# Patient Record
Sex: MALE | Race: WHITE
[De-identification: names, ages, dates, MRNs, and addresses within clinical notes are randomized per-mention and may not be internally consistent; named-entity substitution may affect disease eponyms.]

---

## 2017-01-13 ENCOUNTER — HOSPITAL ENCOUNTER (OUTPATIENT)
Dept: HOSPITAL 80 - FED | Age: 77
Setting detail: OBSERVATION
LOS: 1 days | Discharge: HOME | End: 2017-01-14
Attending: INTERNAL MEDICINE | Admitting: INTERNAL MEDICINE
Payer: COMMERCIAL

## 2017-01-13 DIAGNOSIS — I13.0: ICD-10-CM

## 2017-01-13 DIAGNOSIS — M10.9: ICD-10-CM

## 2017-01-13 DIAGNOSIS — N18.9: ICD-10-CM

## 2017-01-13 DIAGNOSIS — Z79.01: ICD-10-CM

## 2017-01-13 DIAGNOSIS — Z87.442: ICD-10-CM

## 2017-01-13 DIAGNOSIS — Z96.651: ICD-10-CM

## 2017-01-13 DIAGNOSIS — Z91.19: ICD-10-CM

## 2017-01-13 DIAGNOSIS — I50.23: Primary | ICD-10-CM

## 2017-01-13 DIAGNOSIS — I42.9: ICD-10-CM

## 2017-01-13 DIAGNOSIS — Z95.5: ICD-10-CM

## 2017-01-13 DIAGNOSIS — I48.2: ICD-10-CM

## 2017-01-13 DIAGNOSIS — I25.10: ICD-10-CM

## 2017-01-13 DIAGNOSIS — G31.84: ICD-10-CM

## 2017-01-13 DIAGNOSIS — R06.02: ICD-10-CM

## 2017-01-13 DIAGNOSIS — I50.22: ICD-10-CM

## 2017-01-13 DIAGNOSIS — R07.9: ICD-10-CM

## 2017-01-13 LAB
% IMMATURE GRANULYOCYTES: 0.2 % (ref 0–1.1)
ABSOLUTE IMMATURE GRANULOCYTES: 0.02 10^3/UL (ref 0–0.1)
ABSOLUTE NRBC COUNT: 0 10^3/UL (ref 0–0.01)
ADD DIFF?: NO
ADD MORPH?: NO
ADD SCAN?: NO
ANION GAP SERPL CALC-SCNC: 17 MEQ/L (ref 8–16)
APTT BLD: 35.1 SEC (ref 23–38)
ATYPICAL LYMPHOCYTE FLAG: 0 (ref 0–99)
CALCIUM SERPL-MCNC: 9.3 MG/DL (ref 8.5–10.4)
CHLORIDE SERPL-SCNC: 109 MEQ/L (ref 97–110)
CO2 SERPL-SCNC: 16 MEQ/L (ref 22–31)
CREAT SERPL-MCNC: 1.2 MG/DL (ref 0.7–1.3)
DIGOXIN SERPL-MCNC: < 0.4 NG/ML (ref 0.8–2)
ERYTHROCYTE [DISTWIDTH] IN BLOOD BY AUTOMATED COUNT: 13.9 % (ref 11.5–15.2)
FRAGMENT RBC FLAG: 0 (ref 0–99)
GFR SERPL CREATININE-BSD FRML MDRD: 59 ML/MIN/{1.73_M2}
GLUCOSE SERPL-MCNC: 170 MG/DL (ref 70–100)
HCT VFR BLD CALC: 46.8 % (ref 40–51)
HGB BLD-MCNC: 16.5 G/DL (ref 13.7–17.5)
INR PPP: 1.3 (ref 0.83–1.16)
LEFT SHIFT FLG: 0 (ref 0–99)
LIPEMIA HEMOLYSIS FLAG: 90 (ref 0–99)
MCH RBC BLDCO QN: 32.1 PG (ref 27.9–34.1)
MCHC RBC AUTO-ENTMCNC: 35.3 G/DL (ref 32.4–36.7)
MCV RBC AUTO: 91.1 FL (ref 81.5–99.8)
NRBC-AUTO%: 0 % (ref 0–0.2)
PLATELET # BLD: 207 10^3/UL (ref 150–400)
PLATELET CLUMPS FLAG: 10 (ref 0–99)
PMV BLD AUTO: 9.2 FL (ref 8.7–11.7)
POTASSIUM SERPL-SCNC: 4.2 MEQ/L (ref 3.5–5.2)
PROTHROMBIN TIME: 16.2 SEC (ref 12–15)
RBC # BLD AUTO: 5.14 10^6/UL (ref 4.4–6.38)
SODIUM SERPL-SCNC: 142 MEQ/L (ref 134–144)
TROPONIN I SERPL-MCNC: 0.08 NG/ML (ref 0–0.03)

## 2017-01-13 PROCEDURE — 93005 ELECTROCARDIOGRAM TRACING: CPT

## 2017-01-13 PROCEDURE — 99285 EMERGENCY DEPT VISIT HI MDM: CPT

## 2017-01-13 PROCEDURE — 93306 TTE W/DOPPLER COMPLETE: CPT

## 2017-01-13 PROCEDURE — 96374 THER/PROPH/DIAG INJ IV PUSH: CPT

## 2017-01-13 PROCEDURE — G0378 HOSPITAL OBSERVATION PER HR: HCPCS

## 2017-01-13 PROCEDURE — 71020: CPT

## 2017-01-13 RX ADMIN — ENOXAPARIN SODIUM SCH MG: 100 INJECTION SUBCUTANEOUS at 10:08

## 2017-01-13 RX ADMIN — SPIRONOLACTONE SCH MG: 25 TABLET, FILM COATED ORAL at 14:35

## 2017-01-13 RX ADMIN — CLOPIDOGREL BISULFATE SCH MG: 75 TABLET, FILM COATED ORAL at 14:34

## 2017-01-13 RX ADMIN — ISOSORBIDE MONONITRATE SCH MG: 30 TABLET, EXTENDED RELEASE ORAL at 14:34

## 2017-01-13 NOTE — EDPHY
H & P


Stated Complaint: cp ongoing x 4 hrs pta


Time Seen by Provider: 01/13/17 04:02


HPI/ROS: 


HPI


The patient presents with chest pain which began at 2:00 a.m. which awoke him 

from sleep tonight.  He is brought in by ambulance after receiving an aspirin 

in the field.  He describes it as a squeezing pain located in his epigastric 

region.  It lasted for about 2 hours until he arrived in the emergency 

department.  The pain was associated with a feeling of clamminess.  He has 

had several episodes of similar pain.  He has not had any associated shortness 

of breath, nausea, vomiting, diaphoresis.





He is followed by Dr. Massey and he says his last appointment was 1 week ago.  

He has been compliant with all of his medications and is keeping a journal.  He 

was admitted to the hospital in November for chest pain. He had a normal 

evaluation, please see Dr. gu takes discharge summary for further information.





REVIEW OF SYSTEMS


Constitutional:  No fever, no chills.


Eyes:  No discharge.


ENT:  No sore throat.


Cardiovascular:  +chest pain, no palpitations.


Respiratory:  No cough, no shortness of breath.


Gastrointestinal:  No abdominal pain, no vomiting.


Genitourinary:  No hematuria.


Musculoskeletal:  No back pain.


Skin:  No rashes.


Neurological:  No headache.





PMHx:  CAD with stent in place in the LAD, CHF





Soc Hx:  Lives alone











PHYSICAL


General Appearance: Alert, no distress


Eyes: Pupils equal and round no pallor or injection


ENT, Mouth: Mucous membranes moist


Respiratory: There are no retractions, lungs are clear to auscultation


Cardiovascular:  Tachycardic and irregular


Gastrointestinal:  Abdomen is soft and non-tender, no masses, bowel sounds 

normal 


Neurological:  A&O, moves all extremities


Skin:  Warm and dry, no rashes


Musculoskeletal: Neck is supple non tender 


Extremities:  symmetrical, full range of motion 


Psychiatric:  Patient is oriented X 3, there is no agitation 





Source: Patient, EMS


Exam Limitations: No limitations





- Personal History


Tetanus Vaccine Date: 3/2010





- Medical/Surgical History


Hx Asthma: No


Hx Chronic Respiratory Disease: No


Hx Diabetes: No


Hx Cardiac Disease: Yes


Hx Renal Disease: Yes


Hx Cirrhosis: No


Hx Alcoholism: No


Hx HIV/AIDS: No


Hx Splenectomy or Spleen Trauma: No


Other PMH: 1. Chronic systolic heart failure the chest Verito fraction of 25%.  

2. Coronary artery disease status post stenting on 11/16/2016.  3. Chronic 

atrial fibrillation.  4. Hypertension.  5. Chronic renal insufficiency.  6. 

Nephrolithiasis with ureteral stent placement November of 2016.  7. Gout.  8. 

Right total knee arthroplasty.  9. Appendectomy





- Social History


Smoking Status: Never smoked


Constitutional: 


 Initial Vital Signs











Temperature (C)  36.7 C   01/13/17 04:07


 


Heart Rate  117 H  01/13/17 04:07


 


Respiratory Rate  26 H  01/13/17 04:07


 


Blood Pressure  144/90 H  01/13/17 04:07


 


O2 Sat (%)  94   01/13/17 04:07








 











O2 Delivery Mode               Nasal Cannula


 


O2 (L/minute)                  2














Allergies/Adverse Reactions: 


 





atorvastatin calcium [From Lipitor] Allergy (Severe, Verified 01/13/17 04:13)


 Other-Enter Comments








Home Medications: 














 Medication  Instructions  Recorded


 


Allopurinol [Allopurinol 300  mg PO DAILY 11/23/16





(RX)]  


 


Aspirin EC [Aspirin EC 81 mg (*)] 81 mg PO DAILY 11/23/16


 


Clopidogrel Bisulfate [Plavix (*)] 75 mg PO DAILY 11/23/16


 


Digoxin [Lanoxin 125 mcg (RX)] 125 mcg PO HS 11/23/16


 


Furosemide [Lasix 40 MG (*)] 40 mg PO DAILY PRN 11/23/16


 


Metoprolol Succinate Xr [Toprol Xl 50 mg PO DAILY 11/23/16





50 mg (*)]  


 


Spironolactone [Aldactone 25 MG 12.5 mg PO DAILY 11/23/16





(*)]  


 


Acetaminophen [Tylenol 325mg (*)] 650 mg PO Q4HRS PRN #0 tab 11/25/16


 


Isosorbide Mononitrate [Imdur 30 30 mg PO DAILY #30 tab.sr 11/25/16





mg (*)]  


 


Warfarin Sodium [Coumadin 5MG (*)] 5 mg PO MOWE@16 #0 tab 11/25/16


 


Warfarin Sodium [Coumadin 7.5MG 7.5 mg PO SUTUTHFRSA@16 #0 tab 11/25/16





(*)]  














Medical Decision Making





- Diagnostics


EKG Interpretation: 


EKG EKG:  Complete interpretation has been separately recorded in the 

[employer] archive.  Summary impression: 


1. Demonstrates atrial fibrillation with RVR, 





EKG 2. Shows atrial fibrillation with rate in the 100s, no ST segment elevation


Imaging: 


Chest x-ray two views shows cardiomegaly, no infiltrate, interpreted by me, 

radiology interpretation is pending.


ED Course/Re-evaluation: 


5:00 a.m.-


The patient has had no recurrence of his chest pain.  Labs have been checked 

and do demonstrate elevated troponin, similar to priors.  His BNP is also 

elevated, higher than his usual.  On reassessment he says he feels well.  Plan 

to repeat troponin and will discuss the case with Cardiology.  The patient has 

had frequent admissions for chest pain.





7:00 a.m.-


The patient continues to be chest pain-free repeat troponin is slightly more 

elevated than the 1st.  He was given a dose of diltiazem with improvement in 

his heart rate from the 160s to the low 100s.


I discussed the case with the cardiologist Dr. Burr who recommends 

admission and is restarting the patient on his medications that he does not 

appear to be taking at home.  I have discussed the case with the hospitalist 

Dr. Cooley and we will admit the patient to the PCU.


Differential Diagnosis: 


This is a 76-year-old male with past medical history of CAD, ischemic 

cardiomyopathy, chronic kidney disease, possible cognitive impairment who 

presents brought in by ambulance for chest pain which began at 2:00 a.m. this 

morning while at rest and is now resolved.





On exam, he is in atrial fibrillation with RVR.  The remainder of his exam is 

unremarkable.





Differential diagnosis includes ACS, decompensated CHF, GERD.





- Data Points


Laboratory Results: 


 Laboratory Results





 01/13/17 04:03 





 01/13/17 04:03 





 











  01/13/17 01/13/17





  06:01 04:03


 


WBC    10.70 H 10^3/uL





    (3.80-9.50) 


 


RBC    5.14 10^6/uL





    (4.40-6.38) 


 


Hgb    16.5 g/dL





    (13.7-17.5) 


 


Hct    46.8 %





    (40.0-51.0) 


 


MCV    91.1 fL





    (81.5-99.8) 


 


MCH    32.1 pg





    (27.9-34.1) 


 


MCHC    35.3 g/dL





    (32.4-36.7) 


 


RDW    13.9 %





    (11.5-15.2) 


 


Plt Count    207 10^3/uL





    (150-400) 


 


MPV    9.2 fL





    (8.7-11.7) 


 


Neut % (Auto)    78.0 H %





    (39.3-74.2) 


 


Lymph % (Auto)    15.3 %





    (15.0-45.0) 


 


Mono % (Auto)    5.3 %





    (4.5-13.0) 


 


Eos % (Auto)    0.5 L %





    (0.6-7.6) 


 


Baso % (Auto)    0.7 %





    (0.3-1.7) 


 


Nucleat RBC Rel Count    0.0 %





    (0.0-0.2) 


 


Absolute Neuts (auto)    8.35 H 10^3/uL





    (1.70-6.50) 


 


Absolute Lymphs (auto)    1.64 10^3/uL





    (1.00-3.00) 


 


Absolute Monos (auto)    0.57 10^3/uL





    (0.30-0.80) 


 


Absolute Eos (auto)    0.05 10^3/uL





    (0.03-0.40) 


 


Absolute Basos (auto)    0.07 10^3/uL





    (0.02-0.10) 


 


Absolute Nucleated RBC    0.00 10^3/uL





    (0-0.01) 


 


Immature Gran %    0.2 %





    (0.0-1.1) 


 


Immature Gran #    0.02 10^3/uL





    (0.00-0.10) 


 


PT    16.2 H SEC





    (12.0-15.0) 


 


INR    1.30 H 





    (0.83-1.16) 


 


APTT    35.1 SEC





    (23.0-38.0) 


 


Sodium    142 mEq/L





    (134-144) 


 


Potassium    4.2 mEq/L





    (3.5-5.2) 


 


Chloride    109 mEq/L





    () 


 


Carbon Dioxide    16 L mEq/l





    (22-31) 


 


Anion Gap    17 mEq/L





    (8-16) 


 


BUN    18 mg/dL





    (7-23) 


 


Creatinine    1.2 mg/dL





    (0.7-1.3) 


 


Estimated GFR    59 





   


 


Glucose    170 H mg/dL





    () 


 


Calcium    9.3 mg/dL





    (8.5-10.4) 


 


Troponin I  0.079 H ng/mL  0.076 H ng/mL





   (0-0.034)   (0-0.034) 


 


NT-Pro-B Natriuret Pep    94016 H pg/mL





    (0-450) 


 


Digoxin    < 0.4 L ng/mL





    (0.8-2.0) 











Medications Given: 


 








Discontinued Medications





Diltiazem HCl (Cardizem 25 Mg/5 Ml Vial)  20 mg IVP EDNOW ONE


   Stop: 01/13/17 04:09


   Last Admin: 01/13/17 04:24 Dose:  20 mg








Departure





- Departure


Disposition: [facility] Inpatient Acute


Clinical Impression: 


 Acute exacerbation of CHF (congestive heart failure), Chest pain, Atrial 

fibrillation with rapid ventricular response


Condition: Fair

## 2017-01-13 NOTE — ECHO
[id number]

[id number]



+---------------------------------------------------+      [address]

:                                                   :       [place] [address]

:                                                   :           [phone]

+---------------------------------------------------+       Fax [phone]



                       Adult Echocardiographic Report



+----------------------------------------------------------------------------

-----+

:Name: HAL GARCIA CStudy Date: 2017 08:09 AM                       

     :

:MRN: [id number]       Hospital Admission Number: [id number] Locatio

n: ER:

:: 1940       Gender: Male                           Height: 852 in 

     :

:Age: 76 yrs           Race: WH,UN,U                          Weight: 189 lb 

     :

:Reason For Study: Eval LV Fx                                                

     :

:                                                             BSA: 12.5 meter

s2   :

:History: Known History of CHF, New onset A-Fib, SOB                         

     :

+----------------------------------------------------------------------------

-----+

MMode/2D Measurements & Calculations

IVSd: 1.4 cm       LVIDd: 8.5 cm    FS: 10.6 %           MV Diam: 4.7 cm

LVPWd: 1.3 cm      LVIDs: 7.6 cm    EDV(Teich): 391.2 ml

                                    ESV(Teich): 304.5 ml

                                    EF(Teich): 22.2 %

        _____________________________________________________________



Ao root diam:      LVOT diam: 2.1 cmLVLd ap4: 8.4 cm     SV(MOD-sp4):

3.9 cm             LVOT area:       EDV(MOD-sp4):        69.0 ml

ACS: 2.1 cm        3.5 cm2          206.0 ml

LA dimension:                       LVLs ap4: 8.3 cm

5.2 cm                              ESV(MOD-sp4):

                                    137.0 ml

                                    EF(MOD-sp4): 33.5 %



Normal Measurement Values:

+----------------------------------------------------------------------------

----------------------------------+

:LVIDd (3.5-5.7cm)    IVSd (0.6-1.1cm)     LVPWd (0.6-1.1cm)     Aortic Root 

(2.0-3.7cm)Left Atrium (1.5-4.0cm):

:LV Vol(d) (76-115ml) LV Vol(s) (29-48ml)  Ejec Fraction (50-65%)PV Vinny (0.6-

1.2m/s)    TV Vinny (0.4-1.0m/s)    :

:MV E Vinny (0.8-1.0m/s)MV A Vinny (0.3-1.0m/s)LVOT Vinny (0.7-1.2m/s) Asc Ao Vinny (

0.9-1.8m/s)                       :

+----------------------------------------------------------------------------

----------------------------------+

Doppler Measurements & Calculations

MV V2 max:          Ao mean PG:        AI max vinny:         LV V1 mean P.2 cm/sec        7.8 mmHg           439.4 cm/sec        2.0 mmHg

MV max P.3 mmHg Ao V2 mean:        AI max PG:          LV V1 mean:

MV V2 mean:         130.4 cm/sec       77.2 mmHg           62.6 cm/sec

71.5 cm/sec         Ao V2 VTI: 30.8 cm AI dec slope:       LV V1 VTI:

MV mean P.3 mmHgAVA(I,D): 2.0 cm2  208.6 cm/sec2       17.4 cm

MV V2 VTI: 20.1 cm                     AI P1/2t:

MV area (1 diam):                      616.9 msec

17.4 cm2



MVA(VTI): 3.0 cm2

MV Flow area(1diam):

17.4 cm2

        _____________________________________________________________



MR max vinny:         MR(RF 1 diam):     SV(MV 1 diam):      PA V2 max:

482.8 cm/sec        10.2 %             349.7 ml            100.4 cm/sec

MR max P.5 mmHg                   SI(MV 1 diam):      PA max P.0 ml/m2          4.0 mmHg

                                       SV(LVOT): 60.3 ml



        _____________________________________________________________

TR max vinny:         RF(MV,Ao)(1 diam):

308.1 cm/sec        -0.06

TR max P.0 mmHgRF(MV,LVOT)

RAP systole:        (1diam): 0.83

5.0 mmHg

RVSP(TR): 43.0 mmHg



Left Ventricle

The left ventricle is severely dilated. There is mild to moderate concentric

left ventricular hypertrophy. Ejection Fraction = 15-20%. The rhythm is

atrial fibrillation. There is severe global hypokinesis of the left

ventricle.







Right Ventricle

The right ventricle is normal size. The right ventricular systolic function

is mildly reduced.



Atria

The left atrium is moderate to severely dilated. The Left Atrial Volume is

48 ml/m2. The right atrium is mildly dilated.



Mitral Valve

There is mild to moderate mitral annular calcification. There is no evidence

of mitral valve prolapse. There is no mitral valve stenosis. There is

moderate to severe mitral regurgitation.



Tricuspid Valve

There is mild tricuspid regurgitation.



Aortic Valve

The aortic valve is trileaflet. The aortic valve opens well. There is no

aortic stenosis. Moderate aortic regurgitation.



Pulmonic Valve

The pulmonic valve is normal in structure and function. There is no pulmonic

valvular regurgitation.





Great Vessels

The aortic root is normal size.



Pericardium/Pleural

There is no pericardial effusion.



Conclusion

A complete two-dimensional transthoracic echocardiogram was performed (2D,

M-mode, Doppler and color flow Doppler).

Compared to previous the rhythm is atrial fibrillation and the EF appears to

have decreased to 20% range from 30%.

The left ventricle is severely dilated.

There is mild to moderate concentric left ventricular hypertrophy.

Ejection Fraction = 15-20%.

The rhythm is atrial fibrillation.

The right ventricular systolic function is mildly reduced.

The left atrium is moderate to severely dilated.

There is mild to moderate mitral annular calcification.

There is moderate to severe mitral regurgitation.

There is mild tricuspid regurgitation.

The aortic valve is trileaflet.

The aortic valve opens well.

Moderate aortic regurgitation.

The aortic root is normal size.

Compared to previous the rhythm is atrial fibrillation and the EF appears to

have decreased to 20% range from 30%

There is severe global hypokinesis of the left ventricle.





_____________________________________________________________________________





Final Reading Physician:

                        Rehana Lorenzo signed on 2017 10:13 AM

Ordering Physician: Shiloh Cooley

Performed By: Chase Castro, CS

## 2017-01-13 NOTE — PDCARPN
Cardiology Progress Note


Assessment/Plan: 


76-year-old male well-known to our practice. Has a long-standing history of 

systolic CHF related to cardiomyopathy with severely reduced left ventricular 

systolic function out of proportion to his coronary artery disease. Has had 

multiple catheterizations over the years demonstrating mild to moderate 

irregularities. Ultimately, underwent PCI of the mid-LAD 2 months ago. LVEF is 

chronically in the range of 20%. He also has chronic atrial fibrillation. Has a 

history of issues with medical noncompliance in part due to cognitive issues. 

This has improved recently with a systematic plan including a medication 

journal. Presented to the emergency room early this morning after he woke at 

approximately 2 AM with significant shortness of breath and chest discomfort. 

Two troponin levels have been minimally elevated with a "flat curve" consistent 

with myocardial oxygen supply/demand mismatch. Received intravenous Lasix in 

the emergency room. He is not experiencing chest discomfort at the time of my 

evaluation. Does not demonstrate evidence of significant volume overload. His 

chest x-ray is clear. Perhaps trace rales at the lung bases. No peripheral 

edema. Currently borderline with respect to rate control of his atrial 

fibrillation. Interestingly, has not been on a standing dose of a loop diuretic 

for the past few weeks since he had not manifested significant volume 

retention. He will be going to PCU. Would consider one or 2 additional doses of 

intravenous Lasix since his BNP is significantly higher than usual.





Will be seen by Dr. Massey over the weekend.





Agree with additional metoprolol via IV ass needed to help with rate control.





Continue the other components of his CHF med regimen.





He has declined ICD implantation.





Has been declined by [facility] for destination LVAD.














01/13/17 15:42





Subjective: 


Shortness of breath and chest discomfort.


Objective: 





 Vital Signs (8 Hrs)











  Pulse Resp BP Pulse Ox


 


 01/13/17 10:00  110 H  21 H  103/71  94











Result Diagrams: 


 01/13/17 04:03





 01/13/17 04:03


Cardiac Labs: 





 Cardiac Lab Results (72 Hrs)











  01/13/17





  12:10


 


Troponin I  0.094 H














- Physical Exam


Constitutional: WDWN, no apparent distress


Eyes: anicteric sclera


Ears, Nose, Mouth, Throat: moist mucous membranes


Cardiovascular: irregularly irregular, No jugular vein distention


Respiratory: other (trace rales at bases)


Gastrointestinal: normoactive bowel sounds, no tenderness, no masses


Skin: no rashes, no edema


Neurologic: AAOx3


Psychiatric: interactive, not anxious





ICD10 Worksheet


Patient Problems: 


 Problems











Problem Status Diagnosed


 


Acute exacerbation of CHF (congestive heart failure) Acute 


 


Atrial fibrillation Acute 


 


Atrial fibrillation with rapid ventricular response Acute 


 


Cardiomyopathy Acute 


 


Chest pain Acute 


 


Chest pain at rest Acute 


 


Congestive heart failure Acute 


 


Constipation Acute 


 


Nephrolithiasis Acute 


 


Ureterolithiasis Acute 


 


Chest pain in adult Acute 


 


Confusion with nonfocal neurological examination Acute 


 


Near syncope Acute

## 2017-01-13 NOTE — CPEKG
Heart Rate: 107

RR Interval: 561

QRSD Interval: 162

QT Interval: 400

QTC Interval: 534

QRS Axis: 2

T Wave Axis: 89

EKG Severity - ABNORMAL ECG -

EKG Impression: ATRIAL FIBRILLATION, V-RATE 

EKG Impression: PAIRED VENTRICULAR PREMATURE COMPLEXES

EKG Impression: RIGHT BUNDLE BRANCH BLOCK

EKG Impression: ST DEPRESSION, CONSIDER ISCHEMIA, ANT-LAT LDS

Electronically Signed By: Jimbo White 17-Jan-2017 13:43:14

## 2017-01-13 NOTE — DX
PA and Lateral Chest   January 13, 2017

 

Clinical Indications:  Chest pain.

 

Comparison:  November 23, 2016.

 

Findings:  The lungs are clear, and no masses are found.  The heart is enlarged and pulmonary vessels
 are normal.  There are no pleural effusions and no pneumothorax.  The bones are unremarkable for thi
s age.

 

Impression:  Stable cardiomegaly without failure.

## 2017-01-13 NOTE — GHP
[f rep st]



                                                            HISTORY AND PHYSICAL





DATE OF ADMISSION:  01/13/2017



CHIEF COMPLAINT:  Shortness of breath.



HISTORY:  Gucci Elliott is a 76-year-old male, with systolic congestive heart failure, ejection fract
ion 25%, who has recurrent admissions for CHF exacerbation.  Despite weekly visits with Dr. Massey we 
have been unable to keep him out of the hospital for any sustained period of time.  There has been co
ncern about his cognition and inability to take his medications properly.  He now re-presents to the 
hospital with acute onset of shortness of breath which woke him from sleep at 2 o'clock in the mornin
g.  He did have some associated chest tightness.  He adamantly denies any medication noncompliance; h
miguel aever, on presentation his digoxin level is undetectable, and his INR is only 1.3, despite him repor
tedly on Coumadin.  He does admit that sometimes he falls asleep in the evening and misses his evenin
g medication, but otherwise has a journal and pill boxes and tries to be very consistent with his med
ications.  He also felt confused and foggy when he woke up in the middle of the night.  He now feels 
back to baseline and he is hoping to go home.  He does complain that his daily fatigue it is increasi
ng and he gets exhausted just going to the grocery store for 30 minutes.  He denies any lower extremi
ty edema and his weight has been very stable.  He weighs himself twice a day.  He also recently has h
ad viral upper respiratory tract infections and is concerned he may have developed the flu.



PAST MEDICAL HISTORY:  Systolic congestive heart failure.  Ejection fraction 25%.  Coronary artery anny rider, status post stent to the LAD, November 2016. Chronic kidney disease.  Atrial fibrillation.  Amari schafer stone status post stent.  Dementia.



PAST SURGERIES:  Appendectomy.



MEDICATIONS:  Please see computer record for full detailed list.



ALLERGIES:  Atorvastatin.



SOCIAL HISTORY:  No smoking.  No alcohol.  He lives alone.



REVIEW OF SYSTEMS:  Complete review of systems obtained.  Review of systems negative for constitution
al, HEENT, GI, pulmonary, cardiovascular, , hematology, skin, musculoskeletal,  endocrine, psych, e
xcept for positives and negatives as in HPI.



FAMILY HISTORY:  Reviewed, noncontributory to presenting complaint.



PHYSICAL EXAMINATION:  GENERAL:  Well-developed, well-nourished male, in no acute distress.  VITAL SI
GNS: Temp 36.7, pulse 117, blood pressure 123/86, saturating 94% on room air.  HEENT: Eye examination
 normal conjunctiva.  Pupils equal and react to light.  ENT, normal ears and nose.  Hearing intact.  
Normal teeth.  Oropharynx moist.  NECK:  Trachea midline.  No thyromegaly.  CHEST: Normal.  LUNGS:  C
lear to auscultation bilaterally.  CARDIOVASCULAR: Irregularly irregular.  No murmur.  No lower extre
mity edema.  ABDOMEN:  Soft, nontender.  No hepatosplenomegaly.  SKIN:  Warm, dry, intact.  No rash. 
 MUSCULOSKELETAL:  No cyanosis or clubbing.  Strength 5/5 upper and lower extremities.  NEURO:  Crani
al nerves intact.  Normal sensation to light touch.  PSYCHOSOCIAL: Alert and oriented x3.  Normal moo
d and affect.  Normal judgment and insight.  Normal memory 

On gross testing.



LABS:  White count 10.7, hematocrit 46.8, platelets 207.  Sodium 142, potassium 4.2, chloride 109, bi
carb 16, BUN 18, creatinine 1.2.  Glucose 170.  Troponin initially 0.076.  BNP is 16,200.  INR is 1.3
.  Digoxin level is undetectable. 



EKG reviewed by me.  My personal interpretation is atrial fibrillation with right bundle branch block
.  



Chest x-ray shows possible mild pulmonary edema.



ASSESSMENT/PLAN:  

1.  Shortness of breath.  Acute onset at 2 a.m.  Differential diagnosis is congestive heart failure e
xacerbation versus infection versus ischemia versus pulmonary embolus versus obstructive sleep apnea.
  We will give a 1 time dose of IV Lasix.  Will check flu swab.  Will follow serial troponins.  We wi
ll check a D-dimer as he is on warfarin, but likely noncompliant given his INR on presentation of 1.3
.  These recurrent episodes also seem to happen in the middle of the night.  Perhaps he is having nig
httime hypoxemia.  Would consider outpatient nocturnal oximetry.

2.  Acute on chronic systolic congestive heart failure.  Ejection fraction 25%.  Medication complianc
e has been the main issue in the past.  Patient seems motivated and denies noncompliance although con
cern for dementia and cognitive issues have been raised in the past.  Will order a cognitive evaluati
on and speech therapy. 

3.  Coronary artery disease, status post recent stent on November 16.  Lack of Plavix compliance woul
d be very concerning in this situation.  Will follow troponins. 

4.  Chronic kidney disease, is currently at his baseline.

5.  Atrial fibrillation, rapid ventricular response.  Suspect this is also due to poor medication com
pliance.  We will resume metoprolol. 

6.  Suspected dementia.  Cognitive evaluation with speech therapy as discussed above.



CODE STATUS:  Full.



ADMISSION STATUS:  Will admit to observation as hopefully can discharge after 1 midnight.



DVT PROPHYLAXIS:  He is high risk due to his Coumadin noncompliance.  Will give him subcu Lovenox.





Job #:  [id number]

## 2017-01-14 VITALS — TEMPERATURE: 97.6 F | OXYGEN SATURATION: 95 %

## 2017-01-14 VITALS — SYSTOLIC BLOOD PRESSURE: 110 MMHG | DIASTOLIC BLOOD PRESSURE: 79 MMHG

## 2017-01-14 VITALS — RESPIRATION RATE: 20 BRPM | HEART RATE: 105 BPM

## 2017-01-14 RX ADMIN — ENOXAPARIN SODIUM SCH: 100 INJECTION SUBCUTANEOUS at 09:16

## 2017-01-14 RX ADMIN — CLOPIDOGREL BISULFATE SCH MG: 75 TABLET, FILM COATED ORAL at 09:13

## 2017-01-14 RX ADMIN — ISOSORBIDE MONONITRATE SCH MG: 30 TABLET, EXTENDED RELEASE ORAL at 09:13

## 2017-01-14 RX ADMIN — SPIRONOLACTONE SCH MG: 25 TABLET, FILM COATED ORAL at 09:13

## 2017-01-14 NOTE — SOAPPROG
SOAP Progress Note


Assessment/Plan: 


Assessment:





77 y/o man with CAD s/p several PCI, permanent afib, and chronic ischemic 

systolic CHF LVEF 27% having refused AICDs in past with brief decompensation of 

his CHF with pulmomary edema doing better now after IV lasix. Appears 

compensated and wants to go home. I do not think he is having unstable angina. 





REC:


1)start Lasix 40mg PO qam daily,,,,, not prn


2)restart Losartan 25mg PO qHS.


3)increase Toprol XL to 75mg PO qam


4)rest of meds without changes.


5)okay to discharge home from CHF standpoint today. Was scheduled to see me in 

[facility] in ten days but will move his appt up to this Monday afternoon Jan 16th to make sure doing stable to better.


6)Thanks.














01/14/17 09:28





Subjective: 


overall feels better and back to his baseline. Denies CP, PND, near syncope, 

palpitations or cough. Wants to go home.


Objective: 





 Vital Signs











Temp Pulse Resp BP Pulse Ox


 


 36.4 C   105 H  20   110/79   95 


 


 01/14/17 07:48  01/14/17 08:18  01/14/17 08:18  01/14/17 07:48  01/14/17 07:48








 











 01/13/17 01/14/17 01/15/17





 05:59 05:59 05:59


 


Intake Total  970 240


 


Output Total  175 


 


Balance  795 240








 











PT  16.2 SEC (12.0-15.0)  H  01/13/17  04:03    


 


INR  1.30  (0.83-1.16)  H  01/13/17  04:03    














Physical Exam





- Physical Exam


General Appearance: alert


EENT: PERRL/EOMI


Neck: non-tender


Respiratory: lungs clear


Cardiac/Chest: gallop, JVD (jvp to 7-8cm), systolic murmur, irregularly 

irregular


Peripheral Pulses: 2+: carotid (R), carotid (L), femoral (R), femoral (L), 

dorsalis-pedis (R), dorsalis-pedis (L)


Abdomen: normal bowel sounds, non-tender, soft, No ascites


Skin: warm/dry


Extremities: non-tender, No pedal edema


Neuro/Psych: alert





ICD10 Worksheet


Patient Problems: 


 Problems











Problem Status Diagnosed


 


Acute exacerbation of CHF (congestive heart failure) Acute 


 


Atrial fibrillation Acute 


 


Atrial fibrillation with rapid ventricular response Acute 


 


Cardiomyopathy Acute 


 


Chest pain Acute 


 


Chest pain at rest Acute 


 


Congestive heart failure Acute 


 


Constipation Acute 


 


Nephrolithiasis Acute 


 


Ureterolithiasis Acute 


 


Chest pain in adult Acute 


 


Confusion with nonfocal neurological examination Acute 


 


Near syncope Acute

## 2017-01-14 NOTE — GDS
[f rep st]



                                                             DISCHARGE SUMMARY





DISCHARGE DIAGNOSES:  

1.  Acute on chronic systolic congestive heart failure.

2.  Chronic ischemic systolic heart failure with left ventricular ejection fraction of 27%, refusing 
AICD.

3.  Coronary artery disease.

4.  Chronic kidney disease.

5.  Atrial fibrillation.



CONSULTANTS:  Dr. Tip Massey, [facility].



HOSPITAL COURSE AND STAY BY PROBLEM:  Acute on chronic systolic heart failure:  The patient was place
d on observation, where he received 2 doses of IV Lasix.  With diuresis, the patient's symptoms seeme
d to be better.  He was seen by Cardiology, who did not think his symptoms were consistent with acute
 coronary syndrome. 



On the day of discharge, the patient states he feels well and would like to go home.  Once again, he 
has been seen by Cardiology, who thought discharge was appropriate.



PHYSICAL EXAM:  VITAL SIGNS:  On the day of discharge, blood pressure 110/79, pulse 105, respiratory 
rate 20, O2 sat 95% on 2 L, temperature afebrile.  GENERAL:  No acute distress.  HEART:  S1, S2.  Tac
hycardic.  LUNGS:  Clear.  No wheezes or rales.  ABDOMEN:  Soft.  EXTREMITIES:  No edema.



PROCEDURES DONE THIS HOSPITAL STAY:  Echocardiogram done 01/13/2017.  Refer to report.



DISCHARGE MEDICATIONS:  Please refer to discharge medication reconciliation in [facility] for full deta
ils.  Below is a preliminary list. 



Home medications that have been changed:  Lasix was changed to 40 mg daily instead of p.r.n.  Metopro
lol was increased to 75 mg daily.  Losartan was ordered at 25 mg daily. 



All other home medications were continued at the same dosages.



DISCHARGE INSTRUCTIONS:  The patient will be discharged from the hospital, where he is to follow up a
t the [facility] next week as scheduled.





Job #:  [id number]

## 2017-01-19 ENCOUNTER — HOSPITAL ENCOUNTER (INPATIENT)
Dept: HOSPITAL 80 - FED | Age: 77
LOS: 3 days | Discharge: HOME | DRG: 292 | End: 2017-01-22
Attending: INTERNAL MEDICINE | Admitting: INTERNAL MEDICINE
Payer: COMMERCIAL

## 2017-01-19 DIAGNOSIS — I50.21: Primary | ICD-10-CM

## 2017-01-19 DIAGNOSIS — I25.10: ICD-10-CM

## 2017-01-19 DIAGNOSIS — Z53.09: ICD-10-CM

## 2017-01-19 DIAGNOSIS — I11.0: ICD-10-CM

## 2017-01-19 DIAGNOSIS — I51.3: ICD-10-CM

## 2017-01-19 DIAGNOSIS — T45.516A: ICD-10-CM

## 2017-01-19 DIAGNOSIS — I95.9: ICD-10-CM

## 2017-01-19 DIAGNOSIS — N17.9: ICD-10-CM

## 2017-01-19 DIAGNOSIS — Z96.651: ICD-10-CM

## 2017-01-19 DIAGNOSIS — I48.2: ICD-10-CM

## 2017-01-19 LAB
% IMMATURE GRANULYOCYTES: 0.3 % (ref 0–1.1)
% IMMATURE GRANULYOCYTES: 0.3 % (ref 0–1.1)
ABSOLUTE IMMATURE GRANULOCYTES: 0.02 10^3/UL (ref 0–0.1)
ABSOLUTE IMMATURE GRANULOCYTES: 0.03 10^3/UL (ref 0–0.1)
ABSOLUTE NRBC COUNT: 0 10^3/UL (ref 0–0.01)
ABSOLUTE NRBC COUNT: 0 10^3/UL (ref 0–0.01)
ADD DIFF?: NO
ADD DIFF?: NO
ADD MORPH?: NO
ADD MORPH?: NO
ADD SCAN?: NO
ADD SCAN?: NO
ALBUMIN SERPL-MCNC: 4.3 G/DL (ref 3.5–5)
ALP SERPL-CCNC: 114 IU/L (ref 38–126)
ALT SERPL-CCNC: 27 IU/L (ref 21–72)
ANION GAP SERPL CALC-SCNC: 10 MEQ/L (ref 8–16)
ANION GAP SERPL CALC-SCNC: 15 MEQ/L (ref 8–16)
APTT BLD: 36.5 SEC (ref 23–38)
APTT BLD: 46.2 SEC (ref 23–38)
AST SERPL-CCNC: 26 IU/L (ref 17–59)
ATYPICAL LYMPHOCYTE FLAG: 0 (ref 0–99)
ATYPICAL LYMPHOCYTE FLAG: 0 (ref 0–99)
BILIRUB SERPL-MCNC: 1.4 MG/DL (ref 0.1–1.4)
BILIRUBIN-CONJUGATED: 0.3 MG/DL (ref 0–0.5)
BILIRUBIN-UNCONJUGATED: 1.1 MG/DL (ref 0–1.1)
CALCIUM SERPL-MCNC: 9 MG/DL (ref 8.5–10.4)
CALCIUM SERPL-MCNC: 9.6 MG/DL (ref 8.5–10.4)
CHLORIDE SERPL-SCNC: 110 MEQ/L (ref 97–110)
CHLORIDE SERPL-SCNC: 112 MEQ/L (ref 97–110)
CO2 SERPL-SCNC: 19 MEQ/L (ref 22–31)
CO2 SERPL-SCNC: 21 MEQ/L (ref 22–31)
CREAT SERPL-MCNC: 1.2 MG/DL (ref 0.7–1.3)
CREAT SERPL-MCNC: 1.2 MG/DL (ref 0.7–1.3)
CREATINE KINASE-MB FRACTION: 1.36 NG/ML (ref 0–3.19)
ERYTHROCYTE [DISTWIDTH] IN BLOOD BY AUTOMATED COUNT: 14.3 % (ref 11.5–15.2)
ERYTHROCYTE [DISTWIDTH] IN BLOOD BY AUTOMATED COUNT: 14.4 % (ref 11.5–15.2)
FRAGMENT RBC FLAG: 0 (ref 0–99)
FRAGMENT RBC FLAG: 0 (ref 0–99)
GFR SERPL CREATININE-BSD FRML MDRD: 59 ML/MIN/{1.73_M2}
GFR SERPL CREATININE-BSD FRML MDRD: 59 ML/MIN/{1.73_M2}
GLUCOSE SERPL-MCNC: 103 MG/DL (ref 70–100)
GLUCOSE SERPL-MCNC: 119 MG/DL (ref 70–100)
HCT VFR BLD CALC: 43.4 % (ref 40–51)
HCT VFR BLD CALC: 47.2 % (ref 40–51)
HGB BLD-MCNC: 14.9 G/DL (ref 13.7–17.5)
HGB BLD-MCNC: 15.9 G/DL (ref 13.7–17.5)
INR PPP: 1.37 (ref 0.83–1.16)
INR PPP: 1.41 (ref 0.83–1.16)
LEFT SHIFT FLG: 0 (ref 0–99)
LEFT SHIFT FLG: 0 (ref 0–99)
LIPEMIA HEMOLYSIS FLAG: 80 (ref 0–99)
LIPEMIA HEMOLYSIS FLAG: 90 (ref 0–99)
MAGNESIUM SERPL-MCNC: 1.9 MG/DL (ref 1.6–2.3)
MAGNESIUM SERPL-MCNC: 2 MG/DL (ref 1.6–2.3)
MCH RBC BLDCO QN: 32.1 PG (ref 27.9–34.1)
MCH RBC BLDCO QN: 32.2 PG (ref 27.9–34.1)
MCHC RBC AUTO-ENTMCNC: 33.7 G/DL (ref 32.4–36.7)
MCHC RBC AUTO-ENTMCNC: 34.3 G/DL (ref 32.4–36.7)
MCV RBC AUTO: 93.7 FL (ref 81.5–99.8)
MCV RBC AUTO: 95.4 FL (ref 81.5–99.8)
NRBC-AUTO%: 0 % (ref 0–0.2)
NRBC-AUTO%: 0 % (ref 0–0.2)
PLATELET # BLD: 183 10^3/UL (ref 150–400)
PLATELET # BLD: 218 10^3/UL (ref 150–400)
PLATELET CLUMPS FLAG: 0 (ref 0–99)
PLATELET CLUMPS FLAG: 0 (ref 0–99)
PMV BLD AUTO: 10 FL (ref 8.7–11.7)
PMV BLD AUTO: 9.5 FL (ref 8.7–11.7)
POTASSIUM SERPL-SCNC: 4.5 MEQ/L (ref 3.5–5.2)
POTASSIUM SERPL-SCNC: 5.1 MEQ/L (ref 3.5–5.2)
PROT SERPL-MCNC: 7.7 G/DL (ref 6.3–8.2)
PROTHROMBIN TIME: 16.9 SEC (ref 12–15)
PROTHROMBIN TIME: 17.2 SEC (ref 12–15)
RBC # BLD AUTO: 4.63 10^6/UL (ref 4.4–6.38)
RBC # BLD AUTO: 4.95 10^6/UL (ref 4.4–6.38)
SODIUM SERPL-SCNC: 143 MEQ/L (ref 134–144)
SODIUM SERPL-SCNC: 144 MEQ/L (ref 134–144)
SPECIMEN HEMOLYSIS: 127
TROPONIN I SERPL-MCNC: 0.1 NG/ML (ref 0–0.03)

## 2017-01-19 RX ADMIN — DIGOXIN SCH MCG: 125 TABLET ORAL at 20:09

## 2017-01-19 RX ADMIN — RIVAROXABAN SCH MG: 10 TABLET, FILM COATED ORAL at 18:18

## 2017-01-19 NOTE — EDPHY
[id number] male he has significant past medical history for congestive 

heart failure EF of 27%, coronary artery disease, chronic kidney disease, AFib, 

presents emergency room by EMS for shortness of breath and pressure type pain 

in his chest.  He states he woke up around 2:00 a.m. feeling short of breath 

having discomfort pressure like in his chest.  He called 911 and EMS brought 

him to the emergency room they gave  him full-dose aspirin and his chest pain 

resolved.  He tells me that he is due to see Dr. White today for his AFib and 

pacemaker.   





Past Medical History: Coronary artery disease with stents, CHF, ischemic 

cardiomyopathy, last known EF 27%, AFib





Past Surgical History:  Coronary artery disease with stents





Social History:  Denies use of drugs alcohol tobacco products





Family History:  Noncontributory








ROS   


REVIEW OF SYSTEMS:


A comprehensive 10 point review of systems is otherwise negative aside from 

elements mentioned in the history of present illness.








Exam   


Constitutional   triage nursing summary reviewed, vital signs reviewed, awake/

alert. 


Eyes   normal conjunctivae and sclera, EOMI, PERRLA. 


HENT   normal inspection, atraumatic, moist mucus membranes, no epistaxis, neck 

supple/ no meningismus, no raccoon eyes. 


Respiratory   clear to auscultation bilaterally, normal breath sounds, no 

respiratory distress, no wheezing. 


Cardiovascular   rate normal, regular rhythm, no murmur, no edema, distal 

pulses normal. 


Gastrointestinal   soft, non-tender, no rebound, no guarding, normal bowel 

sounds, no distension, no pulsatile mass. 


Genitourinary   no CVA tenderness. 


Musculoskeletal  no midline vertebral tenderness, full range of motion, no calf 

swelling, no tenderness of extremities, no meningismus, good pulses, 

neurovascularly intact.


Skin   pink, warm, & dry, no rash, skin atraumatic. 


Neurologic   awake, alert and oriented x 3, AAOx3, moves all 4 extremities 

equally, motor intact, sensory intact, CN II-XII intact, normal cerebellar, 

normal vision, normal speech. 


Psychiatric   normal mood/affect. 


Heme/Lymph/Immune   no lymphadenopathy.





Differential diagnosis includes but is not limited to:  ACS, atypical chest pain

, pneumothorax, pneumonia, pulmonary embolism, aortic dissection, congestive 

heart failure, tumor, musculoskeletal pain, esophageal pain, GERD, peptic ulcer 

disease, pancreatitis





Medical Decision Making: this patient had an IV established will obtain blood 

work patient will have an EKG, chest x-ray troponin he is chest pain-free at 

this time.  Receive full-dose aspirin prior to arrival.  Most likely patient 

will need to be admitted given he was here with recent stent placement now has 

chest discomfort.





Re-evaluation:








EKG interpretation by me on record in [employer] system.  Impression time of 

EKG 5:21 a.m., this is AFib rate of 114, PVC present right bundle-branch block 

present.  This is similar in appearance to his previous EKG dated 1/13/2017 I 

do not appreciate any acute focal ischemic changes specifically ST elevation.





0628:  Re-evaluation at this time I have ordered this patient IV Lasix 40 mg 

patient is requiring 3 L nasal cannula.  I have ordered this patient's stat 

echocardiogram.  


0628:   I spoke with her Dr. Wray, cardiologist on call who will be glad to 

see this patient and evaluate him inpatient.


  Is noted I did review this patient's recent hospitalization for decompensated 

heart failure.





ED x-ray chest one view:  Shows significant cardiomegaly increased in size from 

his previous x-ray 5 days ago he has pulmonary edema present there is no 

pleural effusions.  Image interpreted by me.  Echo is pending.





0629:  at this time this patient is hemodynamically stable no acute distress he 

is requiring 3 L nasal cannula supplemental oxygen he is not requiring BiPAP he 

is not overtly hypertensive he is resting comfortably and is safe for admission 

to the PCU.  It is also noted he has a positive troponin most likely due to 

decompensated heart failure.


Source: Patient, EMS





- Personal History


Tetanus Vaccine Date: 3/2010





- Medical/Surgical History


Hx Asthma: No


Hx Chronic Respiratory Disease: No


Hx Diabetes: No


Hx Cardiac Disease: Yes


Hx Renal Disease: Yes


Hx Cirrhosis: No


Hx Alcoholism: No


Hx HIV/AIDS: No


Hx Splenectomy or Spleen Trauma: No


Other PMH: 1. Chronic systolic heart failure the chest Verito fraction of 25%.  

2. Coronary artery disease status post stenting on 11/16/2016.  3. Chronic 

atrial fibrillation.  4. Hypertension.  5. Chronic renal insufficiency.  6. 

Nephrolithiasis with ureteral stent placement November of 2016.  7. Gout.  8. 

Right total knee arthroplasty.  9. Appendectomy





- Social History


Smoking Status: Never smoked


Constitutional: 


 Initial Vital Signs











O2 Sat (%)  96   01/19/17 05:15








 











O2 Delivery Mode               Nasal Cannula


 


O2 (L/minute)                  2














Allergies/Adverse Reactions: 


 





atorvastatin calcium [From Lipitor] Allergy (Severe, Verified 01/13/17 04:13)


 Other-Enter Comments








Home Medications: 














 Medication  Instructions  Recorded


 


Allopurinol [Allopurinol 300  mg PO DAILY 11/23/16





(RX)]  


 


Aspirin EC [Aspirin EC 81 mg (*)] 81 mg PO DAILY 11/23/16


 


Clopidogrel Bisulfate [Plavix (*)] 75 mg PO DAILY 11/23/16


 


Digoxin [Lanoxin 125 mcg (RX)] 125 mcg PO HS 11/23/16


 


Furosemide [Lasix 40 MG (*)] 60 mg PO DAILY 11/23/16


 


Spironolactone [Aldactone 25 MG 12.5 mg PO DAILY 11/23/16





(*)]  


 


Acetaminophen [Tylenol 325mg (*)] 325 mg PO Q4HRS PRN 01/19/17


 


Losartan Potassium [Cozaar 25 mg 12.5 mg PO DAILY 01/19/17





(*)]  


 


Metoprolol Succinate Xr [Toprol Xl 100 mg PO DAILY 01/19/17





100 mg (*)]  


 


Warfarin Sodium [Coumadin 5MG (*)] 5 mg PO DAILY 01/19/17














Medical Decision Making





- Data Points


Laboratory Results: 


 Laboratory Results





 01/19/17 05:18 





 01/19/17 05:18 








Medications Given: 


 








Discontinued Medications





Bacitracin (Bacitracin 1000 Ml Irrigation)  50,000 units IRR ONCALL ONE


   Stop: 01/19/17 09:38


   Last Admin: 01/19/17 10:11 Dose:  Not Given


Diazepam (Valium)  5 mg PO ONCALL ONE


   Stop: 01/19/17 09:38


   Last Admin: 01/19/17 10:11 Dose:  Not Given


Diphenhydramine HCl (Benadryl)  25 mg PO ONCALL ONE


   Stop: 01/19/17 09:38


   Last Admin: 01/19/17 10:11 Dose:  Not Given


Furosemide (Lasix Injection)  40 mg IVP EDNOW ONE


   Stop: 01/19/17 06:19


   Last Admin: 01/19/17 06:38 Dose:  40 mg


Sodium Chloride (Ns)  500 mls @ 0 mls/hr IV ONCE ONE


   PRN Reason: As Directed


   Stop: 01/19/17 05:23


   Last Admin: 01/19/17 06:43 Dose:  Not Given


Sodium Chloride (Ns)  1,000 mls @ 0 mls/hr IV ONCALL ONE


   PRN Reason: TKO


   Stop: 01/19/17 09:38


   Last Admin: 01/19/17 10:11 Dose:  Not Given


Sodium Chloride (Ns)  1,000 mls @ 0 mls/hr IV ONCALL ONE


   PRN Reason: TKO


   Stop: 01/19/17 09:41


   Last Admin: 01/19/17 10:11 Dose:  Not Given


Cefazolin Sodium/Dextrose (Ancef 2 Gm (Premix))  100 mls @ 200 mls/hr IV ONCALL 

ONE


   Stop: 01/19/17 10:29


   Last Admin: 01/19/17 10:11 Dose:  Not Given


Metoprolol Succinate (Toprol Xl)  100 mg PO DAILY SALONI


   Stop: 07/19/17 08:59


   Last Admin: 01/20/17 08:40 Dose:  100 mg


Metoprolol Succinate (Toprol Xl)  50 mg PO ONCE ONE


   Stop: 01/20/17 12:31


   Last Admin: 01/20/17 12:27 Dose:  50 mg








Departure





- Departure


Disposition: [facility]


Clinical Impression: 


 Acute decompensated heart failure


Condition: Fair

## 2017-01-19 NOTE — PDCARPN
Cardiology Progress Note


Chief Complaint: 


sCHF


Assessment/Plan: 


Assessment:


75 y/o M with ischemic CM with most recent measured EF 17%, CAD s/p PCI LAD in 

11/16, permanent AF, frequent CHF hospitalizations, here for CHF exacerbation. 

He was admitted this AM for worsening shortness of breath at rest. 





#. sCHF: NYHA FC III-IV symptoms


   he is mildly volume overloaded based on CXR, elevated BNP, and crackles on 

lung auscultation


   was to have bi-V ICD implantation today with AVN ablation but found to have 

LA thrombus on TTE.[facility]





#. AF: permanent


   Dr. Massey feels RVR is contributing to worsening CHF


   his Metoprolol dose was just increased at his OV yesterday


   will keep on tele and evaluate





#. LA thrombus


   he has been on coumadin but there is some concern about medical noncompliance


   will change to Xarelto 20 mg daily/ R/B/A reviewed





#. CKD: at b/l currently





#. CAD: no cp currently


   continue Plavix and med mgmt for CAD














01/19/17 15:53





Subjective: 


Feels improved. No cp currently.


Reviewed/Discussed With: hospitalist


Objective: 





 Vital Signs (8 Hrs)











  Temp Pulse Resp BP Pulse Ox


 


 01/19/17 14:47   107 H  25 H  103/73  93


 


 01/19/17 08:48  97.3 F  103 H  18  98/72 L  97








 Intake/Output (24 Hrs)











 01/18/17 01/19/17 01/20/17





 05:59 05:59 05:59


 


Other:   


 


  Weight   83.461 kg











Result Diagrams: 


 01/19/17 10:05





 01/19/17 10:05





- Physical Exam


Constitutional: no apparent distress, No general pain


Eyes: PERRL, anicteric sclera


Ears, Nose, Mouth, Throat: moist mucous membranes


Cardiovascular: systolic murmur, irregularly irregular


Respiratory: reduced air movement, inspiratory crackles


Gastrointestinal: normoactive bowel sounds, no tenderness


Genitourinary: No justin in urethra


Skin: no ulcers, warm


Psychiatric: following commands, No anxious





ICD10 Worksheet


Patient Problems: 


 Problems











Problem Status Diagnosed


 


Acute decompensated heart failure Acute 


 


Atrial fibrillation Acute 


 


Cardiomyopathy Acute 


 


Chest pain at rest Acute 


 


Congestive heart failure Acute 


 


Constipation Acute 


 


Nephrolithiasis Acute 


 


Ureterolithiasis Acute 


 


Acute exacerbation of CHF (congestive heart failure) Acute 


 


Atrial fibrillation with rapid ventricular response Acute 


 


Chest pain Acute 


 


Chest pain in adult Acute 


 


Confusion with nonfocal neurological examination Acute 


 


Near syncope Acute

## 2017-01-19 NOTE — CPEKG
Heart Rate: 88

RR Interval: 682

QRSD Interval: 166

QT Interval: 404

QTC Interval: 489

QRS Axis: 82

T Wave Axis: 56

EKG Severity - ABNORMAL ECG -

EKG Impression: ATRIAL FIBRILLATION, V-RATE 

EKG Impression: MULTIFORM VENTRICULAR PREMATURE COMPLEXES

EKG Impression: RIGHT BUNDLE BRANCH BLOCK

EKG Impression: BORDERLINE ST DEPRESSION, LATERAL LEADS

Electronically Signed By: Simon Burr 19-Jan-2017 15:04:09

## 2017-01-19 NOTE — CPEKG
Heart Rate: 114

RR Interval: 526

QRSD Interval: 170

QT Interval: 392

QTC Interval: 540

QRS Axis: 78

T Wave Axis: 15

EKG Severity - ABNORMAL ECG -

EKG Impression: ATRIAL FIBRILLATION, V-RATE 

EKG Impression: PAIRED VENTRICULAR PREMATURE COMPLEXES

EKG Impression: RBBB AND LPFB

Electronically Signed By: Steven Herbert 19-Jan-2017 06:53:10

## 2017-01-19 NOTE — GHP
[f rep st]



                                                            HISTORY AND PHYSICAL





DATE OF ADMISSION:  01/19/2017



CHIEF COMPLAINT:  Chest pain and shortness of breath.



HISTORY:  The patient is a 76-year-old male with multiple recurrent admissions for acute CHF exacerba
tion and chest pain.  He has a known ejection fraction of only 25%.  He follows closely with Dr. Logan saab.  There has been concern about his cognitive ability, as he is clearly noncompliant with medication
s although he adamantly denies this.  He always presents with an INR near normal despite being on Cou
madin, and the digoxin level also often undetectable.  He usually presents after acute onset of short
ness of breath in the middle of the night.  This episode is the exact same as last time.  He was awok
en at 2 a.m. with acute onset of shortness of breath and chest pain.  He states it persisted for 2 ho
urs until 4 a.m. when he finally called 911.  He describes an anterior chest tightness.  He is curren
tly chest pain free.  He has not had any increased lower extremity edema.



PAST MEDICAL HISTORY:  

1.  Systolic congestive heart failure, ejection fraction 25%.

2.  Coronary disease, status post stent to the LAD, November 2016.

3.  Chronic kidney disease.

4.  Atrial fibrillation.

5.  Kidney stones, status post stent.

6.  Dementia.



PAST SURGICAL HISTORY:  Appendectomy.



MEDICATIONS:  Please see computer record for full detailed list.



ALLERGIES:  Atorvastatin.



SOCIAL HISTORY:  No smoking.  No alcohol.  He lives alone.



REVIEW OF SYSTEMS:  Complete review of systems obtained.  Review of systems is negative regarding con
stitutional, HEENT, GI, pulmonary, vascular, , hematology, skin, musculoskeletal, endocrine, psych,
 except for positive and pertinent negatives as in HPI.



FAMILY HISTORY:  Reviewed and noncontributory to presenting complaint.



PHYSICAL EXAMINATION:  GENERAL:  Well-developed, well-nourished male in no acute distress.  VITAL SIG
NS:  Blood pressure 154/88, pulse 113, respiration rate 16, saturating 94% on room air, temperature i
s 36.9.  HEENT:  Normal conjunctivae.  Pupils equal, round, reactive to light.  ENT:  Normal ears and
 nose.  Hearing intact.  Normal lips.  Normal teeth.  Oropharynx moist.  NECK:  Trachea midline.  No 
thyromegaly.  CHEST:  Normal respiratory effort.  LUNGS:  Clear to auscultation bilaterally.  CARDIOV
ASCULAR:  Regular rate and rhythm.  No murmur.  No lower extremity edema.  ABDOMEN:  Soft, nontender.
  No hepatosplenomegaly.  SKIN:  Warm, dry, intact without rash.  MUSCULOSKELETAL:  No cyanosis or cl
ubbing.  Strength 5/5 bilateral upper and lower extremities.  NEURO:  Cranial nerves intact.  Normal 
sensation to light touch.  PSYCH:  Alert and oriented x3.  Normal mood and affect.  Normal judgment a
nd insight.  Normal memory.



LABS:  White count 9.0, hematocrit 47, platelets 218.  Sodium 144, potassium 4.5, chloride 110, bicar
b 19, BUN 20, creatinine 1.2, glucose 119.  LFTs are normal.  Troponin 0.105.  BNP is 16,000.  Lipase
 is 202.  INR is 1.37.  Digoxin level 0.6. 



EKG interpreted by me.  My personal interpretation is atrial fibrillation with a heart rate of 114.  
Suspect ST changes relatively stable. 



Chest x-ray shows mild pulmonary edema.  Echocardiogram shows severely dilated left atrium with a pos
sible mobile left atrial mass.



ASSESSMENT/PLAN:  

1.  Recurrent congestive heart failure exacerbation, systolic ejection fraction 25%.  Suspect this is
 due to medication noncompliance.  He has received a dose of IV Lasix in the emergency room and now a
ppears to be approaching baseline.  We will resume his usual medications and monitor.

2.  Left atrial thrombus.  This was found on initial echo and Cardiology has since brought him to [facility]
 and confirmed.  This is likely due to his warfarin noncompliance.  I have spoken with Joan Manzanares,
 who intends to switch him to Xarelto.  Hopefully, this will improve his compliance. 

3.  Coronary artery disease, status post recent stent in November with triple therapy with aspirin, P
lavix and warfarin.  His greater than 30 days out from his stent, so aspirin can be discontinued and 
he is to continue on Plavix and anticoagulation only.  We will continue to follow troponins. 

4.  Chronic kidney disease.  Currently at baseline creatinine of 1.2. 

5.  Atrial fibrillation.  We will continue metoprolol. 

6.  Cognitive issues.  Neurology has seen him in consultation in the past.  They did feel he had a mi
ld dementia.  It is unclear whether this is truly significant, however, because at times he has a thi
y good memory for recent details.  There has been some question of malingering.  Unclear what his mot
ivations are.  His story is inconsistent regarding his home compliance and what we are seeing when he
 presents.  We will have speech therapy see him for a repeat of formal cognitive evaluation as it has
 not been done in some time.  I have also asked Tiny Stockton of Psychiatry to see him to see whether th
ere may be some underlying psych contribution to his poor compliance.  We did do a tox screen in the 
past that was unremarkable.



CODE STATUS:  Full.



ADMISSION STATUS:  

1.  We will admit to inpatient as with this left atrial thrombus we will definitely need further obse
rvation to ensure stability and compliance is obtained at the time of hospital discharge.

2.  DVT prophylaxis.  He is high risk.  Cardiology likely to put him on Xarelto soon.





Job #:  [id number]

## 2017-01-19 NOTE — ECHO
[id number]

[id number]



+---------------------------------------------------+      [address]

:                                                   :       [place] [address]

:                                                   :           [phone]

+---------------------------------------------------+       Fax [phone]



                       Adult Echocardiographic Report



+----------------------------------------------------------------------------

------+

:Name: HAL GARCIA CStudy Date: 2017 07:43 AM                       

      :

:MRN: [id number]       Hospital Admission Number: [id number]

n: ER5:

:: 1940       Gender: Male                           Height: 71 in  

      :

:Age: 76 yrs           Race: WH,UN,U                          Weight: 184 lb 

      :

:Reason For Study: Eval LV Fx                                                

      :

:                                                             BSA: 2.0 meters

2     :

:History: Acute SOB, A-Fib                                                   

      :

+----------------------------------------------------------------------------

------+

MMode/2D Measurements & Calculations

IVSd: 1.4 cm        LVIDd: 9.2 cm FS: 9.6 %           LVLd ap4: 8.9 cm

LVPWd: 1.3 cm       LVIDs: 8.3 cm EDV(Teich): 466.5 mlEDV(MOD-sp4): 304.0 ml

                                  ESV(Teich): 373.1 mlLVLs ap4: 9.1 cm

                                  EF(Teich): 20.0 %   ESV(MOD-sp4): 243.0 ml

                                                      EF(MOD-sp4): 20.1 %

        _____________________________________________________________



SV(MOD-sp4): 61.0 ml



Normal Measurement Values:

+----------------------------------------------------------------------------

----------------------------------+

:LVIDd (3.5-5.7cm)    IVSd (0.6-1.1cm)     LVPWd (0.6-1.1cm)     Aortic Root 

(2.0-3.7cm)Left Atrium (1.5-4.0cm):

:LV Vol(d) (76-115ml) LV Vol(s) (29-48ml)  Ejec Fraction (50-65%)PV Vinny (0.6-

1.2m/s)    TV Vinny (0.4-1.0m/s)    :

:MV E Vinny (0.8-1.0m/s)MV A Vinny (0.3-1.0m/s)LVOT Vinny (0.7-1.2m/s) Asc Ao Vinny (

0.9-1.8m/s)                       :

+----------------------------------------------------------------------------

----------------------------------+

Doppler Measurements & Calculations

TR max vinny: 303.0 cm/sec

TR max P.7 mmHg

RAP systole: 10.0 mmHg

RVSP(TR): 46.7 mmHg



Left Ventricle

The left ventricle is severely dilated. There is mild to moderate concentric

left ventricular hypertrophy. The rhythm is atrial fibrillation. Ejection

Fraction = 20%%. There is severe global hypokinesis of the left ventricle.

There is severe apical wall akinesis.







Atria

Known Severly dilated LA...on some views a possible mobile LA mass

noted..d/w dr Perry who will perform DINORAH to further evaluate.



Mitral Valve

There is moderate to severe mitral regurgitation.





Tricuspid Valve

There is mild tricuspid regurgitation. Right ventricular systolic pressure

is 47mmHg. There is Doppler evidence for moderate pulmonary hypertension.



Aortic Valve

Moderate aortic regurgitation.



Pericardium/Pleural

There is no pericardial effusion.



Conclusion

This is a limited echo to evaluate for LV function.

The left ventricle is severely dilated.

There is mild to moderate concentric left ventricular hypertrophy.

There is severe global hypokinesis of the left ventricle.

The rhythm is atrial fibrillation.

There is moderate to severe mitral regurgitation.

There is mild tricuspid regurgitation.

Right ventricular systolic pressure is 47mmHg.

There is Doppler evidence for moderate pulmonary hypertension.

Moderate aortic regurgitation.

There is no pericardial effusion.



Ejection Fraction = 20%%.

There is severe apical wall akinesis.

Known Severly dilated LA...on some views a possible mobile LA mass

noted..d/w dr Perry who will perform DINORAH to further evaluate





_____________________________________________________________________________





Final Reading Physician:

                        Rehana Kramer signed on 2017 11:57 AM

Ordering Physician: Steven Herbert

Performed By: Chase Castro, RDCS

## 2017-01-19 NOTE — DX
Portable AP Upright Chest, at 5:36 a.m.



Clinical History: 76-year-old male with chest pain.



COMPARISON STUDY: Chest, dated January 13, 2017, at 3:50 a.m.



FINDINGS: Oxygen tubing and telemetry monitoring lead lines are present. The cardiac silhouette remai
ns enlarged. There is mild peribronchial thickening, and the pulmonary vasculature is equalized, alth
ough there is no peripheral interstitial edema, focal infiltrate, pleural effusion, or pneumothorax. 
The osseous structures are age-appropriate, and unchanged.



IMPRESSION: Cardiac silhouette enlargement with mild pulmonary venous hypertension, but no evidence o
f interstitial edema or focal infiltrate.

## 2017-01-20 LAB
% IMMATURE GRANULYOCYTES: 0.1 % (ref 0–1.1)
ABSOLUTE IMMATURE GRANULOCYTES: 0.01 10^3/UL (ref 0–0.1)
ABSOLUTE NRBC COUNT: 0 10^3/UL (ref 0–0.01)
ADD DIFF?: NO
ADD MORPH?: NO
ADD SCAN?: NO
ANION GAP SERPL CALC-SCNC: 8 MEQ/L (ref 8–16)
ATYPICAL LYMPHOCYTE FLAG: 0 (ref 0–99)
CALCIUM SERPL-MCNC: 8.7 MG/DL (ref 8.5–10.4)
CHLORIDE SERPL-SCNC: 110 MEQ/L (ref 97–110)
CHOLEST SERPL-MCNC: 138 MG/DL (ref 140–220)
CHOLEST/HDLC SERPL: 5.31 RATIO (ref 1–4.97)
CO2 SERPL-SCNC: 22 MEQ/L (ref 22–31)
CREAT SERPL-MCNC: 1.3 MG/DL (ref 0.7–1.3)
ERYTHROCYTE [DISTWIDTH] IN BLOOD BY AUTOMATED COUNT: 14.3 % (ref 11.5–15.2)
FRAGMENT RBC FLAG: 0 (ref 0–99)
GFR SERPL CREATININE-BSD FRML MDRD: 54 ML/MIN/{1.73_M2}
GLUCOSE SERPL-MCNC: 91 MG/DL (ref 70–100)
HCT VFR BLD CALC: 42.8 % (ref 40–51)
HGB BLD-MCNC: 14.3 G/DL (ref 13.7–17.5)
HIGH DENSITY LIPOPROTEIN: 26 MG/DL (ref 40–65)
LDLC/HDLC SERPL: 3.19 RATIO (ref 1–3.64)
LEFT SHIFT FLG: 0 (ref 0–99)
LIPEMIA HEMOLYSIS FLAG: 80 (ref 0–99)
LOW DENSITY LIPOPROTEIN: 83 MG/DL (ref 80–100)
MCH RBC BLDCO QN: 31.2 PG (ref 27.9–34.1)
MCHC RBC AUTO-ENTMCNC: 33.4 G/DL (ref 32.4–36.7)
MCV RBC AUTO: 93.4 FL (ref 81.5–99.8)
NON-HIGH DENSITY LIPOPROTEIN: 112 MG/DL (ref 90–129)
NRBC-AUTO%: 0 % (ref 0–0.2)
PLATELET # BLD: 164 10^3/UL (ref 150–400)
PLATELET CLUMPS FLAG: 0 (ref 0–99)
PMV BLD AUTO: 9.6 FL (ref 8.7–11.7)
POTASSIUM SERPL-SCNC: 4.2 MEQ/L (ref 3.5–5.2)
RBC # BLD AUTO: 4.58 10^6/UL (ref 4.4–6.38)
SODIUM SERPL-SCNC: 140 MEQ/L (ref 134–144)
TRIGL SERPL-MCNC: 148 MG/DL (ref 40–150)
TROPONIN I SERPL-MCNC: 0.11 NG/ML (ref 0–0.03)
VERY LOW DENSITY LIPOPROTEINS: 29 MG/DL (ref 8–25)

## 2017-01-20 RX ADMIN — FUROSEMIDE SCH MG: 40 TABLET ORAL at 08:41

## 2017-01-20 RX ADMIN — SPIRONOLACTONE SCH MG: 25 TABLET, FILM COATED ORAL at 08:40

## 2017-01-20 RX ADMIN — DIGOXIN SCH MCG: 125 TABLET ORAL at 20:57

## 2017-01-20 RX ADMIN — RIVAROXABAN SCH MG: 10 TABLET, FILM COATED ORAL at 18:46

## 2017-01-20 RX ADMIN — CLOPIDOGREL BISULFATE SCH MG: 75 TABLET, FILM COATED ORAL at 08:39

## 2017-01-20 RX ADMIN — LOSARTAN POTASSIUM SCH MG: 25 TABLET, FILM COATED ORAL at 08:41

## 2017-01-20 RX ADMIN — ALLOPURINOL SCH MG: 300 TABLET ORAL at 08:41

## 2017-01-20 NOTE — SOAPPROG
SOAP Progress Note


Assessment/Plan: 


Assessment:








77 y/o man with CAD s/p previous stents most recently LAD PCI in 11/16 with 

chronic ischemic systolic CHF with LVEF 17%, moderate AI and severe MR, 

permanent afib with RVR. He is doing better but not ready for discharge yet. We 

have corrected all aspects but his fast afib and his valvular issues. He is not 

a VAD, OHT or multivalvuar replacement candidate. REC:





1)increase Toprol XL to 150mg PO qday (give extra 50mg PO now).


2)rest of meds without changes.


3)repeat labs in AM (1/21): CBC, BMP and BNP level


4)if afib rate < 90bpm most of time and feels well, can discharge home Saturday.


5)I have scheduled f/u [facility] Monday 1/23 with me.


6)planned AV maria c ablation and BIVAICD in four weeks with Dr. Perry.





Thanks.














01/20/17 12:14





Subjective: 


feels better. Denies CP, PND or orthopnea. Walked hallway with PT with mild 

PADILLA. No dizziness.


Objective: 





 Vital Signs











Temp Pulse Resp BP Pulse Ox


 


 37.2 C   82   14   109/62   92 


 


 01/20/17 10:55  01/20/17 10:55  01/20/17 10:55  01/20/17 10:55  01/20/17 10:55








 Laboratory Results





 01/20/17 04:20 





 01/20/17 04:20 





 











 01/19/17 01/20/17 01/21/17





 05:59 05:59 05:59


 


Intake Total  2290 


 


Output Total  1800 


 


Balance  490 








 











PT  16.9 SEC (12.0-15.0)  H  01/19/17  10:05    


 


INR  1.37  (0.83-1.16)  H  01/19/17  10:05    














Physical Exam





- Physical Exam


EENT: PERRL/EOMI


Neck: non-tender


Respiratory: lungs clear


Cardiac/Chest: gallop, JVD (JVP to 8cm.), systolic murmur, irregularly irregular


Peripheral Pulses: 2+: carotid (R), carotid (L), femoral (R), femoral (L), 

dorsalis-pedis (R), dorsalis-pedis (L)


Abdomen: normal bowel sounds, non-tender, soft, No organomegaly, No ascites


Skin: warm/dry


Extremities: No pedal edema


Neuro/Psych: alert





ICD10 Worksheet


Patient Problems: 


 Problems











Problem Status Diagnosed


 


Acute decompensated heart failure Acute 


 


Atrial fibrillation Acute 


 


Cardiomyopathy Acute 


 


Chest pain at rest Acute 


 


Congestive heart failure Acute 


 


Constipation Acute 


 


Nephrolithiasis Acute 


 


Ureterolithiasis Acute 


 


Acute exacerbation of CHF (congestive heart failure) Acute 


 


Atrial fibrillation with rapid ventricular response Acute 


 


Chest pain Acute 


 


Chest pain in adult Acute 


 


Confusion with nonfocal neurological examination Acute 


 


Near syncope Acute

## 2017-01-20 NOTE — CPEKG
Heart Rate: 94

RR Interval: 638

QRSD Interval: 166

QT Interval: 412

QTC Interval: 516

QRS Axis: 70

T Wave Axis: 108

EKG Severity - ABNORMAL ECG -

EKG Impression: ATRIAL FIBRILLATION, V-RATE 

EKG Impression: MULTIFORM VENTRICULAR PREMATURE COMPLEXES

EKG Impression: RBBB AND LPFB

EKG Impression: BORDERLINE ST DEPRESSION, LATERAL LEADS

Electronically Signed By: Joe Maria 21-Jan-2017 13:10:59

## 2017-01-20 NOTE — HOSPPROG
Hospitalist Progress Note


Assessment/Plan: 


*  CHF - EF 17% - acute on chronic systolic


   -recurrent admissions for medication non-compliance


   -cognitive testing by ST completely normal


   -patient now admits to being "creative" with his meds


   -counselled at length regarding life threatening nature of his non-compliance


   -s/p IV Lasix


   -continue Cozaar, PO Lasix, Aldactone


   -previously decline AICD


      -now agreeable but has new LA clot - needs 1 month anticoag before 

procedure


      -bi-V ICD in 4 weeks with Dr. Perry planned


      -consider life vest


*  Rapid afib


   -increasing metoprolol per Dr. Massey


   -eventual AVN ablation at time of defibrillator placement


*  LA thrombus


   -on chronic warfarin but INR < 1.5 with each presentation


   -change to Xarelto for improved compliance


*  CAD/stent - November 2016


   -continue ASA/Plavix


   -chronic low level troponin elevation


*  Hyperlipidemia - atorvastatin allergy


*  CKD - baseline creatinine 1.2














Subjective: Feels good


Objective: 


 Vital Signs











Temp Pulse Resp BP Pulse Ox


 


 37.2 C   94   14   109/62   92 


 


 01/20/17 10:55  01/20/17 12:27  01/20/17 10:55  01/20/17 12:27  01/20/17 10:55








 Laboratory Results





 01/20/17 04:20 





 01/20/17 04:20 





 











 01/19/17 01/20/17 01/21/17





 05:59 05:59 05:59


 


Intake Total  2290 


 


Output Total  1800 475


 


Balance  490 -475








 











PT  16.9 SEC (12.0-15.0)  H  01/19/17  10:05    


 


INR  1.37  (0.83-1.16)  H  01/19/17  10:05    








d/w Joan CUNHA regarding strategies for improved compliance


EKG viewed, my personal interpretation is:  afib, rate 94








- Physical Exam


Constitutional: no apparent distress, appears nourished, not in pain


Cardiovascular: regular rate and rhythym, no murmur, rub, or gallop


Respiratory: no respiratory distress, no rales or rhonchi, clear to auscultation


Gastrointestinal: normoactive bowel sounds, soft, non-tender abdomen, no 

palpable masses


Skin: no rashes or abrasions, no fluctuance, no induration


Neurologic: AAOx3, sensation intact bilaterally


Psychiatric: interacting appropriately, not anxious, not encephalopathic, 

thought process linear





ICD10 Worksheet


Patient Problems: 


 Problems











Problem Status Diagnosed


 


Acute decompensated heart failure Acute 


 


Atrial fibrillation Acute 


 


Cardiomyopathy Acute 


 


Chest pain at rest Acute 


 


Congestive heart failure Acute 


 


Constipation Acute 


 


Nephrolithiasis Acute 


 


Ureterolithiasis Acute 


 


Acute exacerbation of CHF (congestive heart failure) Acute 


 


Atrial fibrillation with rapid ventricular response Acute 


 


Chest pain Acute 


 


Chest pain in adult Acute 


 


Confusion with nonfocal neurological examination Acute 


 


Near syncope Acute

## 2017-01-21 LAB
ANION GAP SERPL CALC-SCNC: 10 MEQ/L (ref 8–16)
CALCIUM SERPL-MCNC: 9.1 MG/DL (ref 8.5–10.4)
CHLORIDE SERPL-SCNC: 106 MEQ/L (ref 97–110)
CO2 SERPL-SCNC: 24 MEQ/L (ref 22–31)
CREAT SERPL-MCNC: 1.4 MG/DL (ref 0.7–1.3)
ERYTHROCYTE [DISTWIDTH] IN BLOOD BY AUTOMATED COUNT: 14 % (ref 11.5–15.2)
GFR SERPL CREATININE-BSD FRML MDRD: 49 ML/MIN/{1.73_M2}
GLUCOSE SERPL-MCNC: 93 MG/DL (ref 70–100)
HCT VFR BLD CALC: 42.6 % (ref 40–51)
HGB BLD-MCNC: 14.4 G/DL (ref 13.7–17.5)
LIPEMIA HEMOLYSIS FLAG: 90 (ref 0–99)
MCH RBC BLDCO QN: 32.2 PG (ref 27.9–34.1)
MCHC RBC AUTO-ENTMCNC: 33.8 G/DL (ref 32.4–36.7)
MCV RBC AUTO: 95.3 FL (ref 81.5–99.8)
PLATELET # BLD: 168 10^3/UL (ref 150–400)
PLATELET CLUMPS FLAG: 0 (ref 0–99)
POTASSIUM SERPL-SCNC: 4.4 MEQ/L (ref 3.5–5.2)
RBC # BLD AUTO: 4.47 10^6/UL (ref 4.4–6.38)
SODIUM SERPL-SCNC: 140 MEQ/L (ref 134–144)

## 2017-01-21 RX ADMIN — ONDANSETRON PRN MG: 2 SOLUTION INTRAMUSCULAR; INTRAVENOUS at 21:32

## 2017-01-21 RX ADMIN — METOPROLOL SUCCINATE SCH MG: 50 TABLET, EXTENDED RELEASE ORAL at 08:33

## 2017-01-21 RX ADMIN — RIVAROXABAN SCH MG: 10 TABLET, FILM COATED ORAL at 19:01

## 2017-01-21 RX ADMIN — SPIRONOLACTONE SCH MG: 25 TABLET, FILM COATED ORAL at 08:32

## 2017-01-21 RX ADMIN — ALLOPURINOL SCH MG: 300 TABLET ORAL at 08:32

## 2017-01-21 RX ADMIN — DIGOXIN SCH MCG: 125 TABLET ORAL at 21:41

## 2017-01-21 RX ADMIN — LOSARTAN POTASSIUM SCH MG: 25 TABLET, FILM COATED ORAL at 08:33

## 2017-01-21 RX ADMIN — CLOPIDOGREL BISULFATE SCH MG: 75 TABLET, FILM COATED ORAL at 08:32

## 2017-01-21 RX ADMIN — FUROSEMIDE SCH MG: 40 TABLET ORAL at 08:34

## 2017-01-21 RX ADMIN — ONDANSETRON PRN MG: 2 SOLUTION INTRAMUSCULAR; INTRAVENOUS at 08:28

## 2017-01-21 NOTE — PDCARPN
Cardiology Progress Note


Chief Complaint: 


Continuation of shortness of breath and fatigue


Assessment/Plan: 


Assessment:


76-year-old male with history of CAD status post PCI of LAD/2016, permanent 

atrial fibrillation ischemic cardiomyopathy, and history medication 

noncompliance.  Admitted hospital worsening shortness of rest, admitted for CHF 

exacerbation.  Noted to have elevated troponin 16,100 on admission.  Limited 

Echocardiogram (1/19/2017) showed severely dilated LV mild-to-moderate 

concentric LVH severe global hypokinesis, EF decreased to 20%, moderate to 

severe MR, mild TR, moderate AI,  RVSP 47 mm Hg.  Was plan to undergo Bi V AICD 

implantation on 01/19/2017, DINORAH noted left atrial appendage thrombus.  Patient 

noted to have long history of subtherapeutic INRs, recently had been switched 

to Xarelto warfarin for hopefully better medication compliance.  AV node 

ablation/Bi V AICD procedure delayed for a minimum of 4 weeks.  IV diuresed and 

switch back to home dose of Lasix.  Increased Toprol XL to 150 mg q.day 

yesterday for better rate control AFib.  Patient reporting overnight no chest 

pain pressure or pressure.  Troponins have been flat since hospital admission.  

Denies of any palpitations or lightheadedness.  Does report mild shortness of 

breath.  BNP improved with medication compliance, [place] [id number].  

Continues cardiac monitor showing AFib, occasional premature ventricular 

contraction, no significant pauses noted.





Plan:


1.  Systolic heart failure, NYHA class III-IV:  Improvement in symptoms with 

better rate control with Toprol.  No change in current dosage.  Continue on 

Cozaar, Aldactone, and Lasix dosage.  Plan on Bi V AICD implantation in the 

next 4 weeks.





2.  Permanent AF:  Better rate control on higher dose of Toprol and digoxin, no 

change at this time. Noted LA thrombus on DINORAH, history of noncompliance he on 

warfarin, switched over to Xarelto.  AV maria c ablation to be done at the same 

time Bi V AICD implanted





3.  CAD:  Patient denies any chest pressure, history of previous stent to the 

LAD 11/2016.  The troponin since hospital admission, probably due worsening 

systolic CHF.  In greater than 30 days since PCI, ASA discontinued due to being 

on Xarelto, continue on clopidogrel.  Mildly flat elevated troponin to 

hospitalization.  Repeat troponin level today to make sure downward decline.  

Reviewing our office notes, patient had been on simvastatin in the past, for 

our outpatient notes patient had self discontinued.  When asked, patient is 

uncertain why he stopped it.  He has history of allergy to atorvastatin, will 

revisit with her starting statin therapy as an outpatient.





4. Renal insufficiency: mildly elevated creatinine today of 1.4, probable due 

to diuresis, no changes at this time, but will monitor as an outpatient.





5. LA thrombus:  Patient with history of noncompliance he on warfarin therapy, 

recently switched to Xarelto for hopes of better compliance.  Re-evaluate in 4 

weeks.





6. Risk of sudden cardiac death:  With patient's history of CAD, dilated 

cardiomyopathy with EF of less than 35%, does place him at high risk sudden 

cardiac death.  AICD implantation held due to recent LA thrombus.  Patient does 

meet criteria life vest until AICD implantation can be done.  Long discussion 

with patient today with recommendation of having life vest as outpatient until 

AICD implantation can be done.  He verbalizes understanding risks and benefits, 

does not want to have life vest fitted at this time, if he changes his mind, he 

will notify our office.





Pending on troponin level, patient could potentially be discharged today.  He 

has follow-up office visit set with Dr. Diane on Monday. 











01/21/17 10:33








Subjective: 


Patient reports improvement in his dyspnea on exertion, but still experiences 

shortness of breath after walking 20-30 feet.  Denies of any chest pain or 

pressure.  Denies of any palpitations.


Reviewed/Discussed With: hospitalist (Dr Guillen), other (Dr Campa)


Objective: 





 Vital Signs (8 Hrs)











  Temp Pulse Resp BP Pulse Ox


 


 01/21/17 08:33   80   115/62 


 


 01/21/17 07:56  36.8 C  80  16  115/62  96


 


 01/21/17 04:00  36.8 C  55 L  20  107/70  94








 Intake/Output (24 Hrs)











 01/20/17 01/21/17 01/22/17





 05:59 05:59 05:59


 


Intake Total 2290 700 


 


Output Total 1800 1425 100


 


Balance 490 -725 -100


 


Intake:   


 


  Oral (ml) 1540 700 


 


  IV Infused (ml) 750  


 


    Ns 1,000 ml @ [facility]   





    ONCALL ONE Rx#:[id number]   


 


Output:   


 


  Urine (ml) 1800 1425 100


 


    Urinal 1300 1425 100


 


    Catheter 500  


 


Other:   


 


  Weight 83.461 kg 83.2 kg 


 


  Output Comment   


 


    Urinal  It was hard to tell if it was brown or dark red. 


 


    Catheter catheter placed in cath lab for procedure and removed prior to 

xfer to unit  


 


  Number of Voids   


 


    Urinal 3 1 











Result Diagrams: 


 01/21/17 03:47





 01/21/17 03:47


Cardiac Labs: 





 Cardiac Lab Results (72 Hrs)











  01/20/17 01/19/17





  04:20 18:05


 


Troponin I  0.110 H  0.082 H














- Physical Exam


Constitutional: WDWN


Ears, Nose, Mouth, Throat: moist mucous membranes


Cardiovascular: no murmurs, systolic murmur (2 to 3/6 left sternal border), 

irregularly irregular (AFib with normal rate.), jugular vein distention (5-6 cm 

above sternal at 45 degree angle), pulses symmetric bilat, No carotid bruit


Peripheral Pulses: 1+: dorsalis-pedis (R), dorsalis-pedis (L), 2+: carotid (R), 

carotid (L)


Respiratory: other (Lungs diminished in bases bilateral, no rhonchi rales or 

wheezes noted.)


Gastrointestinal: normoactive bowel sounds


Skin: warm, no edema


Neurologic: AAOx3


Psychiatric: cooperative, anxious





ICD10 Worksheet


Patient Problems: 


 Problems











Problem Status Diagnosed


 


Acute decompensated heart failure Acute 


 


Atrial fibrillation Acute 


 


Cardiomyopathy Acute 


 


Chest pain at rest Acute 


 


Congestive heart failure Acute 


 


Constipation Acute 


 


Nephrolithiasis Acute 


 


Ureterolithiasis Acute 


 


Acute exacerbation of CHF (congestive heart failure) Acute 


 


Atrial fibrillation with rapid ventricular response Acute 


 


Chest pain Acute 


 


Chest pain in adult Acute 


 


Confusion with nonfocal neurological examination Acute 


 


Near syncope Acute

## 2017-01-21 NOTE — HOSPPROG
Hospitalist Progress Note


Assessment/Plan: 


#Acutely decompensated systolic HF (EF 17%)


-due to med noncompliance


-agreeable to AICD now, but has to be anticoagulated 4 weeks with new LV 

thrombus before procedure


-cont ARB, lasix, digoxin, BB


-at high risk for sudden cardiac death. Cards had lengthy conversation with him 

and declines vest at this time





#LV thrombus


-Xarelto





#Permanent a fib


-BB, digoxin


-Xarelto





#CAD


-ASA, BB, Plavix. Not currently on statin bc patient stopped it. Will readdress 

outpatient





#DYLAN: due to diuresis. Cont current lasix dose





#Diet: cardiac





#DVT ppx: on Xarelto





Disp: plan for DC in morning











Time spent on visit: 60 min in which >50% spent counseling on medication 

compliance, treatment plan


Subjective: CP and SOB this morning


Objective: 


 Vital Signs











Temp Pulse Resp BP Pulse Ox


 


 36.8 C   80   16   115/62   96 


 


 01/21/17 07:56  01/21/17 08:33  01/21/17 07:56  01/21/17 08:33  01/21/17 07:56








 Laboratory Results





 01/21/17 03:47 





 01/21/17 03:47 





 











 01/20/17 01/21/17 01/22/17





 05:59 05:59 05:59


 


Intake Total 2290 700 


 


Output Total 1800 1425 100


 


Balance 490 -725 -100








 











PT  16.9 SEC (12.0-15.0)  H  01/19/17  10:05    


 


INR  1.37  (0.83-1.16)  H  01/19/17  10:05    














- Physical Exam


Constitutional: no apparent distress


Eyes: PERRL


Ears, Nose, Mouth, Throat: moist mucous membranes


Cardiovascular: regular rate and rhythym, edema (trace LE edema)


Respiratory: no respiratory distress, no rales or rhonchi


Gastrointestinal: normoactive bowel sounds, soft, non-tender abdomen


Genitourinary: no bladder fullness


Skin: warm


Musculoskeletal: full muscle strength


Neurologic: AAOx3


Psychiatric: anxious





ICD10 Worksheet


Patient Problems: 


 Problems











Problem Status Diagnosed


 


Acute decompensated heart failure Acute 


 


Atrial fibrillation Acute 


 


Cardiomyopathy Acute 


 


Chest pain at rest Acute 


 


Congestive heart failure Acute 


 


Constipation Acute 


 


Nephrolithiasis Acute 


 


Ureterolithiasis Acute 


 


Acute exacerbation of CHF (congestive heart failure) Acute 


 


Atrial fibrillation with rapid ventricular response Acute 


 


Chest pain Acute 


 


Chest pain in adult Acute 


 


Confusion with nonfocal neurological examination Acute 


 


Near syncope Acute

## 2017-01-22 VITALS — OXYGEN SATURATION: 90 %

## 2017-01-22 VITALS
DIASTOLIC BLOOD PRESSURE: 69 MMHG | RESPIRATION RATE: 16 BRPM | TEMPERATURE: 98.6 F | HEART RATE: 70 BPM | SYSTOLIC BLOOD PRESSURE: 107 MMHG

## 2017-01-22 LAB
ANION GAP SERPL CALC-SCNC: 10 MEQ/L (ref 8–16)
CALCIUM SERPL-MCNC: 9 MG/DL (ref 8.5–10.4)
CHLORIDE SERPL-SCNC: 107 MEQ/L (ref 97–110)
CO2 SERPL-SCNC: 22 MEQ/L (ref 22–31)
CREAT SERPL-MCNC: 1.5 MG/DL (ref 0.7–1.3)
GFR SERPL CREATININE-BSD FRML MDRD: 45 ML/MIN/{1.73_M2}
GLUCOSE SERPL-MCNC: 93 MG/DL (ref 70–100)
MAGNESIUM SERPL-MCNC: 1.9 MG/DL (ref 1.6–2.3)
POTASSIUM SERPL-SCNC: 4.3 MEQ/L (ref 3.5–5.2)
SODIUM SERPL-SCNC: 139 MEQ/L (ref 134–144)

## 2017-01-22 RX ADMIN — SPIRONOLACTONE SCH MG: 25 TABLET, FILM COATED ORAL at 09:18

## 2017-01-22 RX ADMIN — METOPROLOL SUCCINATE SCH MG: 50 TABLET, EXTENDED RELEASE ORAL at 09:15

## 2017-01-22 RX ADMIN — CLOPIDOGREL BISULFATE SCH MG: 75 TABLET, FILM COATED ORAL at 09:09

## 2017-01-22 RX ADMIN — LOSARTAN POTASSIUM SCH MG: 25 TABLET, FILM COATED ORAL at 09:17

## 2017-01-22 RX ADMIN — FUROSEMIDE SCH MG: 40 TABLET ORAL at 09:18

## 2017-01-22 RX ADMIN — ALLOPURINOL SCH MG: 300 TABLET ORAL at 09:09

## 2017-01-22 NOTE — GDS
[f 
rep st]



                                                             DISCHARGE SUMMARY





DISCHARGE DIAGNOSES:  

1.  Acutely decompensated systolic heart failure EF 17% 

2.  Left atrial thrombus.

3.  Permanent atrial fibrillation.  

4.  Coronary artery disease.

5.  Acute kidney injury.

6.  Hypotension.



CONSULTATIONS:  Cardiology.



PROCEDURES:  Echocardiogram 01/19/2017: Limited echo.  The LV is severely 
dilated.  EF is 20%.  There is severe apical wall akinesis and then an LA mass 
was noted.



HPI:  Patient is a 76-year-old male with known heart failure and CAD with 
multiple admissions recently for CHF exacerbation and chest pain.  He had a 
known ejection fraction of 25%.  He follows with Dr. Massey.  There has been 
some concern about his cognitive ability as he is clearly noncompliant with his 
medications, although he denies this.  He presented day of admission with acute 
onset shortness of breath in the middle of the night.  Also with chest pain 
that lasted approximately 2 hours and then he finally called 9-1-1.  The pain 
was tight in nature.  He did not endorse PND, pillow orthopnea, or lower 
extremity edema.



HOSPITAL COURSE BY PROBLEM:  

1.  Acutely decompensated systolic heart failure, NYH class 3-4:  Patient was 
evaluated by Cardiology. TTE showed decreased EF to 20% with moderate to severe 
MR,TR, moderate AI.  Was to undergo BiV AICD implantation, but, DINORAH showed LA 
thrombus.  IV diuresis while here and Toprol-XL was increased to 150 mg daily.  
Had a mild bump in his creatinine day of discharge, so rec take only 40 mg of 
Lasix tomorrow and then follow up with Dr. Massey.

2.  Permanent atrial fibrillation: increased Toprol to 150 mg and digoxin. 
Changed to Xarelto since warfarin noncompliant. Plan for  AV maria c ablation to 
be done at the same time as Bi V AICD implantation.

3.  CAD:  no CP.  LAD stent November 2016.  Cont Xarelto, BB.  He stopped his 
statin. Reconsider as outpatient.

4.  DYLAN:  Creatinine bumped to 1.4-1.5.  He was taking 60 of Lasix.  Rec only 
take 40 tomorrow and then follow up with Dr. Massey with a repeat BMP.

5.  LA thrombus:  noncompliant with warfarin.  Switched to Xarelto x 4 weeks.

6.  Risks of sudden cardiac death:  Given complicated cardiac history of CAD, 
dilated cardiomyopathy, and reduced EF,  this does place him at a high risk of 
cardiac death.  Cardiology had several extensive conversations regarding a 
LifeVest until AICD could be implanted, but he declined.

7.  Mild hypotension: had a systolic of 88 this afternoon with some dizziness.  
The symptoms resolved and most recent blood pressure was 107.  His Toprol was 
just uptitrated to 150 yesterday.  I would  recommend repeat blood pressure in 
clinic tomorrow and determine if a dose reduction is needed.  

8.  Health care maintenance:  Query if patient has the cognitive ability to 
manage all these medications.  I offered a home health nurse to help assist.  
He states that he has 2 nurses who live by him who assist with those 
medications.  However, the concern is that he is noncompliant given he has had 
undetectable digoxin and low INRs when states was compliant with warfarin and 
digoxin.  Would readdress this as an outpatient.



DISPOSITION:  Patient is stable for discharge,



MEDICATIONS:  New medications: 

1.  Toprol- mg daily.

2.  Xarelto 20 mg daily.  

3.  Recommend patient take only 40 of Lasix tomorrow until he gets a repeat BMP 
with Dr. Massey.



FOLLOWUP:  With Dr. Msasey on Monday, January 23rd. 



Time spent on discharge 60 minutes, of which greater than 50% was spent on 
coordinating, explaining medications to the patient.





Job #:  [id number]

MTDD

## 2017-01-28 ENCOUNTER — HOSPITAL ENCOUNTER (OUTPATIENT)
Dept: HOSPITAL 80 - FED | Age: 77
Setting detail: OBSERVATION
LOS: 1 days | Discharge: HOME | End: 2017-01-29
Attending: INTERNAL MEDICINE | Admitting: INTERNAL MEDICINE
Payer: COMMERCIAL

## 2017-01-28 DIAGNOSIS — Z96.651: ICD-10-CM

## 2017-01-28 DIAGNOSIS — Z95.5: ICD-10-CM

## 2017-01-28 DIAGNOSIS — N39.0: ICD-10-CM

## 2017-01-28 DIAGNOSIS — Z96.0: ICD-10-CM

## 2017-01-28 DIAGNOSIS — I13.0: ICD-10-CM

## 2017-01-28 DIAGNOSIS — M10.9: ICD-10-CM

## 2017-01-28 DIAGNOSIS — K80.20: ICD-10-CM

## 2017-01-28 DIAGNOSIS — Z87.440: ICD-10-CM

## 2017-01-28 DIAGNOSIS — R31.9: Primary | ICD-10-CM

## 2017-01-28 DIAGNOSIS — I51.3: ICD-10-CM

## 2017-01-28 DIAGNOSIS — I50.22: ICD-10-CM

## 2017-01-28 DIAGNOSIS — I11.0: ICD-10-CM

## 2017-01-28 DIAGNOSIS — N20.0: ICD-10-CM

## 2017-01-28 DIAGNOSIS — I48.2: ICD-10-CM

## 2017-01-28 DIAGNOSIS — I25.10: ICD-10-CM

## 2017-01-28 DIAGNOSIS — B95.61: ICD-10-CM

## 2017-01-28 DIAGNOSIS — F03.90: ICD-10-CM

## 2017-01-28 DIAGNOSIS — N18.9: ICD-10-CM

## 2017-01-28 DIAGNOSIS — Z79.01: ICD-10-CM

## 2017-01-28 LAB
% IMMATURE GRANULYOCYTES: 0.1 % (ref 0–1.1)
ABSOLUTE IMMATURE GRANULOCYTES: 0.01 10^3/UL (ref 0–0.1)
ABSOLUTE NRBC COUNT: 0 10^3/UL (ref 0–0.01)
ADD DIFF?: NO
ADD MORPH?: NO
ADD SCAN?: NO
ANION GAP SERPL CALC-SCNC: 12 MEQ/L (ref 8–16)
APTT BLD: 44.8 SEC (ref 23–38)
ATYPICAL LYMPHOCYTE FLAG: 20 (ref 0–99)
BACTERIA #/AREA URNS HPF: (no result) /HPF
CALCIUM SERPL-MCNC: 9 MG/DL (ref 8.5–10.4)
CHLORIDE SERPL-SCNC: 106 MEQ/L (ref 97–110)
CO2 SERPL-SCNC: 22 MEQ/L (ref 22–31)
COLOR UR: (no result)
CREAT SERPL-MCNC: 1.3 MG/DL (ref 0.7–1.3)
ERYTHROCYTE [DISTWIDTH] IN BLOOD BY AUTOMATED COUNT: 13.9 % (ref 11.5–15.2)
FRAGMENT RBC FLAG: 0 (ref 0–99)
GFR SERPL CREATININE-BSD FRML MDRD: 54 ML/MIN/{1.73_M2}
GLUCOSE SERPL-MCNC: 111 MG/DL (ref 70–100)
HCT VFR BLD CALC: 45.2 % (ref 40–51)
HGB BLD-MCNC: 15.5 G/DL (ref 13.7–17.5)
INR PPP: 1.78 (ref 0.83–1.16)
LEFT SHIFT FLG: 0 (ref 0–99)
LIPEMIA HEMOLYSIS FLAG: 90 (ref 0–99)
MCH RBC BLDCO QN: 31.6 PG (ref 27.9–34.1)
MCHC RBC AUTO-ENTMCNC: 34.3 G/DL (ref 32.4–36.7)
MCV RBC AUTO: 92.2 FL (ref 81.5–99.8)
NITRITE UR QL STRIP: POSITIVE
NRBC-AUTO%: 0 % (ref 0–0.2)
PH UR STRIP: 6 [PH] (ref 5–7.5)
PLATELET # BLD: 205 10^3/UL (ref 150–400)
PLATELET CLUMPS FLAG: 10 (ref 0–99)
PMV BLD AUTO: 10 FL (ref 8.7–11.7)
POTASSIUM SERPL-SCNC: 3.9 MEQ/L (ref 3.5–5.2)
PROTHROMBIN TIME: 20.8 SEC (ref 12–15)
RBC # BLD AUTO: 4.9 10^6/UL (ref 4.4–6.38)
RBC #/AREA URNS HPF: (no result) /HPF (ref 0–3)
SODIUM SERPL-SCNC: 140 MEQ/L (ref 134–144)
SP GR UR STRIP: 1.01 (ref 1–1.03)
WBC #/AREA URNS HPF: (no result) /HPF (ref 0–3)

## 2017-01-28 PROCEDURE — G0378 HOSPITAL OBSERVATION PER HR: HCPCS

## 2017-01-28 PROCEDURE — 93005 ELECTROCARDIOGRAM TRACING: CPT

## 2017-01-28 PROCEDURE — 97161 PT EVAL LOW COMPLEX 20 MIN: CPT

## 2017-01-28 PROCEDURE — 76770 US EXAM ABDO BACK WALL COMP: CPT

## 2017-01-28 NOTE — US
Ultrasound Abdomen Retroperitoneal



Comparison: Abdomen x-ray from November 2016.



History: Left flank pain. Hematuria.



Findings: Left kidney measures 13.8 cm in length and the right kidney measures 14 cm in length. No si
gnificant hydronephrosis is seen in either kidney. Focal area of echogenicity and shadowing is seen i
n the superior pole of the left kidney and in the midpole right kidney indicating nephrolithiasis. Th
e one in the midpole right kidney measures 16 mm. A cyst is seen in the inferior pole the of right ki
dney, measuring 3.3 cm. Images of the bladder demonstrate significant debris in the bladder. The left
 ureteral stent is seen in the bladder. Bilateral ureteral jets are visualized. There is mild cortica
l thinning in both kidneys. Small gallstones are incidentally seen in the gallbladder without evidenc
e for gallbladder wall thickening or pericholecystic fluid.



Impression:

1. Bilateral nonobstructive nephrolithiasis. No significant hydronephrosis.

2. Ureteral stent is seen in the left bladder. Both ureteral jets are visualized. Debris in the bladd
er.

3. Incidental cholelithiasis.



Results called to Dr. Rubén Grimm.

## 2017-01-28 NOTE — GHP
[f rep st]



                                                            HISTORY AND PHYSICAL





DATE OF ADMISSION:  2017



HISTORY:  The patient is a pleasant 76-year-old gentleman with history of atrial fibrillation, recent
ly discovered left atrial clot, hematuria with nephrolithiasis and indwelling stent who presents with
 hematuria.  He was recently discharged and started on Xarelto for treatment of this left atrial clot
, given his previous history of apparent noncompliance with Coumadin.  The patient notes that he has 
had just started on it and developed some hematuria.  It got progressively redder and redder.  He has
 not had chest pain or shortness of breath.  He has not had fever or chills.  He has not had urgency,
 frequency, dysuria.  He does take a diuretic at baseline.  He denies an inability to empty his bladd
er. 



He has chronic pyuria and previous urinalyses have shown 50 to 182 white cells, and 3+ leukocyte dmitri
rase. 



Notably previous admissions mention some cognitive dysfunction.  When I speak with him in the emergen
cy department, he is relaying a story that sounds like summarization of his medical events over the p
revious number of years including a discussion with Dr. Lainez about needing operations of left kidney
 and stent placement by Dr. Gunter (I actually took care of him during this admission in ). 



In the emergency department, he was diagnosed with a urinary tract infection and given ceftriaxone.  
He had ultrasound of the abdomen and bladder showing a left ureteral stent in place with ureteral jet
s visualized bilaterally, there is debris in the bladder. 



He denies recent PND, orthopnea or lower extremity edema.



REVIEW OF SYSTEMS:  Complete 10-point review of systems conducted and negative except as noted in the
 HPI.



PAST MEDICAL HISTORY:  

1.  Atrial fibrillation, permanent.

2.  Systolic congestive heart failure with an EF of 25%.  

3.  Coronary artery disease, status post stent to the LAD in 2016.

4.  Chronic kidney disease.

5.  Likely dementia.

6.  Nephrolithiasis, with indwelling left ureteral stent.



ALLERGIES:  Atorvastatin.



MEDICATIONS:  Spironolactone, rivaroxaban, metoprolol succinate, losartan, furosemide, digoxin, clopi
dogrel, aspirin, allopurinol, acetaminophen.



SOCIAL HISTORY:  Denies significant tobacco or alcohol.  He lives alone.  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
ical  business.



FAMILY HISTORY:  Parents .



PHYSICAL EXAM:  VITAL SIGNS:  Presenting temp 36.5, blood pressure 119/85, pulse 81, breathing 20 adam
es a minute, 96% on room air.  GENERAL:  No acute distress.  HEENT:  Sclerae anicteric.  Oropharynx c
lear.  Mucous membranes are moist.  NECK:  Supple.  No lymphadenopathy or JVD.  LUNGS:  Clear to ausc
ultation bilaterally.  HEART:  S1 and S2.  ABDOMEN:  Soft, nontender, nondistended.  There is no supr
apubic tenderness.  There is no bladder fullness.  EXTREMITIES:  Lower without edema.  Calves nontend
er.  SKIN:  Without rash.  NEUROLOGIC:  Grossly nonfocal.  Patient has an element of apparent confabu
lation in his speech.



LABS:  White count 7.6, baseline hematocrit 45 and platelets of 205,000.  INR is 1.78.  Sodium 140, p
otassium 3.9, chloride 106, bicarb 22, BUN 26, creatinine 1.3, glucose 111. 



Urinalysis shows  red cells, 50 to 182 white cells, 3+ leukocyte esterase, trace bacteria, 2+ p
rotein.  Recent dig level on his last admission was 0.6.  He is influenza negative. 



Also recently EKG interpreted by me, shows atrial fibrillation at 86, frequent PACs, right bundle bra
nch block pattern.  This is unchanged from prior.  



Abdomen and pelvis ultrasound as reported in the HPI.  I have discussed the case Dr. Rubén Grimm.



ASSESSMENT/PLAN:  This is a 76-year-old gentleman presenting with hematuria and possible urinary trac
t infection.

1.  Clear urinary tract infection, his urinalyses are identical. There is no suprapubic tenderness.  
No fever.  No leukocytosis.  I think that this likely represents the sequela of an indwelling uretera
l stent.  He received ceftriaxone in the emergency department.  I will not continue his antibiotics. 
 Notably his last urinalysis in this setting grew out Staph epi, but multiple colony types.  Should t
he patient become febrile, etc., it would be reasonable to treat him with antibiotics.  For the time 
being we will hold them. 

2.  Hematuria.  This is likely secondary to systemic anticoagulation in the setting of indwelling lucy
dder catheter.  We will continue his rivaroxaban given his known left atrial appendage clot.  We will
 follow.  He has no signs of urinary obstruction for clot.  However, will be vigilant for that if he 
is unable to urinate.

3.  Dementia.  I believe the patient has some baseline dementia.  He is confabulatory in his speech r
elating stories of firemen trying to break into a neighbor's apartment, taking over his.  He related 
the same story to Dr. Grimm.  He does not have a depressed level of consciousness.  He has not disi
nhibited.  I have taken care of him on previous admissions, I suspect he has a level of dementia.

4.  Systolic heart failure.  Continue home medication regimen.  He is euvolemic at the moment.

5.  Disposition:  Observation status.  We will have OT see him given his level of cognitive dysfuncti
on.





Job #:  [id number]

## 2017-01-28 NOTE — CPEKG
Heart Rate: 86

RR Interval: 698

QRSD Interval: 126

QT Interval: 384

QTC Interval: 460

QRS Axis: -36

T Wave Axis: 159

EKG Severity - ABNORMAL ECG -

EKG Impression: ATRIAL FIBRILLATION, V-RATE 51-60

EKG Impression: MULTIFORM VENTRICULAR PREMATURE COMPLEXES

EKG Impression: NONSPECIFIC IVCD WITH LAD

EKG Impression: LVH WITH SECONDARY REPOLARIZATION ABNORMALITY

EKG Impression: Similar to previous

Electronically Signed By: Rubén Grimm 28-Jan-2017 15:55:16

## 2017-01-28 NOTE — EDPHY
H & P


Time Seen by Provider: 01/28/17 15:30


HPI/ROS: 


CHIEF COMPLAINT:  Hematuria





HISTORY OF PRESENT ILLNESS:  Patient is a 76-year-old male who comes to the 

emergency department complaining of hematuria.  He was supposed to go to [place] 

but when EMS arrived they noticed that he was in atrial fibrillation and 

decided to bring him here.  However it turns out that the atrial fibrillation 

is permanent and chronic and that he is currently on Plavix and Xarelto.  He 

denies chest pain or shortness of breath.  He denies having any dysuria or 

pelvic pain.  No fevers.  He did have a the left ureteral stent placed in 

November for kidney stones.  He follows with Dr. Lainez and Dr. Sophia Lafleru.

   He was admitted 2 weeks ago for heart failure and found to have a left 

atrial thrombus and a ejection fraction of 20%.  Because of this thrombus he 

was switched from Coumadin to Xarelto.  He had been noncompliant on the 

Coumadin.  He states that he called the nurse advice line to ask about the 

hematuria he had been experiencing for the last 2 days and they advised him to 

come into the [facility] to be seen.








REVIEW OF SYSTEMS:


Constitutional:  denies: chills, fever, recent illness, recent injury


EENTM: denies: blurred vision, double vision, nose congestion


Respiratory: denies: cough, shortness of breath


Cardiac: denies: chest pain, irregular heart rate, lightheadedness, palpitations


Gastrointestinal/Abdominal: denies: abdominal pain, diarrhea, nausea, vomiting, 

blood streaked stools


Genitourinary:  See HPI


Musculoskeletal: denies: joint pain, muscle pain


Skin: denies: lesions, rash, jaundice, bruising


Neurological: denies: headache, numbness, paresthesia, tingling, dizziness, 

weakness


Hematologic/Lymphatic: denies: blood clots, easy bleeding, easy bruising


Immunologic/allergic: denies: HIV/AIDS, transplant








EXAM:


GENERAL:  Well-appearing, well-nourished and in no acute distress.


HEAD:  Atraumatic, normocephalic.


EYES:  Pupils equal round and reactive to light, extraocular movements intact, 

sclera anicteric, conjunctiva are normal.


ENT:  TMs normal, nares patent, oropharynx clear without exudates.  Moist 

mucous membranes.


NECK:  Normal range of motion, supple without lymphadenopathy or JVD.


LUNGS:  Breath sounds clear to auscultation bilaterally and equal.  No wheezes 

rales or rhonchi.


HEART:  Regular rate and rhythm without murmurs, rubs or gallops.


ABDOMEN:  Soft, nontender, normoactive bowel sounds.  No guarding, no rebound.  

No masses appreciated.


BACK:  No CVA tenderness, no spinal tenderness, step-offs or deformities


EXTREMITIES:  Normal range of motion, no pitting or edema.  No clubbing or 

cyanosis.


NEUROLOGICAL:  Cranial nerves II through XII grossly intact.  Normal speech, 

normal gait.  5/5 strength, normal movement in all extremities, normal sensation


PSYCH:  Normal mood, normal affect.


SKIN:  Warm, dry, normal turgor, no visible rashes or lesions.








Source: Patient, EMS


Exam Limitations: No limitations





- Personal History


Tetanus Vaccine Date: 3/2010





- Medical/Surgical History


Hx Asthma: No


Hx Chronic Respiratory Disease: No


Hx Diabetes: No


Hx Cardiac Disease: Yes


Hx Renal Disease: Yes


Hx Cirrhosis: No


Hx Alcoholism: No


Hx HIV/AIDS: No


Hx Splenectomy or Spleen Trauma: No


Other PMH: 1. Chronic systolic heart failure the chest Verito fraction of 25%.  

2. Coronary artery disease status post stenting on 11/16/2016.  3. Chronic 

atrial fibrillation.  4. Hypertension.  5. Chronic renal insufficiency.  6. 

Nephrolithiasis with ureteral stent placement November of 2016.  7. Gout.  8. 

Right total knee arthroplasty.  9. Appendectomy





- Family History


Significant Family History: No pertinent family hx





- Social History


Smoking Status: Never smoked


Alcohol Use: Sober


Drug Use: None


Constitutional: 


 Initial Vital Signs











Temperature (C)  36.5 C   01/28/17 15:51


 


Heart Rate  81   01/28/17 15:51


 


Respiratory Rate  20   01/28/17 15:51


 


Blood Pressure  119/85 H  01/28/17 15:51


 


O2 Sat (%)  96   01/28/17 15:51








 











O2 Delivery Mode               Room Air














Allergies/Adverse Reactions: 


 





atorvastatin calcium [From Lipitor] Allergy (Severe, Verified 01/13/17 04:13)


 Other-Enter Comments








Home Medications: 














 Medication  Instructions  Recorded


 


Allopurinol [Allopurinol 300  mg PO DAILY 11/23/16





(RX)]  


 


Aspirin EC [Aspirin EC 81 mg (*)] 81 mg PO DAILY 11/23/16


 


Clopidogrel Bisulfate [Plavix (*)] 75 mg PO DAILY 11/23/16


 


Acetaminophen [Tylenol 325mg (*)] 325 mg PO Q4HRS PRN 01/19/17


 


Digoxin [Lanoxin 125 mcg (RX)] 125 mcg PO HS #30 tab 01/21/17


 


Furosemide [Lasix 40 MG (*)] 60 mg PO DAILY #45 tab 01/21/17


 


Spironolactone [Aldactone 25 MG 12.5 mg PO DAILY #30 tab 01/21/17





(*)]  


 


Losartan Potassium [Cozaar 25 mg 12.5 mg PO DAILY #30 tab 01/22/17





(*)]  


 


Metoprolol Succinate Xr [Toprol Xl 50 mg PO TIDMEAL 01/28/17





50 mg (*)]  


 


Rivaroxaban [Xarelto 15mg (*)] 15 mg PO DAILY@1800 01/28/17














Medical Decision Making





- Diagnostics


EKG Interpretation: 


An EKG obtained and was read and documented in trace view.  Please see trace 

view for full reading and report.  Atrial fibrillation, no acute ischemic 

changes, similar to previous 


ED Course/Re-evaluation: 


The patient is somewhat confused about his history.  Initially he told me that 

the stent was placed this week and that he spoke with Dr. Lainez about his 

hematuria.  On my 2nd interview the patient  Denies ever saying this.





5:45 p.m. I discussed the case with Dr. Sophia Lafleur.  He recommends 

hydration of the patient is safely as possible considering his CHF and minimal 

activity to try and help the bleeding to stop.  He was asking about the options 

for stopping Xarelto.  I will also discussed the case with Cardiology.





6:00 p.m. I discussed the case with Dr. Wray to agrees with the plan and 

definitely does not want to stop his Xarelto.  He will follow up with the 

patient early next week.  He will also follow up with Urology.





6:30 p.m. the patient has nitrates and white cells in his urine as well.  I 

will treat him for urinary tract infection.  This is concerning in the setting 

of a stent in his ureter.  On re-evaluation he does complain of mild left flank 

pain.  He is slightly tachycardic but this is in the setting of atrial 

fibrillation.  His blood pressures remained stable. I will admit him to the  

hospital and start him on IV antibiotics.  I spoke with Dr. Mo Blanco who 

agrees and have paged Dr. Lafleur again.





6:40 p.m. I discussed the case with Dr. Lafleur agrees with admission and will 

consult if needed.


Differential Diagnosis: 


Partial list of the Differential diagnosis considered include but were not 

limited to;  hematuria, anticoagulation, infection and although unlikely based 

on the history and physical exam, I also considered aneurysm, fistula, stone.  

I discussed these differential diagnoses and the plan with the patient as well 

as the usual and expected course.  The patient understands that the diagnosis 

is provisional and that in medicine we are not always correct and that further 

workup is often warranted.  Usual and customary warnings were given.  All of 

the patient's questions were answered.  The patient was instructed to return to 

the emergency department should the symptoms at all worsen or return, otherwise 

to followup with the physician as we discussed.





- Data Points


Laboratory Results: 


 Laboratory Results





 01/28/17 15:42 





 01/28/17 15:42 





 











  01/28/17 01/28/17





  17:58 15:42


 


WBC    7.58 10^3/uL





    (3.80-9.50) 


 


RBC    4.90 10^6/uL





    (4.40-6.38) 


 


Hgb    15.5 g/dL





    (13.7-17.5) 


 


Hct    45.2 %





    (40.0-51.0) 


 


MCV    92.2 fL





    (81.5-99.8) 


 


MCH    31.6 pg





    (27.9-34.1) 


 


MCHC    34.3 g/dL





    (32.4-36.7) 


 


RDW    13.9 %





    (11.5-15.2) 


 


Plt Count    205 10^3/uL





    (150-400) 


 


MPV    10.0 fL





    (8.7-11.7) 


 


Neut % (Auto)    66.2 %





    (39.3-74.2) 


 


Lymph % (Auto)    24.8 %





    (15.0-45.0) 


 


Mono % (Auto)    6.5 %





    (4.5-13.0) 


 


Eos % (Auto)    1.6 %





    (0.6-7.6) 


 


Baso % (Auto)    0.8 %





    (0.3-1.7) 


 


Nucleat RBC Rel Count    0.0 %





    (0.0-0.2) 


 


Absolute Neuts (auto)    5.02 10^3/uL





    (1.70-6.50) 


 


Absolute Lymphs (auto)    1.88 10^3/uL





    (1.00-3.00) 


 


Absolute Monos (auto)    0.49 10^3/uL





    (0.30-0.80) 


 


Absolute Eos (auto)    0.12 10^3/uL





    (0.03-0.40) 


 


Absolute Basos (auto)    0.06 10^3/uL





    (0.02-0.10) 


 


Absolute Nucleated RBC    0.00 10^3/uL





    (0-0.01) 


 


Immature Gran %    0.1 %





    (0.0-1.1) 


 


Immature Gran #    0.01 10^3/uL





    (0.00-0.10) 


 


PT    20.8 H SEC





    (12.0-15.0) 


 


INR    1.78 H 





    (0.83-1.16) 


 


APTT    44.8 H SEC





    (23.0-38.0) 


 


Sodium    140 mEq/L





    (134-144) 


 


Potassium    3.9 mEq/L





    (3.5-5.2) 


 


Chloride    106 mEq/L





    () 


 


Carbon Dioxide    22 mEq/l





    (22-31) 


 


Anion Gap    12 mEq/L





    (8-16) 


 


BUN    26 H mg/dL





    (7-23) 


 


Creatinine    1.3 mg/dL





    (0.7-1.3) 


 


Estimated GFR    54 





   


 


Glucose    111 H mg/dL





    () 


 


Calcium    9.0 mg/dL





    (8.5-10.4) 


 


Urine Color  RED   





   


 


Urine Appearance  MODERATELY TURBID   





   


 


Urine pH  6.0   





   (5.0-7.5)  


 


Ur Specific Gravity  1.011   





   (1.002-1.030)  


 


Urine Protein  2+ H   





   (NEGATIVE)  


 


Urine Ketones  NEGATIVE   





   (NEGATIVE)  


 


Urine Blood  3+ H   





   (NEGATIVE)  


 


Urine Nitrate  POSITIVE H   





   (NEGATIVE)  


 


Urine Bilirubin  NEGATIVE   





   (NEGATIVE)  


 


Urine Urobilinogen  NEGATIVE EU  





   (0.2-1.0)  


 


Ur Leukocyte Esterase  3+ H   





   (NEGATIVE)  


 


Urine RBC   H /hpf  





   (0-3)  


 


Urine WBC   H /hpf  





   (0-3)  


 


Ur Epithelial Cells  NONE SEEN /lpf  





   (NONE-1+)  


 


Urine Bacteria  TRACE H /hpf  





   (NONE SEEN)  


 


Ur Culture Indicated?  INDICATED H   





   (NI)  


 


Urine Glucose  NEGATIVE   





   (NEGATIVE)  


 


Digoxin    0.8 ng/mL





    (0.8-2.0) 











Medications Given: 


 








Discontinued Medications





Cephalexin HCl (Keflex)  500 mg PO EDNOW ONE


   PRN Reason: Protocol


   Stop: 01/28/17 18:16


   Last Admin: 01/28/17 20:01 Dose:  Not Given


Sodium Chloride (Ns)  1,000 mls @ 0 mls/hr IV ONCE ONE


   PRN Reason: Wide Open


   Stop: 01/28/17 15:40


   Last Admin: 01/28/17 15:56 Dose:  1,000 mls


Ceftriaxone Sodium/Dextrose (Rocephin 1 Gm (Premix))  50 mls @ 100 mls/hr IV 

EDNOW ONE


   PRN Reason: Protocol


   Stop: 01/28/17 18:55


   Last Admin: 01/28/17 18:41 Dose:  50 mls








Departure





- Departure


Disposition: Home, Routine, Self-Care


Clinical Impression: 


 Hematuria


Condition: Fair

## 2017-01-29 VITALS — RESPIRATION RATE: 16 BRPM | OXYGEN SATURATION: 96 % | TEMPERATURE: 98 F

## 2017-01-29 VITALS — SYSTOLIC BLOOD PRESSURE: 115 MMHG | HEART RATE: 74 BPM | DIASTOLIC BLOOD PRESSURE: 79 MMHG

## 2017-01-29 LAB
% IMMATURE GRANULYOCYTES: 0.3 % (ref 0–1.1)
ABSOLUTE IMMATURE GRANULOCYTES: 0.02 10^3/UL (ref 0–0.1)
ABSOLUTE NRBC COUNT: 0 10^3/UL (ref 0–0.01)
ADD DIFF?: NO
ADD MORPH?: NO
ADD SCAN?: NO
ANION GAP SERPL CALC-SCNC: 11 MEQ/L (ref 8–16)
ATYPICAL LYMPHOCYTE FLAG: 40 (ref 0–99)
CALCIUM SERPL-MCNC: 8.3 MG/DL (ref 8.5–10.4)
CHLORIDE SERPL-SCNC: 109 MEQ/L (ref 97–110)
CO2 SERPL-SCNC: 20 MEQ/L (ref 22–31)
CREAT SERPL-MCNC: 1.1 MG/DL (ref 0.7–1.3)
ERYTHROCYTE [DISTWIDTH] IN BLOOD BY AUTOMATED COUNT: 13.8 % (ref 11.5–15.2)
FRAGMENT RBC FLAG: 0 (ref 0–99)
GFR SERPL CREATININE-BSD FRML MDRD: > 60 ML/MIN/{1.73_M2}
GLUCOSE SERPL-MCNC: 104 MG/DL (ref 70–100)
HCT VFR BLD CALC: 41.2 % (ref 40–51)
HGB BLD-MCNC: 13.8 G/DL (ref 13.7–17.5)
INR PPP: 2.97 (ref 0.83–1.16)
LEFT SHIFT FLG: 10 (ref 0–99)
LIPEMIA HEMOLYSIS FLAG: 80 (ref 0–99)
MCH RBC BLDCO QN: 31.4 PG (ref 27.9–34.1)
MCHC RBC AUTO-ENTMCNC: 33.5 G/DL (ref 32.4–36.7)
MCV RBC AUTO: 93.8 FL (ref 81.5–99.8)
NRBC-AUTO%: 0 % (ref 0–0.2)
PLATELET # BLD: 168 10^3/UL (ref 150–400)
PLATELET CLUMPS FLAG: 10 (ref 0–99)
PMV BLD AUTO: 9.7 FL (ref 8.7–11.7)
POTASSIUM SERPL-SCNC: 3.6 MEQ/L (ref 3.5–5.2)
PROTHROMBIN TIME: 31.3 SEC (ref 12–15)
RBC # BLD AUTO: 4.39 10^6/UL (ref 4.4–6.38)
SODIUM SERPL-SCNC: 140 MEQ/L (ref 134–144)

## 2017-01-29 RX ADMIN — METOPROLOL SUCCINATE SCH MG: 50 TABLET, EXTENDED RELEASE ORAL at 12:45

## 2017-01-29 RX ADMIN — METOPROLOL SUCCINATE SCH MG: 50 TABLET, EXTENDED RELEASE ORAL at 08:43

## 2017-01-29 NOTE — PDDCSUM
Discharge Summary


Discharge Summary: 


Dates of service 1/28-1/29/17





Procedures/consultations: none





Hospital course by problem





# hematuria


# UTI


# left atrial clot


# CAD


# nephrolithiasis with indwelling stent


# persistent atrial fibrillation


# chronic systolic HF w/EF of 20%





Patient w/hx of a fib, left atrial clot, sysotlic HF on chronic AC presenting 

with hematuria and UTI





Hospital course: 


# hematuria: resolved, likely realted to ureteral stent. Will need to continue 

AC given comorbidities


# UTI: culture with >100k Staph aureus, s/s pending. Dc on keflex, will need to 

f/u s/s


# Left atrial clot/afib/CHF: continue current meds including AC





Dc home


f/u with urology and cardiology


>35 min spent in care of this patient more than half in coordination of care

## 2017-01-30 ENCOUNTER — HOSPITAL ENCOUNTER (EMERGENCY)
Dept: HOSPITAL 80 - FED | Age: 77
Discharge: HOME | End: 2017-01-30
Payer: COMMERCIAL

## 2017-01-30 VITALS
OXYGEN SATURATION: 96 % | RESPIRATION RATE: 18 BRPM | TEMPERATURE: 98.2 F | HEART RATE: 71 BPM | DIASTOLIC BLOOD PRESSURE: 72 MMHG | SYSTOLIC BLOOD PRESSURE: 108 MMHG

## 2017-01-30 DIAGNOSIS — I25.10: ICD-10-CM

## 2017-01-30 DIAGNOSIS — Z79.82: ICD-10-CM

## 2017-01-30 DIAGNOSIS — I12.9: ICD-10-CM

## 2017-01-30 DIAGNOSIS — R31.9: Primary | ICD-10-CM

## 2017-01-30 DIAGNOSIS — I48.91: ICD-10-CM

## 2017-01-30 DIAGNOSIS — I42.9: ICD-10-CM

## 2017-01-30 DIAGNOSIS — N18.9: ICD-10-CM

## 2017-01-30 NOTE — EDPHY
H & P


Time Seen by Provider: 01/30/17 09:12


HPI/ROS: 


CHIEF COMPLAINT:  Hematuria





HISTORY OF PRESENT ILLNESS:  The patient presents to the emergency department 

with complaints of hematuria.  The patient was discharged from the hospital 

yesterday after he was admitted for treatment of urinary tract infection and 

concurrent hematuria.  The patient is anticoagulated for history of chronic 

atrial fibrillation and cardiomyopathy with an ejection fraction of 20%.  The 

patient has a chronic left ureteral stent.  The patient was noted to have a 

Staph aureus UTI which was sensitive to Keflex during his last hospitalization.

  The patient reports that yesterday prior to discharge he did get catheterized 

so that he urine sample could be obtained. The patient reported his hematuria 

had resolved prior to being discharged yesterday but returned today.  The 

patient reports he is still urinating with normal frequency.  He denies fever 

or flank pain.





REVIEW OF SYSTEMS:


A comprehensive 10 point review of systems is otherwise negative aside from 

elements mentioned in the history of present illness.


Source: Patient, EMS


Exam Limitations: No limitations





- Personal History


Tetanus Vaccine Date: 3/2010





- Medical/Surgical History


Hx Asthma: No


Hx Chronic Respiratory Disease: No


Hx Diabetes: No


Hx Cardiac Disease: Yes


Hx Renal Disease: Yes


Hx Cirrhosis: No


Hx Alcoholism: No


Hx HIV/AIDS: No


Hx Splenectomy or Spleen Trauma: No


Other PMH: 1. Chronic systolic heart failure the chest Verito fraction of 25%.  

2. Coronary artery disease status post stenting on 11/16/2016.  3. Chronic 

atrial fibrillation.  4. Hypertension.  5. Chronic renal insufficiency.  6. 

Nephrolithiasis with ureteral stent placement November of 2016.  7. Gout.  8. 

Right total knee arthroplasty.  9. Appendectomy





- Social History


Smoking Status: Never smoked





- Physical Exam


Exam: 


General Appearance:  Alert, no distress


Eyes:  Pupils equal and round no pallor or injection


ENT, Mouth:  Mucous membranes moist


Respiratory:  There are no retractions, lungs are clear to auscultation


Cardiovascular:  Regular rate and rhythm


Gastrointestinal:  Abdomen is soft and nontender, no masses, bowel sounds normal


Genitourinary:  Blood noted at urethral meatus


Neurological:  5/5 strength all 4 extremities


Skin:  Warm and dry, no rashes


Musculoskeletal:  Neck is supple nontender


Extremities:  symmetrical, full range of motion








Constitutional: 


 Initial Vital Signs











Temperature (C)  36.7 C   01/30/17 09:16


 


Heart Rate  89   01/30/17 09:16


 


Respiratory Rate  20   01/30/17 09:16


 


Blood Pressure  135/75 H  01/30/17 09:16


 


O2 Sat (%)  94   01/30/17 09:16








 











O2 Delivery Mode               Room Air














Allergies/Adverse Reactions: 


 





atorvastatin calcium [From Lipitor] Allergy (Severe, Verified 01/30/17 09:16)


 Other-Enter Comments








Home Medications: 














 Medication  Instructions  Recorded


 


Allopurinol [Allopurinol 300  mg PO DAILY 11/23/16





(RX)]  


 


Aspirin EC [Aspirin EC 81 mg (*)] 81 mg PO DAILY 11/23/16


 


Clopidogrel Bisulfate [Plavix (*)] 75 mg PO DAILY 11/23/16


 


Acetaminophen [Tylenol 325mg (*)] 325 mg PO Q4HRS PRN 01/19/17


 


Digoxin [Lanoxin 125 mcg (RX)] 125 mcg PO HS #30 tab 01/21/17


 


Furosemide [Lasix 40 MG (*)] 60 mg PO DAILY #45 tab 01/21/17


 


Spironolactone [Aldactone 25 MG 12.5 mg PO DAILY #30 tab 01/21/17





(*)]  


 


Losartan Potassium [Cozaar 25 mg 12.5 mg PO DAILY #30 tab 01/22/17





(*)]  


 


Metoprolol Succinate Xr [Toprol Xl 50 mg PO TIDMEAL 01/28/17





50 mg (*)]  


 


Rivaroxaban [Xarelto 15mg (*)] 15 mg PO DAILY@1800 01/28/17


 


Acetaminophen [Tylenol 325mg (*)] 650 mg PO Q4HRS PRN #0 tab 01/29/17


 


Cephalexin [Keflex (*)] 500 mg PO BID #20 cap 01/29/17














Medical Decision Making


ED Course/Re-evaluation: 


The patient presents to the ED with ongoing hematuria in the setting of urinary 

tract infection and anticoagulant use.  It is fairly well documented the patient

's history of AFib and cardiomyopathy would warrant ongoing anticoagulation.  

The patient has no evidence of urinary retention.  His postvoid residual is 150 

mL.  The patient is currently being treated with Keflex for his UTI. 





I did speak with the patient's regular cardiologist Dr. Tip Massey who reports 

the patient can stop taking his Xarelto for the next several days.  He will see 

him in follow-up later this week.


Differential Diagnosis: 


Differential diagnosis considered includes urinary tract infection, urinary 

retention, excessive anticoagulation





Departure





- Departure


Disposition: Home, Routine, Self-Care


Clinical Impression: 


 Atrial fibrillation, Hematuria, Cardiomyopathy


Condition: Good


Instructions:  Hematuria (ED)


Additional Instructions: 


1.  Your cardiologist states that it is okay to stop Xarelto given your ongoing 

bloody urine.


2.  Please follow up with them as scheduled this week.


3.  Please work with your [place] primary care provider and schedule a follow-up 

with Urology.


Referrals: 


Tip Massey MD [Medical Doctor] - As per Instructions

## 2017-02-15 NOTE — ECHO
[id number]

[id number]



+---------------------------------------------------+      [address]

:                                                   :       [place] [address]

:                                                   :           [phone]

+---------------------------------------------------+       Fax [phone]

                  Transesophageal Echocardiographic Report

+---------------------------------------------------------------------------+

:Name: HAL GARCIA JULES       Study Date: 2017 12:43 PM               :

:MRN: [id number]              Hospital Admission Number: [id number]       :

:: 1940              Gender: Male                                  :

:Age: 76 yrs                  Race: WH,UN,U                                 :

:Reason For Study: Eval for clot                                            :

+---------------------------------------------------------------------------+





Atria

Echodensity on ridge entering LA appendage suggestive of thrombus.





Mitral Valve

The mitral valve is normal in structure and function.



Aortic Valve

The aortic valve is normal in structure and function.



Pericardium

There is no pericardial effusion.



Conclusion

A 2D transesophageal echocardiogram with color flow Doppler was performed.

Echodensity on ridge entering LA appendage suggestive of thrombus.

The aortic valve is normal in structure and function.





_____________________________________________________________________________





Final Reading Physician:

                        Rehana Cuellar signed on 02/15/2017 03:52 PM

Ordering Physician: Sherlyn Gilliland

Performed By: Vickey Perry MD

## 2017-04-19 ENCOUNTER — HOSPITAL ENCOUNTER (EMERGENCY)
Dept: HOSPITAL 80 - FED | Age: 77
Discharge: ANOTHER HOSPITAL | End: 2017-04-19
Payer: COMMERCIAL

## 2017-04-19 VITALS
RESPIRATION RATE: 16 BRPM | SYSTOLIC BLOOD PRESSURE: 111 MMHG | OXYGEN SATURATION: 94 % | DIASTOLIC BLOOD PRESSURE: 73 MMHG | HEART RATE: 61 BPM

## 2017-04-19 VITALS — TEMPERATURE: 97.9 F

## 2017-04-19 DIAGNOSIS — I10: ICD-10-CM

## 2017-04-19 DIAGNOSIS — Z79.01: ICD-10-CM

## 2017-04-19 DIAGNOSIS — I50.9: ICD-10-CM

## 2017-04-19 DIAGNOSIS — R07.9: Primary | ICD-10-CM

## 2017-04-19 DIAGNOSIS — Z95.5: ICD-10-CM

## 2017-04-19 DIAGNOSIS — R79.89: ICD-10-CM

## 2017-04-19 DIAGNOSIS — Z79.82: ICD-10-CM

## 2017-04-19 DIAGNOSIS — I25.10: ICD-10-CM

## 2017-04-19 LAB
% IMMATURE GRANULYOCYTES: 0.3 % (ref 0–1.1)
ABSOLUTE IMMATURE GRANULOCYTES: 0.02 10^3/UL (ref 0–0.1)
ABSOLUTE NRBC COUNT: 0 10^3/UL (ref 0–0.01)
ADD DIFF?: NO
ADD MORPH?: NO
ADD SCAN?: NO
ANION GAP SERPL CALC-SCNC: 11 MEQ/L (ref 8–16)
APTT BLD: 41.1 SEC (ref 23–38)
ATYPICAL LYMPHOCYTE FLAG: 10 (ref 0–99)
CALCIUM SERPL-MCNC: 9.1 MG/DL (ref 8.5–10.4)
CHLORIDE SERPL-SCNC: 105 MEQ/L (ref 97–110)
CO2 SERPL-SCNC: 25 MEQ/L (ref 22–31)
CREAT SERPL-MCNC: 1.4 MG/DL (ref 0.7–1.3)
ERYTHROCYTE [DISTWIDTH] IN BLOOD BY AUTOMATED COUNT: 14.6 % (ref 11.5–15.2)
FRAGMENT RBC FLAG: 0 (ref 0–99)
GFR SERPL CREATININE-BSD FRML MDRD: 49 ML/MIN/{1.73_M2}
GLUCOSE SERPL-MCNC: 117 MG/DL (ref 70–100)
HCT VFR BLD CALC: 48.2 % (ref 40–51)
HGB BLD-MCNC: 16.5 G/DL (ref 13.7–17.5)
INR PPP: 2.03 (ref 0.83–1.16)
LEFT SHIFT FLG: 0 (ref 0–99)
LIPEMIA HEMOLYSIS FLAG: 90 (ref 0–99)
MCH RBC BLDCO QN: 30.6 PG (ref 27.9–34.1)
MCHC RBC AUTO-ENTMCNC: 34.2 G/DL (ref 32.4–36.7)
MCV RBC AUTO: 89.3 FL (ref 81.5–99.8)
NRBC-AUTO%: 0 % (ref 0–0.2)
PLATELET # BLD: 208 10^3/UL (ref 150–400)
PLATELET CLUMPS FLAG: 0 (ref 0–99)
PMV BLD AUTO: 9.4 FL (ref 8.7–11.7)
POTASSIUM SERPL-SCNC: 3.5 MEQ/L (ref 3.5–5.2)
PROTHROMBIN TIME: 23.1 SEC (ref 12–15)
RBC # BLD AUTO: 5.4 10^6/UL (ref 4.4–6.38)
SODIUM SERPL-SCNC: 141 MEQ/L (ref 134–144)
TROPONIN I SERPL-MCNC: 0.06 NG/ML (ref 0–0.03)

## 2017-04-19 NOTE — EDPHY
HPI/HX/ROS/PE/MDM


Narrative: 





CHIEF COMPLAINT: Chest pain





HPI: The patient is a 76 year old male, brought in by EMS, who complains of 

chest pain that started 1.5 hours ago when he woke up.The patient is 

anticoagulated for history of chronic atrial fibrillation and cardiomyopathy 

with an ejection fraction of 20%. The patient had a ureteral stent placed Nov. 2016 and claims he had one placed last week as well because of nephrolithiasis. 

This morning her reports 8/10 chest pain when he woke up. He felt lightheaded 

and off balance. The patient has associated arm tingling and shortness of 

breath. He states it "feels like someone is sitting on my chest".  He received 

324mg Aspirin and 0.4mg Nitro during transport. His pain is now a 6/10.  Vitals 

in the field were BP of 100/74 and HR of 86. History is difficult to obtain 

from the patient. Apparently the patient was en route to [facility] by ambulance 

when he demanded to go to the nearest hospital, which is [facility].





REVIEW OF SYSTEMS:


Aside from elements discussed in the HPI, a comprehensive 10-point review of 

systems was reviewed and is negative.





PMH: 1. Chronic systolic heart failure. 2. CAD s/p stent placement 11/16/16. 3. 

Chronic atrial fibrillation. 4. Hypertension. 5. Chronic renal insufficiency. 6 

Nephrolithiasis with ureteral stent placed Nov. 2016. 7. Gout. 8. Right total 

knee arthroplasty. 9. Appendectomy.





SOCIAL HISTORY: Nonsmoker. No alcohol. Lives alone. Cuellar patient. 





PHYSICAL EXAM:


General: Patient is alert, in no acute distress.


ENT: Eyes are normal to inspection.  ENT inspection normal.


Neck:  Normal inspection.  Full range of motion.


Respiratory: No respiratory distress.  Breath sounds normal bilaterally.


Cardiovascular: Irregularly irregular.  Strong peripheral pulses.


Abdomen: The abdomen is nontender to palpation. There are no peritoneal signs. 

There are normal bowel sounds.


Back: Normal to inspection.  No tenderness to palpation.


Skin: Normal color.  No rash.  Warm and dry.


Extremities: Normal appearance.  Full range of motion.


Neuro:  Normal motor function.  Normal sensory function.


ED Course: 





The patient is a 76 year old male who complains of 8/10 chest pain. The patient 

is anticoagulated for history of chronic atrial fibrillation and cardiomyopathy 

with an ejection fraction of 20%. The patient had a ureteral stent placed in 

Nov. 2016 because of nephrolithiasis. I reviewed the patient's past medical 

records from last admission here 11/23/16 and last ED visit 1/30/17. The patient

's cardiologist is Tip Massey at [facility]. EMS was en route there, but 

patient complained his chest pain was getting worse so EMS diverted here. Plan 

for cardiac workup including chest x-ray, EKG, labs, and troponin. 





Chemistry is normal. INR is therapeutic. Patient has slightly elevated troponin 

of 0.057 this is normal for him. Creatinine is elevated at 1.4, this is normal 

for the patient. 





EKG was ordered and interpreted by myself.  Please see [employer] system for 

official reading. No change from prior. 





7:30 a.m.: I spoke to [facility]. They will have a doctor call back in regards to 

transferring the patient to [facility]. 





8:00 a.m.: I called [facility] again, they said the patient did not get a ureteral 

stent last week as patient claimed. They will call back with a decision about 

transfer. 





8:20 a.m.: I spoke to Dr. Adames who said the patient will be admitted to Dr. Richard.





An x-ray of chest was obtained. I viewed the images myself on the PACS system.  

See the full radiology report in the imaging section. 





MDM: 





This patient presents with chest pain in the setting of cardiac history and 

mildly elevated troponin.  His ECG does not show signs of acute ischemia.  The 

patient has been noted to be mildly demented in the past, and clearly has some 

trouble determining what events occured at which hospital.  The patient tells 

me he had several stents placed last week at [facility], but they nor us have 

record of this.  The patient is now demanding to be transferred to [facility], 

who has accepted him.  I think he is safe for transfer. 





- Data Points


Imaging Results: 


 Imaging Impressions





Chest X-Ray  04/19/17 06:57


Impression:


1. Stable mild cardiomegaly.


2. No active cardiopulmonary disease seen.


3. Smooth oval nodule right upper lobe and left base that may have developed. 


 


Findings discussed with Erik Hansen MD  at  8:54 hour, 4/19/2017.











Laboratory Results: 


 Laboratory Results





 04/19/17 06:45 





 04/19/17 06:45 





 











  04/19/17 04/19/17 04/19/17





  06:45 06:45 06:45


 


WBC      6.45 10^3/uL 10^3/uL





     (3.80-9.50) 


 


RBC      5.40 10^6/uL 10^6/uL





     (4.40-6.38) 


 


Hgb      16.5 g/dL g/dL





     (13.7-17.5) 


 


Hct      48.2 % %





     (40.0-51.0) 


 


MCV      89.3 fL fL





     (81.5-99.8) 


 


MCH      30.6 pg pg





     (27.9-34.1) 


 


MCHC      34.2 g/dL g/dL





     (32.4-36.7) 


 


RDW      14.6 % %





     (11.5-15.2) 


 


Plt Count      208 10^3/uL 10^3/uL





     (150-400) 


 


MPV      9.4 fL fL





     (8.7-11.7) 


 


Neut % (Auto)      57.8 % %





     (39.3-74.2) 


 


Lymph % (Auto)      33.8 % %





     (15.0-45.0) 


 


Mono % (Auto)      5.6 % %





     (4.5-13.0) 


 


Eos % (Auto)      1.6 % %





     (0.6-7.6) 


 


Baso % (Auto)      0.9 % %





     (0.3-1.7) 


 


Nucleat RBC Rel Count      0.0 % %





     (0.0-0.2) 


 


Absolute Neuts (auto)      3.73 10^3/uL 10^3/uL





     (1.70-6.50) 


 


Absolute Lymphs (auto)      2.18 10^3/uL 10^3/uL





     (1.00-3.00) 


 


Absolute Monos (auto)      0.36 10^3/uL 10^3/uL





     (0.30-0.80) 


 


Absolute Eos (auto)      0.10 10^3/uL 10^3/uL





     (0.03-0.40) 


 


Absolute Basos (auto)      0.06 10^3/uL 10^3/uL





     (0.02-0.10) 


 


Absolute Nucleated RBC      0.00 10^3/uL 10^3/uL





     (0-0.01) 


 


Immature Gran %      0.3 % %





     (0.0-1.1) 


 


Immature Gran #      0.02 10^3/uL 10^3/uL





     (0.00-0.10) 


 


PT    23.1 SEC H SEC  





    (12.0-15.0)  


 


INR    2.03  H   





    (0.83-1.16)  


 


APTT    41.1 SEC H SEC  





    (23.0-38.0)  


 


Sodium  141 mEq/L mEq/L    





   (134-144)   


 


Potassium  3.5 mEq/L mEq/L    





   (3.5-5.2)   


 


Chloride  105 mEq/L mEq/L    





   ()   


 


Carbon Dioxide  25 mEq/l mEq/l    





   (22-31)   


 


Anion Gap  11 mEq/L mEq/L    





   (8-16)   


 


BUN  27 mg/dL H mg/dL    





   (7-23)   


 


Creatinine  1.4 mg/dL H mg/dL    





   (0.7-1.3)   


 


Estimated GFR  49     





    


 


Glucose  117 mg/dL H mg/dL    





   ()   


 


Calcium  9.1 mg/dL mg/dL    





   (8.5-10.4)   


 


Troponin I  0.057 ng/mL H ng/mL    





   (0-0.034)   


 


NT-Pro-B Natriuret Pep  5470 pg/mL H pg/mL    





   (0-450)   














General


Time Seen by Provider: 04/19/17 06:54


Initial Vital Signs: 


 Initial Vital Signs











Temperature (C)  36.4 C   04/19/17 06:59


 


Heart Rate  63   04/19/17 06:59


 


Respiratory Rate  20   04/19/17 06:59


 


Blood Pressure  122/84 H  04/19/17 06:59


 


O2 Sat (%)  97   04/19/17 06:59








 











O2 Delivery Mode               Room Air














Allergies/Adverse Reactions: 


 





atorvastatin calcium [From Lipitor] Allergy (Severe, Verified 04/19/17 06:58)


 Other-Enter Comments


haloperidol [From Haldol] Allergy (Verified 04/19/17 06:58)


 








Home Medications: 














 Medication  Instructions  Recorded


 


Allopurinol [Allopurinol 300  mg PO DAILY 11/23/16





(RX)]  


 


Aspirin EC [Aspirin EC 81 mg (*)] 81 mg PO DAILY 11/23/16


 


Clopidogrel Bisulfate [Plavix (*)] 75 mg PO DAILY 11/23/16


 


Acetaminophen [Tylenol 325mg (*)] 325 mg PO Q4HRS PRN 01/19/17


 


Digoxin [Lanoxin 125 mcg (RX)] 125 mcg PO HS #30 tab 01/21/17


 


Furosemide [Lasix 40 MG (*)] 60 mg PO DAILY #45 tab 01/21/17


 


Spironolactone [Aldactone 25 MG 12.5 mg PO DAILY #30 tab 01/21/17





(*)]  


 


Losartan Potassium [Cozaar 25 mg 12.5 mg PO DAILY #30 tab 01/22/17





(*)]  


 


Metoprolol Succinate Xr [Toprol Xl 50 mg PO TIDMEAL 01/28/17





50 mg (*)]  


 


Rivaroxaban [Xarelto 15mg (*)] 15 mg PO DAILY@1800 01/28/17


 


Acetaminophen [Tylenol 325mg (*)] 650 mg PO Q4HRS PRN #0 tab 01/29/17


 


Cephalexin [Keflex (*)] 500 mg PO BID #20 cap 01/29/17














Departure





- Departure


Disposition: [facility]


Clinical Impression: 


 Elevated troponin





Chest pain


Qualifiers:


 Chest pain type: unspecified Qualified Code(s): R07.9 - Chest pain, unspecified





Condition: Good


Referrals: 


[facility] [Other] - As per Instructions


Report Scribed for: Erik Hansen


Report Scribed by: Alexandra Gundersen


Date of Report: 04/19/17


Time of Report: 07:49

## 2017-04-19 NOTE — CPEKG
Heart Rate: 80

RR Interval: 750

QRSD Interval: 170

QT Interval: 468

QTC Interval: 540

QRS Axis: -50

T Wave Axis: 22

EKG Severity - ABNORMAL ECG -

EKG Impression: ATRIAL FIBRILLATION, V-RATE 

EKG Impression: VENTRICULAR PREMATURE COMPLEX

EKG Impression: RBBB AND LPFB

Electronically Signed By: Judy Knapp 19-Apr-2017 18:58:22

## 2017-04-19 NOTE — EDPHY
H & P


HPI/ROS: 





CHIEF COMPLAINT: Chest pain





HPI: The patient is a 76 year old male, brought in by EMS, who complains of 

chest pain that started 1.5 hours ago when he woke up.The patient is 

anticoagulated for history of chronic atrial fibrillation and cardiomyopathy 

with an ejection fraction of 20%. The patient had a ureteral stent placed last 

week because of nephrolithiasis. This morning her reports 8/10 chest pain when 

he woke up. He felt lightheaded and off balance. The patient has associated arm 

tingling and shortness of breath. He states it "feels like someone is sitting 

on my chest".  He received 324mg Aspirin and 0.4mg Nitro during transport. His 

pain is now a 6/10.  Vitals in the field were BP of 100/74 and HR of 86. 





REVIEW OF SYSTEMS:


Aside from elements discussed in the HPI, a comprehensive 10-point review of 

systems was reviewed and is negative.





PMH: 1. Chronic systolic heart failure. 2. CAD s/p stent placement 11/16/16. 3. 

Chronic atrial fibrillation. 4. Hypertension. 5. Chronic renal insufficiency. 6 

Nephrolithiasis with ureteral stent placed Nov. 2016. 7. Gout. 8. Right total 

knee arthroplasty. 9. Appendectomy.





SOCIAL HISTORY: Nonsmoker. No alcohol. Lives alone. Cuellar patient. 





PHYSICAL EXAM:


General: Patient is alert, in no acute distress.


ENT: Eyes are normal to inspection.  ENT inspection normal.


Neck:  Normal inspection.  Full range of motion.


Respiratory: No respiratory distress.  Breath sounds normal bilaterally.


Cardiovascular: Irregularly irregular.  Strong peripheral pulses.


Abdomen: The abdomen is nontender to palpation. There are no peritoneal signs. 

There are normal bowel sounds.


Back: Normal to inspection.  No tenderness to palpation.


Skin: Normal color.  No rash.  Warm and dry.


Extremities: Normal appearance.  Full range of motion.


Neuro: Oriented x3.  Normal motor function.  Normal sensory function.


Source: Patient, EMS





- Personal History


Tetanus Vaccine Date: 3/2010





- Medical/Surgical History


Hx Asthma: No


Hx Chronic Respiratory Disease: No


Hx Diabetes: No


Hx Cardiac Disease: Yes


Hx Renal Disease: Yes


Hx Cirrhosis: No


Hx Alcoholism: No


Hx HIV/AIDS: No


Hx Splenectomy or Spleen Trauma: No


Other PMH: 1. Chronic systolic heart failure the chest Vreito fraction of 25%.  

2. Coronary artery disease status post stenting on 11/16/2016.  3. Chronic 

atrial fibrillation.  4. Hypertension.  5. Chronic renal insufficiency.  6. 

Nephrolithiasis with ureteral stent placement November of 2016.  7. Gout.  8. 

Right total knee arthroplasty.  9. Appendectomy





- Social History


Smoking Status: Never smoked


Constitutional: 


 Initial Vital Signs











Temperature (C)  36.4 C   04/19/17 06:59


 


Heart Rate  63   04/19/17 06:59


 


Respiratory Rate  20   04/19/17 06:59


 


Blood Pressure  122/84 H  04/19/17 06:59


 


O2 Sat (%)  97   04/19/17 06:59








 











O2 Delivery Mode               Room Air














Allergies/Adverse Reactions: 


 





atorvastatin calcium [From Lipitor] Allergy (Severe, Verified 04/19/17 06:58)


 Other-Enter Comments


haloperidol [From Haldol] Allergy (Verified 04/19/17 06:58)


 








Home Medications: 














 Medication  Instructions  Recorded


 


Allopurinol [Allopurinol 300  mg PO DAILY 11/23/16





(RX)]  


 


Aspirin EC [Aspirin EC 81 mg (*)] 81 mg PO DAILY 11/23/16


 


Clopidogrel Bisulfate [Plavix (*)] 75 mg PO DAILY 11/23/16


 


Acetaminophen [Tylenol 325mg (*)] 325 mg PO Q4HRS PRN 01/19/17


 


Digoxin [Lanoxin 125 mcg (RX)] 125 mcg PO HS #30 tab 01/21/17


 


Furosemide [Lasix 40 MG (*)] 60 mg PO DAILY #45 tab 01/21/17


 


Spironolactone [Aldactone 25 MG 12.5 mg PO DAILY #30 tab 01/21/17





(*)]  


 


Losartan Potassium [Cozaar 25 mg 12.5 mg PO DAILY #30 tab 01/22/17





(*)]  


 


Metoprolol Succinate Xr [Toprol Xl 50 mg PO TIDMEAL 01/28/17





50 mg (*)]  


 


Rivaroxaban [Xarelto 15mg (*)] 15 mg PO DAILY@1800 01/28/17


 


Acetaminophen [Tylenol 325mg (*)] 650 mg PO Q4HRS PRN #0 tab 01/29/17


 


Cephalexin [Keflex (*)] 500 mg PO BID #20 cap 01/29/17














Medical Decision Making


ED Course/Re-evaluation: 


The patient is a 76 year old male who complains of 8/10 chest pain. The patient 

is anticoagulated for history of chronic atrial fibrillation and cardiomyopathy 

with an ejection fraction of 20%. The patient had a ureteral stent placed last 

week because of nephrolithiasis. I reviewed the patient's past medical records 

from last admission here 11/23/16 and last ED visit 1/30/17. The patient's 

cardiologist is Tip Massey at [facility] [place]. EMS was en route there, but 

patient complained his chest pain was getting worse so EMS diverted here. Plan 

for cardiac workup including chest x-ray, EKG, labs, and troponin. 





Chemistry is normal. INR is therapeutic. Patient has an elevated troponin of 

0.057. Creatinine is elevated at 1.4, this is normal for the patient. 





7:30 a.m.: I spoke to [place]. The patient will be transferred to [facility]. 





- Data Points


Laboratory Results: 


 Laboratory Results





 04/19/17 06:45 





 04/19/17 06:45 





 











  04/19/17 04/19/17 04/19/17





  06:45 06:45 06:45


 


WBC      6.45 10^3/uL 10^3/uL





     (3.80-9.50) 


 


RBC      5.40 10^6/uL 10^6/uL





     (4.40-6.38) 


 


Hgb      16.5 g/dL g/dL





     (13.7-17.5) 


 


Hct      48.2 % %





     (40.0-51.0) 


 


MCV      89.3 fL fL





     (81.5-99.8) 


 


MCH      30.6 pg pg





     (27.9-34.1) 


 


MCHC      34.2 g/dL g/dL





     (32.4-36.7) 


 


RDW      14.6 % %





     (11.5-15.2) 


 


Plt Count      208 10^3/uL 10^3/uL





     (150-400) 


 


MPV      9.4 fL fL





     (8.7-11.7) 


 


Neut % (Auto)      57.8 % %





     (39.3-74.2) 


 


Lymph % (Auto)      33.8 % %





     (15.0-45.0) 


 


Mono % (Auto)      5.6 % %





     (4.5-13.0) 


 


Eos % (Auto)      1.6 % %





     (0.6-7.6) 


 


Baso % (Auto)      0.9 % %





     (0.3-1.7) 


 


Nucleat RBC Rel Count      0.0 % %





     (0.0-0.2) 


 


Absolute Neuts (auto)      3.73 10^3/uL 10^3/uL





     (1.70-6.50) 


 


Absolute Lymphs (auto)      2.18 10^3/uL 10^3/uL





     (1.00-3.00) 


 


Absolute Monos (auto)      0.36 10^3/uL 10^3/uL





     (0.30-0.80) 


 


Absolute Eos (auto)      0.10 10^3/uL 10^3/uL





     (0.03-0.40) 


 


Absolute Basos (auto)      0.06 10^3/uL 10^3/uL





     (0.02-0.10) 


 


Absolute Nucleated RBC      0.00 10^3/uL 10^3/uL





     (0-0.01) 


 


Immature Gran %      0.3 % %





     (0.0-1.1) 


 


Immature Gran #      0.02 10^3/uL 10^3/uL





     (0.00-0.10) 


 


PT    23.1 SEC H SEC  





    (12.0-15.0)  


 


INR    2.03  H   





    (0.83-1.16)  


 


APTT    41.1 SEC H SEC  





    (23.0-38.0)  


 


Sodium  141 mEq/L mEq/L    





   (134-144)   


 


Potassium  3.5 mEq/L mEq/L    





   (3.5-5.2)   


 


Chloride  105 mEq/L mEq/L    





   ()   


 


Carbon Dioxide  25 mEq/l mEq/l    





   (22-31)   


 


Anion Gap  11 mEq/L mEq/L    





   (8-16)   


 


BUN  27 mg/dL H mg/dL    





   (7-23)   


 


Creatinine  1.4 mg/dL H mg/dL    





   (0.7-1.3)   


 


Estimated GFR  49     





    


 


Glucose  117 mg/dL H mg/dL    





   ()   


 


Calcium  9.1 mg/dL mg/dL    





   (8.5-10.4)   


 


Troponin I  0.057 ng/mL H ng/mL    





   (0-0.034)   


 


NT-Pro-B Natriuret Pep  5470 pg/mL H pg/mL    





   (0-450)   














Departure





- Departure


Referrals: 


Patient,NotPresent [Unknown] - As per Instructions


Report Scribed for: Erik Hansen


Report Scribed by: Alexandra Gundersen


Date of Report: 04/19/17


Time of Report: 07:09

## 2017-04-25 ENCOUNTER — HOSPITAL ENCOUNTER (EMERGENCY)
Dept: HOSPITAL 80 - FED | Age: 77
Discharge: HOME | End: 2017-04-25
Payer: COMMERCIAL

## 2017-04-25 VITALS
RESPIRATION RATE: 16 BRPM | DIASTOLIC BLOOD PRESSURE: 72 MMHG | OXYGEN SATURATION: 96 % | TEMPERATURE: 97.5 F | HEART RATE: 75 BPM | SYSTOLIC BLOOD PRESSURE: 121 MMHG

## 2017-04-25 DIAGNOSIS — Z79.01: ICD-10-CM

## 2017-04-25 DIAGNOSIS — I25.10: ICD-10-CM

## 2017-04-25 DIAGNOSIS — Z79.82: ICD-10-CM

## 2017-04-25 DIAGNOSIS — I10: ICD-10-CM

## 2017-04-25 DIAGNOSIS — I50.9: ICD-10-CM

## 2017-04-25 DIAGNOSIS — I48.2: Primary | ICD-10-CM

## 2017-04-25 DIAGNOSIS — Z95.5: ICD-10-CM

## 2017-04-25 LAB
% IMMATURE GRANULYOCYTES: 0.3 % (ref 0–1.1)
ABSOLUTE IMMATURE GRANULOCYTES: 0.02 10^3/UL (ref 0–0.1)
ABSOLUTE NRBC COUNT: 0 10^3/UL (ref 0–0.01)
ADD DIFF?: NO
ADD MORPH?: NO
ADD SCAN?: NO
ANION GAP SERPL CALC-SCNC: 16 MEQ/L (ref 8–16)
APTT BLD: 40.5 SEC (ref 23–38)
ATYPICAL LYMPHOCYTE FLAG: 0 (ref 0–99)
CALCIUM SERPL-MCNC: 9.9 MG/DL (ref 8.5–10.4)
CHLORIDE SERPL-SCNC: 105 MEQ/L (ref 97–110)
CO2 SERPL-SCNC: 22 MEQ/L (ref 22–31)
CREAT SERPL-MCNC: 1.3 MG/DL (ref 0.7–1.3)
ERYTHROCYTE [DISTWIDTH] IN BLOOD BY AUTOMATED COUNT: 15 % (ref 11.5–15.2)
FRAGMENT RBC FLAG: 0 (ref 0–99)
GFR SERPL CREATININE-BSD FRML MDRD: 54 ML/MIN/{1.73_M2}
GLUCOSE SERPL-MCNC: 132 MG/DL (ref 70–100)
HCT VFR BLD CALC: 48.6 % (ref 40–51)
HGB BLD-MCNC: 16.4 G/DL (ref 13.7–17.5)
INR PPP: 1.97 (ref 0.83–1.16)
LEFT SHIFT FLG: 0 (ref 0–99)
LIPEMIA HEMOLYSIS FLAG: 80 (ref 0–99)
MCH RBC BLDCO QN: 31.2 PG (ref 27.9–34.1)
MCHC RBC AUTO-ENTMCNC: 33.7 G/DL (ref 32.4–36.7)
MCV RBC AUTO: 92.4 FL (ref 81.5–99.8)
NRBC-AUTO%: 0 % (ref 0–0.2)
PLATELET # BLD: 187 10^3/UL (ref 150–400)
PLATELET CLUMPS FLAG: 0 (ref 0–99)
PMV BLD AUTO: 10.1 FL (ref 8.7–11.7)
POTASSIUM SERPL-SCNC: 3.6 MEQ/L (ref 3.5–5.2)
PROTHROMBIN TIME: 22.5 SEC (ref 12–15)
RBC # BLD AUTO: 5.26 10^6/UL (ref 4.4–6.38)
SODIUM SERPL-SCNC: 143 MEQ/L (ref 134–144)

## 2017-04-25 NOTE — CPEKG
Heart Rate: 89

RR Interval: 674

QRSD Interval: 170

QT Interval: 428

QTC Interval: 521

QRS Axis: 80

T Wave Axis: 11

EKG Severity - ABNORMAL ECG -

EKG Impression: ATRIAL FIBRILLATION, V-RATE 

EKG Impression: RBBB AND LPFB

Electronically Signed By: Darinel Kirby 25-Apr-2017 15:49:52

## 2017-04-25 NOTE — EDPHY
H & P


Time Seen by Provider: 04/25/17 13:48


HPI/ROS: 





Chief complaint.  Chest pain





HPI.  76-year-old male here by EMS after being discharged earlier today from 

[facility].  He went to [facility] this morning after developing 

chest discomfort shortness of breath at 4:00 a.m..  He has had similar symptoms 

off and on for the past 3 months.  He describes a pressure sensation across his 

chest without radiation.  Associated shortness of breath.  However his pain is 

not worse with breathing, exertion or movement.  In the emergency department he 

was apparently ruled out at [facility].  They did diagnose a infection in his 

kidney the patient says.  He was prescribed antibiotics and has taken 1 dose.





ROS


Constitutional.  no fever/chills, no weakness


Eyes.  no problems with vision


ENT.  no sore throat, no nasal drainage


Cardiovascular.  Chest pain


Respiratory.  shortness of breath


Abdominal.  no abdominal pain, no nausea/vomiting, no diarrhea


.  Urinary frequency


MS.  no calf pain/swelling, no neck/back pain, no joint pain


Skin.  no rash


Lymph.  no swollen glands


Neuro.  no headache, no dizziness, no difficulty walking or with speech


Past Medical/Surgical History: 





Past fall history significant for chronic systolic heart failure, coronary 

artery disease with stent, chronic atrial fibrillation on anticoagulation, 

hypertension, chronic renal insufficiency, kidney stones with ureteral stent, 

gout, knee replacement, appendectomy


Social History: 





Single, nonsmoker, no alcohol


Smoking Status: Never smoked


Physical Exam: 





General Appearance:  Alert well-developed male mild distress vital signs are 

stable


Eyes: Pupils equal and round no pallor or injection.


ENT, Mouth:  Mucous membranes are moist.


Respiratory:  There are no retractions, lungs are clear to auscultation.


Cardiovascular: Regular rate and rhythm.


Gastrointestinal:   Abdomen is soft and nontender, no masses, bowel sounds 

normal.


Neurological:  Awake and alert, sensory and motor exams grossly normal.


Skin: Warm and dry, no rashes.


Musculoskeletal:  Neck is supple nontender.


Extremities  symmetrical, full range of motion.


Psychiatric: Patient is oriented X 3, there is no agitation.


Constitutional: 


 Initial Vital Signs











Temperature (C)  36.6 C   04/25/17 14:00


 


Heart Rate  80   04/25/17 14:00


 


Respiratory Rate  18   04/25/17 14:00


 


Blood Pressure  107/81 H  04/25/17 14:00


 


O2 Sat (%)  97   04/25/17 14:00








 











O2 Delivery Mode               Room Air














Allergies/Adverse Reactions: 


 





atorvastatin calcium [From Lipitor] Allergy (Severe, Verified 04/19/17 06:58)


 Other-Enter Comments


haloperidol [From Haldol] Allergy (Verified 04/19/17 06:58)


 








Home Medications: 














 Medication  Instructions  Recorded


 


Allopurinol [Allopurinol 300  mg PO DAILY 11/23/16





(RX)]  


 


Aspirin EC [Aspirin EC 81 mg (*)] 81 mg PO DAILY 11/23/16


 


Clopidogrel Bisulfate [Plavix (*)] 75 mg PO DAILY 11/23/16


 


Acetaminophen [Tylenol 325mg (*)] 325 mg PO Q4HRS PRN 01/19/17


 


Digoxin [Lanoxin 125 mcg (RX)] 125 mcg PO HS #30 tab 01/21/17


 


Furosemide [Lasix 40 MG (*)] 60 mg PO DAILY #45 tab 01/21/17


 


Spironolactone [Aldactone 25 MG 12.5 mg PO DAILY #30 tab 01/21/17





(*)]  


 


Losartan Potassium [Cozaar 25 mg 12.5 mg PO DAILY #30 tab 01/22/17





(*)]  


 


Metoprolol Succinate Xr [Toprol Xl 50 mg PO TIDMEAL 01/28/17





50 mg (*)]  


 


Rivaroxaban [Xarelto 15mg (*)] 15 mg PO DAILY@1800 01/28/17


 


Acetaminophen [Tylenol 325mg (*)] 650 mg PO Q4HRS PRN #0 tab 01/29/17


 


Cephalexin [Keflex (*)] 500 mg PO BID #20 cap 01/29/17














Medical Decision Making





- Diagnostics


Imaging Results: 


 Imaging Impressions





Chest X-Ray  04/25/17 14:50


Impression:


1. Cardiomegaly and mild pulmonary venous hypertension.


2. No focal pneumonia.











Procedures: 





IV normal saline, monitor


ED Course/Re-evaluation: 





3:25 p.m. the patient on re-evaluation tells me he wants to leave.  The patient 

and I discussed existing lab EKG and imaging studies that we have done.  We 

discussed treatment plan.  I had already told the patient that I would like to 

speak to [facility] and would happy to work on admission for him there.  I have 

reiterated this discussion however he refuses and he says I am going to go home 

want to go home.  We discussed risks and benefits of this including going home, 

following up with his regular physician at [facility], being admitted for 

continuing chest discomfort and shortness of breath. He tells me he will follow 

up with his regular physician at [facility] but he does not wish to be admitted he 

does not wish further workup and he wants to be discharged.  Patient appears 

competent to make decision and have a discussion about risks and benefits of 

this decision


Differential Diagnosis: 





Chronic chest discomfort and shortness of breath. No acute findings at this 

point.  The patient is in chronic atrial fibrillation.  His chest x-ray shows 

no pneumonia does show pulmonary hypertension.  He was diagnosed with urinary 

tract infection at [facility] earlier today and prescribed an 

antibiotic which he has had his 1st dose after filling it at the pharmacy.  I 

considered acute coronary syndrome, pulmonary embolus, congestive heart failure





- Data Points


Laboratory Results: 


 Laboratory Results





 04/25/17 14:00 





 04/25/17 14:00 





 











  04/25/17 04/25/17 04/25/17





  14:41 14:41 14:00


 


WBC      





    


 


RBC      





    


 


Hgb      





    


 


Hct      





    


 


MCV      





    


 


MCH      





    


 


MCHC      





    


 


RDW      





    


 


Plt Count      





    


 


MPV      





    


 


Neut % (Auto)      





    


 


Lymph % (Auto)      





    


 


Mono % (Auto)      





    


 


Eos % (Auto)      





    


 


Baso % (Auto)      





    


 


Nucleat RBC Rel Count      





    


 


Absolute Neuts (auto)      





    


 


Absolute Lymphs (auto)      





    


 


Absolute Monos (auto)      





    


 


Absolute Eos (auto)      





    


 


Absolute Basos (auto)      





    


 


Absolute Nucleated RBC      





    


 


Immature Gran %      





    


 


Immature Gran #      





    


 


PT    22.5 SEC H SEC  





    (12.0-15.0)  


 


INR    1.97  H   





    (0.83-1.16)  


 


APTT    40.5 SEC H SEC  





    (23.0-38.0)  


 


Sodium      143 mEq/L mEq/L





     (134-144) 


 


Potassium      3.6 mEq/L mEq/L





     (3.5-5.2) 


 


Chloride      105 mEq/L mEq/L





     () 


 


Carbon Dioxide      22 mEq/l mEq/l





     (22-31) 


 


Anion Gap      16 mEq/L mEq/L





     (8-16) 


 


BUN      23 mg/dL mg/dL





     (7-23) 


 


Creatinine      1.3 mg/dL mg/dL





     (0.7-1.3) 


 


Estimated GFR      54 





    


 


Glucose      132 mg/dL H mg/dL





     () 


 


Calcium      9.9 mg/dL mg/dL





     (8.5-10.4) 


 


Troponin I  Pending     





    














  04/25/17





  14:00


 


WBC  7.98 10^3/uL 10^3/uL





   (3.80-9.50) 


 


RBC  5.26 10^6/uL 10^6/uL





   (4.40-6.38) 


 


Hgb  16.4 g/dL g/dL





   (13.7-17.5) 


 


Hct  48.6 % %





   (40.0-51.0) 


 


MCV  92.4 fL fL





   (81.5-99.8) 


 


MCH  31.2 pg pg





   (27.9-34.1) 


 


MCHC  33.7 g/dL g/dL





   (32.4-36.7) 


 


RDW  15.0 % %





   (11.5-15.2) 


 


Plt Count  187 10^3/uL 10^3/uL





   (150-400) 


 


MPV  10.1 fL fL





   (8.7-11.7) 


 


Neut % (Auto)  79.9 % H %





   (39.3-74.2) 


 


Lymph % (Auto)  15.2 % %





   (15.0-45.0) 


 


Mono % (Auto)  2.6 % L %





   (4.5-13.0) 


 


Eos % (Auto)  1.4 % %





   (0.6-7.6) 


 


Baso % (Auto)  0.6 % %





   (0.3-1.7) 


 


Nucleat RBC Rel Count  0.0 % %





   (0.0-0.2) 


 


Absolute Neuts (auto)  6.38 10^3/uL 10^3/uL





   (1.70-6.50) 


 


Absolute Lymphs (auto)  1.21 10^3/uL 10^3/uL





   (1.00-3.00) 


 


Absolute Monos (auto)  0.21 10^3/uL L 10^3/uL





   (0.30-0.80) 


 


Absolute Eos (auto)  0.11 10^3/uL 10^3/uL





   (0.03-0.40) 


 


Absolute Basos (auto)  0.05 10^3/uL 10^3/uL





   (0.02-0.10) 


 


Absolute Nucleated RBC  0.00 10^3/uL 10^3/uL





   (0-0.01) 


 


Immature Gran %  0.3 % %





   (0.0-1.1) 


 


Immature Gran #  0.02 10^3/uL 10^3/uL





   (0.00-0.10) 


 


PT  





  


 


INR  





  


 


APTT  





  


 


Sodium  





  


 


Potassium  





  


 


Chloride  





  


 


Carbon Dioxide  





  


 


Anion Gap  





  


 


BUN  





  


 


Creatinine  





  


 


Estimated GFR  





  


 


Glucose  





  


 


Calcium  





  


 


Troponin I  





  














Departure





- Departure


Disposition: Home, Routine, Self-Care


Clinical Impression: 


Atrial fibrillation


Qualifiers:


 Atrial fibrillation type: chronic Qualified Code(s): I48.2 - Chronic atrial 

fibrillation





Chest pain


Qualifiers:


 Chest pain type: unspecified Qualified Code(s): R07.9 - Chest pain, unspecified





Condition: Good


Instructions:  Chest Pain (ED)


Additional Instructions: 


Continue your antibiotics at [facility] were prescribed to you for urinary tract 

infection at [facility] this morning.  Return for worsening chest 

discomfort or trouble breathing.  Please call your regular physician at [facility] 

in the morning and make a follow-up appointment in the next 2-3 days


Referrals: 


NONE *PRIMARY CARE P,. [Primary Care Provider] - As per Instructions


[facility] [Outside] - 2-3 days, call for appt.

## 2017-05-12 ENCOUNTER — HOSPITAL ENCOUNTER (INPATIENT)
Dept: HOSPITAL 80 - FED | Age: 77
LOS: 2 days | Discharge: HOME | DRG: 313 | End: 2017-05-14
Attending: HOSPITALIST | Admitting: HOSPITALIST
Payer: COMMERCIAL

## 2017-05-12 DIAGNOSIS — I50.22: ICD-10-CM

## 2017-05-12 DIAGNOSIS — R07.9: Primary | ICD-10-CM

## 2017-05-12 DIAGNOSIS — Z95.5: ICD-10-CM

## 2017-05-12 DIAGNOSIS — N20.0: ICD-10-CM

## 2017-05-12 DIAGNOSIS — N18.3: ICD-10-CM

## 2017-05-12 DIAGNOSIS — I25.10: ICD-10-CM

## 2017-05-12 DIAGNOSIS — I48.2: ICD-10-CM

## 2017-05-12 DIAGNOSIS — I51.89: ICD-10-CM

## 2017-05-12 LAB
% IMMATURE GRANULYOCYTES: 0.4 % (ref 0–1.1)
ABSOLUTE IMMATURE GRANULOCYTES: 0.03 10^3/UL (ref 0–0.1)
ABSOLUTE NRBC COUNT: 0 10^3/UL (ref 0–0.01)
ADD DIFF?: NO
ADD MORPH?: NO
ADD SCAN?: NO
ANION GAP SERPL CALC-SCNC: 13 MEQ/L (ref 8–16)
APTT BLD: 36 SEC (ref 23–38)
ATYPICAL LYMPHOCYTE FLAG: 0 (ref 0–99)
CALCIUM SERPL-MCNC: 9.9 MG/DL (ref 8.5–10.4)
CHLORIDE SERPL-SCNC: 107 MEQ/L (ref 97–110)
CO2 SERPL-SCNC: 21 MEQ/L (ref 22–31)
CREAT SERPL-MCNC: 1.3 MG/DL (ref 0.7–1.3)
ERYTHROCYTE [DISTWIDTH] IN BLOOD BY AUTOMATED COUNT: 15.4 % (ref 11.5–15.2)
FRAGMENT RBC FLAG: 0 (ref 0–99)
GFR SERPL CREATININE-BSD FRML MDRD: 54 ML/MIN/{1.73_M2}
GLUCOSE SERPL-MCNC: 98 MG/DL (ref 70–100)
HCT VFR BLD CALC: 44.7 % (ref 40–51)
HGB BLD-MCNC: 14.9 G/DL (ref 13.7–17.5)
INR PPP: 1.33 (ref 0.83–1.16)
LEFT SHIFT FLG: 0 (ref 0–99)
LIPEMIA HEMOLYSIS FLAG: 80 (ref 0–99)
MCH RBC BLDCO QN: 31.2 PG (ref 27.9–34.1)
MCHC RBC AUTO-ENTMCNC: 33.3 G/DL (ref 32.4–36.7)
MCV RBC AUTO: 93.5 FL (ref 81.5–99.8)
NRBC-AUTO%: 0 % (ref 0–0.2)
PLATELET # BLD: 273 10^3/UL (ref 150–400)
PLATELET CLUMPS FLAG: 0 (ref 0–99)
PMV BLD AUTO: 9.3 FL (ref 8.7–11.7)
POTASSIUM SERPL-SCNC: 4.1 MEQ/L (ref 3.5–5.2)
PROTHROMBIN TIME: 16.5 SEC (ref 12–15)
RBC # BLD AUTO: 4.78 10^6/UL (ref 4.4–6.38)
SODIUM SERPL-SCNC: 141 MEQ/L (ref 134–144)
TROPONIN I SERPL-MCNC: 0.09 NG/ML (ref 0–0.03)

## 2017-05-12 PROCEDURE — G0378 HOSPITAL OBSERVATION PER HR: HCPCS

## 2017-05-12 PROCEDURE — A9500 TC99M SESTAMIBI: HCPCS

## 2017-05-12 PROCEDURE — G0480 DRUG TEST DEF 1-7 CLASSES: HCPCS

## 2017-05-12 RX ADMIN — DIGOXIN SCH MCG: 125 TABLET ORAL at 22:29

## 2017-05-12 RX ADMIN — OXYCODONE HYDROCHLORIDE PRN MG: 15 TABLET ORAL at 22:30

## 2017-05-12 NOTE — EDPHY
H & P


Time Seen by Provider: 05/12/17 18:23


HPI/ROS: 





CHIEF COMPLAINT:  Chest pain





HISTORY OF PRESENT ILLNESS:  Patient is a history of coronary disease in atrial 

fibrillation with stenting in November in his LAD her at [facility].  He 

was admitted 2 days ago for kidney stones at [facility].


He presents today with 2 hours of substernal chest pain without radiation.  No 

associated cough or nausea or diaphoresis.  Symptoms were moderate but in our 

ED states pretty much gone.  He got full-dose aspirin by paramedics.





REVIEW OF SYSTEMS:


Eye: no change in vision


ENT: no sore throat


Cardiac:  HPI


Pulmonary: no cough or SOB


Abdomen: no vomiting, diarrhea, abdominal pain


Musculoskeletal: no back pain


Skin: no rash


Neuro: no headache


Constitutional: no fever


: no urinary symptoms





A comprehensive 10 point review of systems is otherwise negative aside from 

elements mentioned in the history of present illness.





PAST MEDICAL HISTORY: Discussed [facility] 1829, Admit 5/8 EGS nonischemic CM, 

Atrial fibrillation on warfarin, diabetes, renal colic, CKD.  CHF, PCI to LAD 

on 11/16/2016.


Admit 4/2/17 for CP at [facility].


Recent admission to [facility] for kidney stones with ureteral stent.





Social history:  Nonsmoker





General Appearance: Alert and conversant, cooperative.


Eyes: No scleral  icterus. 


ENT, Mouth: Normal mucous membranes.


Respiratory: Normal respiratory effort, breath sounds equal, lungs are clear to 

auscultation.


Cardiovascular:  Regular rate and rhythm.


Gastrointestinal:  Abdomen is soft and non tender.


Neurological: Alert and oriented x3.   Normally conversant. Face symmetric, 

normal movement and sensation in all extremities.


Skin: Warm and dry, no rashes.


Musculoskeletal: No peripheral edema and no joint swelling. No calf tenderness.


Psychiatric: Not agitated.





Emergency Department course/MDM:


Received aspirin in the field by EMS.


1930:  Troponin 0.091


Per [facility] the a requests that we admit the patient to [facility], no transfer. No 

CP now after 2 hours of symptoms.


Admit for risk stratification patient with chest pain elevated troponin and 

recent cardiac stenting within the past year.


Smoking Status: Never smoked


Constitutional: 


 Initial Vital Signs











Temperature (C)  36.9 C   05/12/17 18:37


 


Heart Rate  94   05/12/17 18:37


 


Respiratory Rate  16   05/12/17 18:37


 


Blood Pressure  118/84 H  05/12/17 18:37


 


O2 Sat (%)  95   05/12/17 18:37








 











O2 Delivery Mode               Room Air














Allergies/Adverse Reactions: 


 





atorvastatin calcium [From Lipitor] Allergy (Severe, Verified 04/19/17 06:58)


 Other-Enter Comments


haloperidol [From Haldol] Allergy (Verified 04/19/17 06:58)


 


simvastatin Allergy (Verified 05/12/17 18:42)


 








Home Medications: 














 Medication  Instructions  Recorded


 


Acetaminophen [Tylenol 325mg (*)] 325 - 650 mg PO Q4H PRN 05/12/17


 


Allopurinol [Allopurinol 300  mg PO DAILY 05/12/17





(RX)]  


 


Clopidogrel Bisulfate [Plavix (*)] 75 mg PO DAILY 05/12/17


 


Digoxin [Lanoxin 125 mcg (RX)] 125 mcg PO HS 05/12/17


 


Furosemide [Lasix 20 MG (*)] 20 mg PO DAILY@1600 05/12/17


 


Furosemide [Lasix 40 MG (*)] 40 mg PO DAILY 05/12/17


 


Losartan Potassium [Cozaar 25 mg 12.5 mg PO DAILY 05/12/17





(*)]  


 


Metoprolol Succinate Xr [Toprol Xl 100 mg PO DAILY 05/12/17





100 mg (*)]  


 


Nitroglycerin [Nitrostat 0.4 mg 0.4 mg SL AD PRN 05/12/17





(*)]  


 


Spironolactone [Aldactone 25 MG 12.5 mg PO DAILY 05/12/17





(*)]  


 


Warfarin Sodium [Coumadin 5MG (*)] 5 mg PO SUMOWETHFR 05/12/17


 


Warfarin Sodium [Coumadin 7.5MG 7.5 mg PO TUSA 05/12/17





(*)]  


 


oxyCODONE IR [Oxycodone Ir (*)] 2.5 - 5 mg PO Q4H PRN 05/12/17














Medical Decision Making





- Diagnostics


EKG Interpretation: 





12-lead EKG interpreted by me; official reading is in trace master.  My 

interpretation is  atrial fibrillation rate 94 with PVC and right bundle branch 

block.


Imaging Results: 


 Imaging Impressions





Chest X-Ray  05/12/17 18:31


Impression: Cardiomegaly and pulmonary venous hypertension. No melvin failure.











Differential Diagnosis: 





Differential diagnosis considered for chest pain including but not limited to 

myocardial ischemia, aortic dissection, pericarditis, pulmonary embolus, chest 

wall pain, pleural inflammation and pulmonary infectious causes.


Consult/Admit Bed Type: [facility] 1932, [place] 1959





- Data Points


Laboratory Results: 


 Laboratory Results





 05/12/17 18:35 





 05/12/17 18:35 





 











  05/12/17 05/12/17 05/12/17





  18:35 18:35 18:35


 


WBC      7.09 10^3/uL 10^3/uL





     (3.80-9.50) 


 


RBC      4.78 10^6/uL 10^6/uL





     (4.40-6.38) 


 


Hgb      14.9 g/dL g/dL





     (13.7-17.5) 


 


Hct      44.7 % %





     (40.0-51.0) 


 


MCV      93.5 fL fL





     (81.5-99.8) 


 


MCH      31.2 pg pg





     (27.9-34.1) 


 


MCHC      33.3 g/dL g/dL





     (32.4-36.7) 


 


RDW      15.4 % H %





     (11.5-15.2) 


 


Plt Count      273 10^3/uL 10^3/uL





     (150-400) 


 


MPV      9.3 fL fL





     (8.7-11.7) 


 


Neut % (Auto)      61.9 % %





     (39.3-74.2) 


 


Lymph % (Auto)      29.6 % %





     (15.0-45.0) 


 


Mono % (Auto)      6.5 % %





     (4.5-13.0) 


 


Eos % (Auto)      0.8 % %





     (0.6-7.6) 


 


Baso % (Auto)      0.8 % %





     (0.3-1.7) 


 


Nucleat RBC Rel Count      0.0 % %





     (0.0-0.2) 


 


Absolute Neuts (auto)      4.38 10^3/uL 10^3/uL





     (1.70-6.50) 


 


Absolute Lymphs (auto)      2.10 10^3/uL 10^3/uL





     (1.00-3.00) 


 


Absolute Monos (auto)      0.46 10^3/uL 10^3/uL





     (0.30-0.80) 


 


Absolute Eos (auto)      0.06 10^3/uL 10^3/uL





     (0.03-0.40) 


 


Absolute Basos (auto)      0.06 10^3/uL 10^3/uL





     (0.02-0.10) 


 


Absolute Nucleated RBC      0.00 10^3/uL 10^3/uL





     (0-0.01) 


 


Immature Gran %      0.4 % %





     (0.0-1.1) 


 


Immature Gran #      0.03 10^3/uL 10^3/uL





     (0.00-0.10) 


 


PT    16.5 SEC H SEC  





    (12.0-15.0)  


 


INR    1.33  H   





    (0.83-1.16)  


 


APTT    36.0 SEC SEC  





    (23.0-38.0)  


 


Sodium  141 mEq/L mEq/L    





   (134-144)   


 


Potassium  4.1 mEq/L mEq/L    





   (3.5-5.2)   


 


Chloride  107 mEq/L mEq/L    





   ()   


 


Carbon Dioxide  21 mEq/l L mEq/l    





   (22-31)   


 


Anion Gap  13 mEq/L mEq/L    





   (8-16)   


 


BUN  23 mg/dL mg/dL    





   (7-23)   


 


Creatinine  1.3 mg/dL mg/dL    





   (0.7-1.3)   


 


Estimated GFR  54     





    


 


Glucose  98 mg/dL mg/dL    





   ()   


 


Calcium  9.9 mg/dL mg/dL    





   (8.5-10.4)   


 


Troponin I  0.091 ng/mL H ng/mL    





   (0-0.034)   











Medications Given: 


 








Discontinued Medications





Furosemide (Lasix Injection)  40 mg IVP ONCE ONE


   Stop: 05/12/17 21:46


   Last Admin: 05/12/17 22:29 Dose:  40 mg








Departure





- Departure


Disposition: [facility]


Clinical Impression: 


 Troponin level elevated





Chest pain


Qualifiers:


 Chest pain type: unspecified Qualified Code(s): R07.9 - Chest pain, unspecified





Condition: Good

## 2017-05-12 NOTE — CPEKG
Heart Rate: 94

RR Interval: 638

QRSD Interval: 164

QT Interval: 432

QTC Interval: 541

QRS Axis: 100

T Wave Axis: 17

EKG Severity - ABNORMAL ECG -

EKG Impression: ATRIAL FIBRILLATION, V-RATE 

EKG Impression: VENTRICULAR PREMATURE COMPLEX

EKG Impression: RIGHT BUNDLE BRANCH BLOCK

EKG Impression: LVH BY VOLTAGE

Electronically Signed By: Singh Galvan 12-May-2017 23:01:10

## 2017-05-12 NOTE — PDGENHP
History and Physical





- Chief Complaint


 acute chest pain





- History of Present Illness


 primary care provider:  [facility]


Primary cardiologist:  Dr. Gunter


Primary urologist:  Dr. Lainez





HPI:  76-year-old male presenting with acute chest pain characterized as a 

pressure sensation located on his left anterior chest with associated shortness 

of breath, symptom onset mid afternoon and duration approximately 2 hours.  

Patient reports the symptoms began at rest while he was asleep and awoke him 

from a nap.  The symptoms were alleviated by full-dose aspirin received during 

EMS transport.  They went from level 6 to level 3. he reports that it is 

difficult to determine whether his chest pain symptoms are similar to those 

which he has experienced in the past in the setting of CHF as well as 

obstructive coronary disease.  He reports he has otherwise been taking all of 

his home medications and prior to his onset of symptoms, he had otherwise been 

feeling well.  He reports he is not particularly physically active but he has 

not been experiencing any chest pain or shortness of breath with the minimal 

physical exertion he does do.





History Information





- Allergies/Home Medication List


Allergies/Adverse Reactions: 








atorvastatin calcium [From Lipitor] Allergy (Severe, Verified 17 06:58)


 Other-Enter Comments


haloperidol [From Haldol] Allergy (Verified 17 06:58)


 


simvastatin Allergy (Verified 17 18:42)


 





Home Medications: 








Acetaminophen [Tylenol 325mg (*)] 325 - 650 mg PO Q4H PRN 17 [Last Taken 

Unknown]


Allopurinol [Allopurinol 300 MG (RX)] 150 mg PO DAILY 17 [Last Taken ]


Clopidogrel Bisulfate [Plavix (*)] 75 mg PO DAILY 17 [Last Taken 17]


Digoxin [Lanoxin 125 mcg (RX)] 125 mcg PO HS 17 [Last Taken 17]


Furosemide [Lasix 20 MG (*)] 20 mg PO DAILY@1600 17 [Last Taken 17]


Furosemide [Lasix 40 MG (*)] 40 mg PO DAILY 17 [Last Taken 17]


Losartan Potassium [Cozaar 25 mg (*)] 12.5 mg PO DAILY 17 [Last Taken ]


Metoprolol Succinate Xr [Toprol Xl 100 mg (*)] 100 mg PO DAILY 17 [Last 

Taken 17]


Nitroglycerin [Nitrostat 0.4 mg (*)] 0.4 mg SL AD PRN 17 [Last Taken 

Unknown]


Spironolactone [Aldactone 25 MG (*)] 12.5 mg PO DAILY 17 [Last Taken ]


Warfarin Sodium [Coumadin 5MG (*)] 5 mg PO SUMOWETHFR 17 [Last Taken ]


Warfarin Sodium [Coumadin 7.5MG (*)] 7.5 mg PO TUSA 17 [Last Taken ]


oxyCODONE IR [Oxycodone Ir (*)] 2.5 - 5 mg PO Q4H PRN 17 [Last Taken 

Unknown]





I have personally reviewed and updated: family history, medical history, social 

history, surgical history





- Past Medical History


atrial fibrillation (  Permanent), coronary artery disease ( with LAD stent 

2016, ischemic cardiomyopathy), CHF ( systolic with ejection 

fraction 25%)


Additional medical history: nonobstructive CAD.  Afib on systemic 

anticoagulation.  HTN.  gout.  nephrolithiasis with recent stent placed.  Left 

atrial thrombus.  Cognitive impairment





- Surgical History


Additional surgical history: R total knee replacement.  appendectomy.  Recent 

ureteral stent placement.  LAD stent 2016





- Family History


Additional family history: Pt reports every male family member has  of CAD/

heart disease





- Social History


Smoking Status: Never smoked


Alcohol Use: None


Drug Use: None


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 

hospitals. Lives alone, is independent in ADLs.





Review of Systems


ROS: 10pt was reviewed & negative except for what was stated in HPI & below


Cardiac: Reports: chest pain


Respiratory: Reports: shortness of breath





Physical Exam














Temp Pulse Resp BP Pulse Ox


 


 35.6 C L  98   20   116/84 H  96 


 


 17 20:51  17 20:51  17 20:51  17 20:51  17 20:51











Constitutional: no apparent distress, not in pain, chronically ill appearing, 

No uncomfortable


Eyes: PERRL, anicteric sclera, EOMI


Ears, Nose, Mouth, Throat: moist mucous membranes, hearing normal, ears appear 

normal, no oral mucosal ulcers


Cardiovascular: systolic murmur ( 3/6 at apex radiating into the axilla), 

irregularly irregular, edema ( trace bilateral lower extremity), No tachycardia


Respiratory: inspiratory crackles ( bilateral bases), No reduced air movement, 

No expiratory wheeze, No bronchial breath sounds


Gastrointestinal: normoactive bowel sounds, soft, non-tender abdomen, no 

palpable masses


Musculoskeletal: normal joint ROM ( left shoulder), other ( no tenderness to 

palpation over the bilateral pectoralis)


Neurologic: AAOx3, sensation intact bilaterally, No weakness ( motor strength 5/

5 bilateral lower extremity)


Psychiatric: interacting appropriately, not anxious, not encephalopathic, 

thought process linear





Lab Data & Imaging Review





 17 18:35





 17 18:35














WBC  7.09 10^3/uL (3.80-9.50)   17  18:35    


 


RBC  4.78 10^6/uL (4.40-6.38)   17  18:35    


 


Hgb  14.9 g/dL (13.7-17.5)   17  18:35    


 


Hct  44.7 % (40.0-51.0)   17  18:35    


 


MCV  93.5 fL (81.5-99.8)   17  18:35    


 


MCH  31.2 pg (27.9-34.1)   17  18:35    


 


MCHC  33.3 g/dL (32.4-36.7)   17  18:35    


 


RDW  15.4 % (11.5-15.2)  H  17  18:35    


 


Plt Count  273 10^3/uL (150-400)   17  18:35    


 


MPV  9.3 fL (8.7-11.7)   17  18:35    


 


Neut % (Auto)  61.9 % (39.3-74.2)   17  18:35    


 


Lymph % (Auto)  29.6 % (15.0-45.0)   17  18:35    


 


Mono % (Auto)  6.5 % (4.5-13.0)   17  18:35    


 


Eos % (Auto)  0.8 % (0.6-7.6)   17  18:35    


 


Baso % (Auto)  0.8 % (0.3-1.7)   17  18:35    


 


Nucleat RBC Rel Count  0.0 % (0.0-0.2)   17  18:35    


 


Absolute Neuts (auto)  4.38 10^3/uL (1.70-6.50)   17  18:35    


 


Absolute Lymphs (auto)  2.10 10^3/uL (1.00-3.00)   17  18:35    


 


Absolute Monos (auto)  0.46 10^3/uL (0.30-0.80)   17  18:35    


 


Absolute Eos (auto)  0.06 10^3/uL (0.03-0.40)   17  18:35    


 


Absolute Basos (auto)  0.06 10^3/uL (0.02-0.10)   17  18:35    


 


Absolute Nucleated RBC  0.00 10^3/uL (0-0.01)   17  18:35    


 


Immature Gran %  0.4 % (0.0-1.1)   17  18:35    


 


Immature Gran #  0.03 10^3/uL (0.00-0.10)   17  18:35    


 


PT  16.5 SEC (12.0-15.0)  H  17  18:35    


 


INR  1.33  (0.83-1.16)  H  17  18:35    


 


APTT  36.0 SEC (23.0-38.0)   17  18:35    


 


Sodium  141 mEq/L (134-144)   17  18:35    


 


Potassium  4.1 mEq/L (3.5-5.2)   17  18:35    


 


Chloride  107 mEq/L ()   17  18:35    


 


Carbon Dioxide  21 mEq/l (22-31)  L  17  18:35    


 


Anion Gap  13 mEq/L (8-16)   17  18:35    


 


BUN  23 mg/dL (7-23)   17  18:35    


 


Creatinine  1.3 mg/dL (0.7-1.3)   17  18:35    


 


Estimated GFR  54   17  18:35    


 


Glucose  98 mg/dL ()   17  18:35    


 


Calcium  9.9 mg/dL (8.5-10.4)   17  18:35    


 


Troponin I  0.091 ng/mL (0-0.034)  H  17  18:35    








Visualized and Interpreted Chest x-ray results: Yes


Chest X-Ray results: other ( mild pulmonary vascular congestion)


Visualized and Interpreted EKG results: Yes


EKG Interpretation: Positive for: other ( right bundle branch block with atrial 

fibrillation)





Assessment & Plan


Assessment: 








 76-year-old male presenting with acute chest pain in the setting of known 

coronary artery disease, chronic systolic congestive heart failure








Plan: 





 1. Chest pain.  Acute, new problem this provider, further workup indicated.  

Potential etiologies include unstable angina versus acute systolic CHF 

exacerbation versus GERD verses distinctly atypical chest pain symptoms.


- I suspect the patient's chest pain is either atypical or secondary to small 

vessel ischemia with a marginally elevated troponin level but cardiac 

catheterization within the last 6 months


-outside records reviewed including 0  at presentation to emergency 

department for similar symptoms comma chest pain and shortness of breath, 

evaluated by Dr. Darinel Kirby, patient decided to leave prior to any further 

risk stratification


- he does not appear to have overt congestive heart failure but with his trace 

bilateral lower extremity edema and mild pulmonary vascular congestion will 

give him 1 dose of IV Lasix this evening and gauge response in a.m., monitoring 

urine output and serum creatinine level


- cycle cardiac enzymes


-monitor on telemetry


-will consult with Cardiology and request that they help us determine whether 

to risk stratify further





2. chronic systolic congestive heart failure.  No evidence of overt exacerbation

, check BNP, monitor urine output





3. Coronary artery disease.  Chronic, most recent LAD stent placed in November, 

patient reports he has been adherent to his medications





4. left atrial thrombus.  Patient previously had a left atrial clot identified 

and systemically anticoagulated


-continue on Coumadin, monitor INR


-patient would like guidance as to when he is supposed to discontinue Coumadin 

and initiate bridging Lovenox prior to his ureteral stent removal procedure 

next week





5. Chronic kidney disease stage 3. creatinine is currently within his baseline 

of 1.3-1.7





Diet.  Cardiac diet, NPO in a.m. in case a procedure is indicated





Prophylaxis.  Currently systemically anticoagulated





Code.  Full per patient, sister is MPOA





Disposition.  Anticipated discharge 2017, pending further workup as 

outlined above.





I have discussed patient's presentation with Dr. Singh Galvan, we both agree that 

the patient is appropriate for the [facility].





Dr. Galvan has contacted [facility] and they have authorized from to 

provide patient with care since there hospital is currently full.

## 2017-05-13 VITALS — RESPIRATION RATE: 18 BRPM

## 2017-05-13 LAB
% IMMATURE GRANULYOCYTES: 0.3 % (ref 0–1.1)
ABSOLUTE IMMATURE GRANULOCYTES: 0.02 10^3/UL (ref 0–0.1)
ABSOLUTE NRBC COUNT: 0 10^3/UL (ref 0–0.01)
ADD DIFF?: NO
ADD MORPH?: NO
ADD SCAN?: NO
ALBUMIN SERPL-MCNC: 3.6 G/DL (ref 3.5–5)
ALP SERPL-CCNC: 103 IU/L (ref 38–126)
ALT SERPL-CCNC: 27 IU/L (ref 21–72)
ANION GAP SERPL CALC-SCNC: 10 MEQ/L (ref 8–16)
AST SERPL-CCNC: 28 IU/L (ref 17–59)
ATYPICAL LYMPHOCYTE FLAG: 0 (ref 0–99)
BILIRUB SERPL-MCNC: 1.3 MG/DL (ref 0.1–1.4)
CALCIUM SERPL-MCNC: 8.9 MG/DL (ref 8.5–10.4)
CHLORIDE SERPL-SCNC: 109 MEQ/L (ref 97–110)
CO2 SERPL-SCNC: 21 MEQ/L (ref 22–31)
COLOR UR: YELLOW
CREAT SERPL-MCNC: 1.2 MG/DL (ref 0.7–1.3)
ERYTHROCYTE [DISTWIDTH] IN BLOOD BY AUTOMATED COUNT: 15.4 % (ref 11.5–15.2)
FRAGMENT RBC FLAG: 0 (ref 0–99)
GFR SERPL CREATININE-BSD FRML MDRD: 59 ML/MIN/{1.73_M2}
GLUCOSE SERPL-MCNC: 105 MG/DL (ref 70–100)
HCT VFR BLD CALC: 41.3 % (ref 40–51)
HGB BLD-MCNC: 13.9 G/DL (ref 13.7–17.5)
INR PPP: 1.36 (ref 0.83–1.16)
LEFT SHIFT FLG: 0 (ref 0–99)
LIPEMIA HEMOLYSIS FLAG: 80 (ref 0–99)
MCH RBC BLDCO QN: 31.3 PG (ref 27.9–34.1)
MCHC RBC AUTO-ENTMCNC: 33.7 G/DL (ref 32.4–36.7)
MCV RBC AUTO: 93 FL (ref 81.5–99.8)
MUCOUS THREADS #/AREA URNS LPF: (no result) /LPF
NITRITE UR QL STRIP: NEGATIVE
NRBC-AUTO%: 0 % (ref 0–0.2)
PH UR STRIP: 5 [PH] (ref 5–7.5)
PHENCYCLIDINE URINE BCH: NEGATIVE NG/ML
PLATELET # BLD: 221 10^3/UL (ref 150–400)
PLATELET CLUMPS FLAG: 0 (ref 0–99)
PMV BLD AUTO: 9.2 FL (ref 8.7–11.7)
POTASSIUM SERPL-SCNC: 4.3 MEQ/L (ref 3.5–5.2)
PROT SERPL-MCNC: 6.5 G/DL (ref 6.3–8.2)
PROTHROMBIN TIME: 16.8 SEC (ref 12–15)
RBC # BLD AUTO: 4.44 10^6/UL (ref 4.4–6.38)
RBC #/AREA URNS HPF: (no result) /HPF (ref 0–3)
SODIUM SERPL-SCNC: 140 MEQ/L (ref 134–144)
SP GR UR STRIP: 1.01 (ref 1–1.03)
TETRAHYDROCANNABINOL URINE: 399 NG/ML
TETRAHYDROCANNABINOL URINE: 399 NG/ML
TROPONIN I SERPL-MCNC: 0.11 NG/ML (ref 0–0.03)
WBC #/AREA URNS HPF: (no result) /HPF (ref 0–3)

## 2017-05-13 RX ADMIN — NITROGLYCERIN PRN MG: 0.4 TABLET SUBLINGUAL at 08:31

## 2017-05-13 RX ADMIN — FUROSEMIDE SCH MG: 40 TABLET ORAL at 09:47

## 2017-05-13 RX ADMIN — ALLOPURINOL SCH: 300 TABLET ORAL at 09:46

## 2017-05-13 RX ADMIN — NITROGLYCERIN PRN MG: 0.4 TABLET SUBLINGUAL at 08:18

## 2017-05-13 RX ADMIN — DIGOXIN SCH MCG: 125 TABLET ORAL at 20:19

## 2017-05-13 RX ADMIN — CLOPIDOGREL BISULFATE SCH MG: 75 TABLET, FILM COATED ORAL at 09:46

## 2017-05-13 RX ADMIN — ASPIRIN SCH MG: 325 TABLET, FILM COATED ORAL at 11:18

## 2017-05-13 RX ADMIN — OXYCODONE HYDROCHLORIDE PRN MG: 15 TABLET ORAL at 22:01

## 2017-05-13 RX ADMIN — SPIRONOLACTONE SCH MG: 25 TABLET, FILM COATED ORAL at 09:48

## 2017-05-13 RX ADMIN — METOPROLOL SUCCINATE SCH MG: 100 TABLET, EXTENDED RELEASE ORAL at 09:47

## 2017-05-13 RX ADMIN — FUROSEMIDE SCH: 40 TABLET ORAL at 10:49

## 2017-05-13 RX ADMIN — LOSARTAN POTASSIUM SCH MG: 25 TABLET, FILM COATED ORAL at 10:06

## 2017-05-13 RX ADMIN — FUROSEMIDE SCH MG: 10 INJECTION, SOLUTION INTRAMUSCULAR; INTRAVENOUS at 16:06

## 2017-05-13 NOTE — GPN
[f rep st]



                                                                 PROCEDURE NOTE





DATE OF PROCEDURE:  05/13/2017



PROCEDURE PERFORMED:  Lexiscan pharmacologic nuclear stress test.



INDICATIONS:  Substernal chest pain and pressure in the setting of a known history of coronary arter
y disease with previous PCI to the proximal LAD in November 2016 with mildly elevated troponin.



PROCEDURE:  After informed consent was obtained for Lexiscan nuclear stress test, initial vital sign
s were obtained, demonstrating a resting heart rate of 93 beats per minute, irregularly irregular rh
ythm of atrial fibrillation with right bundle branch block.  Blood pressure was 121/88 and oxygen sa
turation of 97%.  He received an injection of Lexiscan 0.4 mg in 5 mL, injected at 12:22 p.m.  [facility], he was injected with 20.2 mg of TC-99m sestamibi.  The patient had no complaints of ch
est pain or chest pressure throughout the course of the study.  He had no complaints at all through 
the study.  He had no ECG changes through the course of the entire study. 



Vital signs in the recovery phase of atrial fibrillation at 93 beats per minute with blood pressure 
of 120/85 and oxygen saturation of 95%.  Repeat values were essentially unchanged, with a heart rate
 of 95 beats per minute, blood pressure 120/85, and oxygen saturation of 97%.



CONCLUSIONS:  

1.  Normal pharmacologic stress test without symptoms or ECG changes.  

2.  Nuclear imaging pending.





Job #:  [id number]

## 2017-05-13 NOTE — HOSPPROG
Hospitalist Progress Note


Assessment/Plan: 





The patient is a a 76-year-old male with PMH cardiomyopathy who was admitted 

for chest pain, nausea, left arm numbness.





ASSESSMENT/PLAN:


Acute chest pain - suspect unstable angina


Chronic systolic CHF


Nonischemic cardiomyopathy


Coronary artery disease-history of LAD stent


Atrial fibrillation


Left atrial thrombus


Chronic anticoagulation with warfarin -sub therapeutic


CKD stage 3


Nephrolithiasis with left-sided flank pain


Suspect medical noncompliance


Poor social support





-Cardiology recommendations appreciated. 


-PCU status - monitor on telemetry


-TTE checked - no change.


-Lexiscan stress test - spoke w/ Radiologist Dr. Emery - stress portion 

demonstrates defect in inferolateral walls, apex. Resting images tomorrow.


-Suspect noncompliance since subtherapeutic and w/ recurrent admissions with 

similar CP. 


-Attempted to call [facility] for Dr. Casillas, this patient claims he 

is getting a urology procedure on the 16th at [facility].  Could 

not leave a message with their telephone system.  Requested case management 

assist with this matter.


-per Cardiology recommendations, placed patient on full-dose aspirin.  Stopped 

warfarin.  Checked digoxin level.  Checked BNP level.





 


VTE prophylaxis:  Adding Lovenox


GI prophylaxis:  Not needed


Code Status:  Full.  Patient says he has 9 children most of whom are in 

[place].





Status:  Changing to inpatient for greater than 2 midnight stay.


Disposition:  PCU 





This patient is new to me.  Reviewed patient's chart/records for this visit.


Personally discussed case with RNs, cardiologists, radiologist.


____





SUBJECTIVE:  Today patient was complaining of retrosternal chest pain, 

diaphoresis, nausea.  He admitted that his symptoms are worse when he lies 

down.  Lying down makes him gasp for air.  His symptoms are better when he sits 

upright or stands.  He denies any swelling in his legs.  He denies a cough or 

shortness of breath.





OBJECTIVE:





Physical Exam:


General: The patient is an elderly male who is alert and in mild acute 

distress. 


HEENT: normocephalic, extraocular movements intact, conjunctivae clear. Mucous 

membranes moist.


Neck: trachea midline, no visible masses. 


CV: +S1/S2, irregular rhythm, rate is not slow or fast, + rub vs grade 4 murmur 

best auscultated at apex of heart. + displaced PMI.


Resp: unlabored, CTAB no RRW.


Abd: soft and nondistended. Bowel sounds present.  Mildly tender left upper 

quadrant.  


Musculoskeletal:  Normal muscle tone/bulk.


Neuro: cranial nerves II  XII grossly intact. Intact gross motor and sensory 

function.


Psych:  anxious mood and appropriate affect. 


Skin:  Mild pallor.  No petechiae.


Heme/lymph:  No peripheral edema at bilateral lower extremities.





Labs/Imaging/Other Tests: Personally reviewed/interpreted.


EKG:  Atrial fibrillation, no acute ischemia, several PVCs.


Chest x-ray:  No acute cardiopulmonary abnormality.


Troponins:  Mildly elevated.


BMP:  Around 11,000.


Objective: 


 Vital Signs











Temp Pulse Resp BP Pulse Ox


 


 36.4 C   65   18   106/79   94 


 


 05/13/17 15:31  05/13/17 15:31  05/13/17 15:31  05/13/17 15:31  05/13/17 15:31








 Laboratory Results





 05/13/17 04:58 





 05/13/17 04:58 





 











 05/12/17 05/13/17 05/14/17





 05:59 05:59 05:59


 


Intake Total  450 


 


Output Total  1200 


 


Balance  -750 








 











PT  16.8 SEC (12.0-15.0)  H  05/13/17  04:58    


 


INR  1.36  (0.83-1.16)  H  05/13/17  04:58    














ICD10 Worksheet


Patient Problems: 


 Problems











Problem Status Onset


 


Chest pain Acute  


 


Troponin level elevated Acute  


 


Acute decompensated heart failure Acute  


 


Acute exacerbation of CHF (congestive heart failure) Acute  


 


Atrial fibrillation with rapid ventricular response Acute  


 


Cardiomyopathy Acute  


 


Chest pain Acute  


 


Chest pain at rest Acute  


 


Chest pain in adult Acute  


 


Confusion with nonfocal neurological examination Acute  


 


Congestive heart failure Acute  


 


Constipation Acute  


 


Hematuria Acute  


 


Near syncope Acute  


 


Nephrolithiasis Acute  


 


Ureterolithiasis Acute

## 2017-05-13 NOTE — CPEKG
Heart Rate: 94

RR Interval: 638

QRSD Interval: 172

QT Interval: 448

QTC Interval: 561

QRS Axis: 108

T Wave Axis: -9

EKG Severity - ABNORMAL ECG -

EKG Impression: ATRIAL FIBRILLATION, V-RATE 

EKG Impression: VENTRICULAR PREMATURE COMPLEX

EKG Impression: ABERRANT COMPLEX, POSSIBLY SUPRAVENTRICULAR

EKG Impression: RIGHT BUNDLE BRANCH BLOCK

Electronically Signed By: Cathy Zuniga 15-May-2017 07:02:41

## 2017-05-13 NOTE — GCON
[f 
rep st]



                                                                    CONSULTATION





CARDIOLOGY CONSULTATION



DATE OF CONSULTATION:  05/13/2017





REASON FOR CONSULTATION:  Chest pain and shortness of breath.



HISTORY OF PRESENT ILLNESS:  The patient is a pleasant 76-year-old gentleman 
well known to [facility] and well known to [facility] who is 
followed for heart failure by Dr. Massey.  He has a known history of chronic 
cardiomyopathy which is out of proportion with his degree of coronary artery 
disease with LVEF of 17%, single-vessel coronary artery disease with PCI to the 
proximal LAD in November of 2016, permanent atrial fibrillation, New York Heart 
Association class 3 systolic congestive heart failure, chronic renal 
insufficiency, sleep apnea, and history of nephrolithiasis who is scheduled for 
a urologic procedure to address his kidney stones next week. 



I spent the last 2 hours reviewing his records in detail and discussing with 
the patient, as well as reviewing his cardiac cath films and office notes from 
[facility].  The patient has had approximately 8 admissions to [facility] for chest pain or heart failure exacerbation.  There are 
questions about medical noncompliance.  The patient assures me he has been 
compliant with his medications to date. 



The patient presented to [facility] early this morning after 
waking from sleep with what he describes as substernal chest pressure, 7/10, 
nonradiating, associated with shortness of breath, nausea and diaphoresis.  
These symptoms prompted him to seek medical attention.  He was admitted to the 
observation unit.  He has continued to have occasional episodes of substernal 
chest pressure this morning.  He again describes this episode as 7/10.  His 
pain resolved with a single sublingual nitroglycerin. 



At the time of my exam, he is resting comfortably without complaints and 
telling me jokes. 



In reviewing his records, he has been seen in consultation by Dr. Perry of 
Electrophysiology for consideration of biventricular ICD.  He informs me the 
biventricular ICD is scheduled to be performed sometime in the near future.  He 
is primarily followed by a cardiologist at [facility]. 



Currently at the time of my exam, he is resting comfortably.



PAST MEDICAL HISTORY:  

1.  Coronary artery disease with PCI to the LAD in November of 2016 at [facility] with evidence of nonobstructive disease within the RCA and 
LCX with mild luminal irregularities between 20% and 30%.  

2.  New York Heart Association class 3 systolic congestive heart failure.

3.  Cardiomyopathy of unclear etiology.  I do not think his coronary disease is 
responsible for global left ventricular hypokinesis of 17%.  

4.  Chronic atrial fibrillation.

5.  Chronic renal insufficiency.

6.  Sleep apnea.



MEDICATIONS ON ADMISSION:  

1.  Metoprolol succinate 100 mg daily.

2.  Digoxin 125 mcg daily.

3.  Plavix 75 mg daily.

4.  Lasix 40 mg in the morning and 20 mg at 1600.

5.  Losartan 12.5 mg daily.

6.  Spironolactone 12.5 mg daily.

7.  Coumadin 5 mg on Sunday, Monday, Wednesday, Thursday, Friday, and 7.5 mg 
Tuesday, Saturday.

8.  Allopurinol 150 mg daily.

9.  Oxycodone 2.5 to 5 mg p.o. q.4 hours p.r.n. pain.

10.  Nitroglycerin 0.4 mg sublingual q.5 minutes p.r.n. chest pain.

11.  Tylenol p.r.n.



ALLERGIES:  To medication include:  Atorvastatin, simvastatin, and Haldol.



SOCIAL HISTORY:  The patient is .  He lives alone in [place] in 
[place].  He is a nonsmoker.



PHYSICAL EXAMINATION:  VITAL SIGNS:  Blood pressure is 117/70, heart rate 92 in 
controlled atrial fibrillation, respiratory rate 16, oxygen saturation 95% on 2 
L.  His weight on admission was 82.3 kg.  GENERAL:  He is awake, alert, oriented
, appropriate, in no apparent distress.  NECK:  There is no evidence of JVP or 
carotid bruits.  LUNGS:  Clear to auscultation bilaterally.  CARDIAC:  S1, S2.  
Irregularly irregular.  There is a 2/6 systolic murmur at the apex consistent 
with mitral regurgitation.  ABDOMEN:  Soft, nontender, nondistended.  There is 
no pulsatile mass or abdominal bruit.  There is no evidence of abdominal 
distention or bloating.  Bowel sounds are normal. EXTREMITIES:  He has 2+ 
bilateral radial artery pulses.  He has 2+ bilateral femoral artery pulses, 2+ 
dorsalis pedis and posterior tibial pulses.  He has no evidence of cyanosis, 
clubbing or edema.



LABORATORY DATA:  White blood cell count 5.86, hemoglobin 13.9, hematocrit 43.3
, platelets 221, INR 1.36, sodium 140, potassium 4.3, chloride 109, bicarb 21, 
BUN 24, creatinine 1.2, glucose 105, AST 28, ALT 27.  Troponins:  Initial 
troponin was 0.091 increasing to 0.102 and most recently 0.108.  N-terminal 
proBNP 11,600.  He did have a BNP in April of 5470, prior to that a BNP of 12,
300 in February of 2017.



IMAGING:  Chest x-ray shows cardiomegaly with pulmonary venous hypertension.  
No evidence of failure.  No interstitial edema or effusion.  Lungs are clear. 



ECG demonstrates atrial fibrillation with no acute ischemic changes.



IMPRESSION:  

1.  New onset of atypical substernal chest pressure.  

2.  History of coronary artery disease with PCI to the LAD.  

3.  New York Heart Association class 3 congestive heart failure with LVEF of 17%
.

4.  History of left atrial thrombus.

5.  Chronic atrial fibrillation with rate control and anticoagulation with 
Coumadin.

6.  Chronic renal insufficiency.

7.  Nephrolithiasis with plan for urologic procedure next week.  



Summary:  The patient is a pleasant 76-year-old gentleman with multiple medical 
comorbidities as outlined above with new episodes of substernal chest pressure 
and tightness.  Troponin is relatively flat, although slightly elevated.  The 
previous troponin on admission was 0.05.  I question his compliance with 
medications in the setting of an INR of 1.3 coupled with a BNP that has doubled 
in the last month in the setting of a history of noncompliance.  At this time I 
would recommend Lexiscan nuclear stress test to assess for ischemic burden.  
Would prefer not to take him directly to the cath lab without further risk 
stratification.  Will plan to continue to treat heart failure.



PLAN:  

1.  Lasix 40 mg IV b.i.d. 

2.  Check digoxin level. 

3.  Hold Coumadin for consideration of left heart catheterization pending 
results of nuclear stress test.

4.  Continue Plavix 75 mg daily.

5.  Add aspirin 325 mg daily.

6.  Limited echocardiogram to assess ventricular function.

7.  Lexiscan nuclear stress test.

8.  Continue outpatient dose of metoprolol, spironolactone,digoxin and losartan.

9.  We will continue to follow. 



Two hours were spent reviewing his records, consulting with his hospitalist 
physician and RN, coupled with speaking to the outside providers regarding his 
medical history.





Job #:  [id number]

MTDD

## 2017-05-13 NOTE — ECHO
[id number]

[id number]



+---------------------------------------------------+      [address]

:                                                   :       [place] [address]

:                                                   :           [phone]

+---------------------------------------------------+       Fax [phone]



                       Adult Echocardiographic Report



+----------------------------------------------------------------------------

------+

:Name: HAL GARCIA CStudy Date: 2017 11:56 AM                       

      :

:MRN: [id number]       Hospital Admission Number: [id number] Locatio

n: 142:

:: 1940       Gender: Male                           Height: 72 in  

      :

:Age: 76 yrs           Race: WH,UN,U                          Weight: 188 lb 

      :

:Reason For Study: Postitional CP/nausea/orthopnea/eval                      

      :

:EF/pericardial effusion                                      BSA: 2.1 meters

2     :

+----------------------------------------------------------------------------

------+

MMode/2D Measurements \T\ Calculations

LVIDd: 8.4 cm                 EDV(Teich): 389.3 ml



Normal Measurement Values:

+----------------------------------------------------------------------------

----------------------------------+

:LVIDd (3.5-5.7cm)    IVSd (0.6-1.1cm)     LVPWd (0.6-1.1cm)     Aortic Root 

(2.0-3.7cm)Left Atrium (1.5-4.0cm):

:LV Vol(d) (76-115ml) LV Vol(s) (29-48ml)  Ejec Fraction (50-65%)PV Vinny (0.6-

1.2m/s)    TV Vinny (0.4-1.0m/s)    :

:MV E Vinny (0.8-1.0m/s)MV A Vinny (0.3-1.0m/s)LVOT Vinny (0.7-1.2m/s) Asc Ao Vinny (

0.9-1.8m/s)                       :

+----------------------------------------------------------------------------

----------------------------------+



Left Ventricle

The left ventricle is severely dilated. EF estimate is 20%. There is severe

global hypokinesis of the left ventricle.









Mitral Valve

The mitral valve is normal in structure and function. There is severe mitral

regurgitation.



Aortic Valve

The aortic valve is trileaflet. Moderate aortic regurgitation.



Pericardium/Pleural

There is no pericardial effusion.



Conclusion

Limited 2-D echo.

Compared to prior study, there is no significant change.

EF estimate is 20%.

The left ventricle is severely dilated.

There is severe mitral regurgitation.

There is no pericardial effusion.

Moderate aortic regurgitation.

There is severe global hypokinesis of the left ventricle.

Compared to prior study, there is no significant change.





_____________________________________________________________________________





Final Reading Physician:

                        Femi Carbajal  electronically signed on 2017

                        02:19 PM

Ordering Physician: Emely Arshad

Performed By: Gissel Lopez, SHARI

## 2017-05-14 VITALS
DIASTOLIC BLOOD PRESSURE: 73 MMHG | SYSTOLIC BLOOD PRESSURE: 129 MMHG | OXYGEN SATURATION: 98 % | HEART RATE: 76 BPM | TEMPERATURE: 98 F

## 2017-05-14 RX ADMIN — CLOPIDOGREL BISULFATE SCH MG: 75 TABLET, FILM COATED ORAL at 08:35

## 2017-05-14 RX ADMIN — METOPROLOL SUCCINATE SCH MG: 100 TABLET, EXTENDED RELEASE ORAL at 08:35

## 2017-05-14 RX ADMIN — ASPIRIN SCH MG: 325 TABLET, FILM COATED ORAL at 08:35

## 2017-05-14 RX ADMIN — LOSARTAN POTASSIUM SCH MG: 25 TABLET, FILM COATED ORAL at 08:36

## 2017-05-14 RX ADMIN — ALLOPURINOL SCH MG: 300 TABLET ORAL at 08:34

## 2017-05-14 RX ADMIN — SPIRONOLACTONE SCH MG: 25 TABLET, FILM COATED ORAL at 08:35

## 2017-05-14 RX ADMIN — FUROSEMIDE SCH MG: 10 INJECTION, SOLUTION INTRAMUSCULAR; INTRAVENOUS at 08:34

## 2017-05-14 RX ADMIN — FUROSEMIDE SCH: 10 INJECTION, SOLUTION INTRAMUSCULAR; INTRAVENOUS at 15:21

## 2017-05-14 NOTE — PDCARPN
Cardiology Progress Note


Chief Complaint: 





Mr. Elliott is doing well this AM. No complaints over night. No new episodes of 

chest pain. Remains in rate controlled afib. Note pauses of up to 2.5 seconds 

at night on telemetry. Asymptomatic. Final nuclear results pending, rest 

imaging this AM.  


Assessment/Plan: 


Assessment:


1. Atypical chest pain


2. Cardiomyopathy with LVEF 20%


3. CAD with pci to LAD in November 2016


4. Severe MR


5. Chronic Afib


6. Medical non compliance 


Plan:


-hold lovenox this am 


-rest nuclear images this Am


-discussed with pt the importance of compliance with medications





05/14/17 09:22





Objective: 





 Vital Signs (8 Hrs)











  Temp Pulse Resp BP Pulse Ox


 


 05/14/17 08:36     117/63 


 


 05/14/17 08:35   87   117/63 05/14/17 07:10  36.6 C  87  18  117/63  93


 


 05/14/17 04:00  36.5 C  91  18  96/73 L  93








 Intake/Output (24 Hrs)











 05/13/17 05/14/17 05/15/17





 05:59 05:59 05:59


 


Intake Total  350 


 


Output Total  1450 


 


Balance  -1100 


 


Intake:   


 


  Oral (ml)  350 


 


Output:   


 


  Urine (ml)  1450 


 


    Urinal  1450 


 


Other:   


 


  Weight  81.1 kg 


 


  Number of Voids   


 


    Urinal  1 











Result Diagrams: 


 05/13/17 04:58





 05/13/17 04:58





- Physical Exam


Constitutional: WDWN


Eyes: PERRL


Ears, Nose, Mouth, Throat: moist mucous membranes


Cardiovascular: systolic murmur, irregularly irregular


Peripheral Pulses: 2+: carotid (R), carotid (L)


Respiratory: clear to auscultate bilat


Skin: no rashes


Musculoskeletal: no muscular tenderness


Neurologic: AAOx3, CN II-XII grossly intact


Psychiatric: cooperative, interactive





ICD10 Worksheet


Patient Problems: 


 Problems











Problem Status Onset


 


Chest pain Acute  


 


Troponin level elevated Acute  


 


Acute decompensated heart failure Acute  


 


Acute exacerbation of CHF (congestive heart failure) Acute  


 


Atrial fibrillation with rapid ventricular response Acute  


 


Cardiomyopathy Acute  


 


Chest pain Acute  


 


Chest pain at rest Acute  


 


Chest pain in adult Acute  


 


Confusion with nonfocal neurological examination Acute  


 


Congestive heart failure Acute  


 


Constipation Acute  


 


Hematuria Acute  


 


Near syncope Acute  


 


Nephrolithiasis Acute  


 


Ureterolithiasis Acute

## 2017-05-15 ENCOUNTER — HOSPITAL ENCOUNTER (EMERGENCY)
Dept: HOSPITAL 80 - FED | Age: 77
Discharge: HOME | End: 2017-05-15
Payer: COMMERCIAL

## 2017-05-15 VITALS — DIASTOLIC BLOOD PRESSURE: 71 MMHG | HEART RATE: 74 BPM | OXYGEN SATURATION: 96 % | SYSTOLIC BLOOD PRESSURE: 100 MMHG

## 2017-05-15 VITALS — TEMPERATURE: 97.9 F | RESPIRATION RATE: 16 BRPM

## 2017-05-15 DIAGNOSIS — I12.9: ICD-10-CM

## 2017-05-15 DIAGNOSIS — R10.9: Primary | ICD-10-CM

## 2017-05-15 DIAGNOSIS — I50.9: ICD-10-CM

## 2017-05-15 DIAGNOSIS — Z90.49: ICD-10-CM

## 2017-05-15 DIAGNOSIS — I25.10: ICD-10-CM

## 2017-05-15 DIAGNOSIS — N18.9: ICD-10-CM

## 2017-05-15 NOTE — EDPHY
H & P


HPI/ROS: 





CHIEF COMPLAINT: Flank pain, chest pain, dizziness. 





HISTORY OF PRESENT ILLNESS: The patient is a 77 y/o male, with 12 previous ED 

visits in the last year, complaining of ongoing left lateral flank pain, 

substernal chest pain, shortness of breath, and dizziness. He has a significant 

cardiac history with NHHA class 3 CHF, chronic Afib, cardiomyopathy, and PCI to 

LAD 11/16. He was admitted to [facility] on 5/12 with chest pain, discharged yesterday 

after cardiology consultation and nuclear stress testing that did not show 

ischemia.            





He recently underwent a stenting procedure  for nephrolithiasis. 


He says his dizziness is causing difficulty walking and is not aggravated by 

turning his head. It is not a spinning sensation. He denies headache, visual 

changes, loss of hearing, weakness or numbness.





All of his current complaints are similar to what he was experiencing when he 

was admitted on 5/12. 





 He has a kidney stone surgery scheduled for Thursday this week.





REVIEW OF SYSTEMS:





A ten point review of systems was performed and is negative with the exception 

of the items mentioned in the HPI.








Source: Patient





- Personal History


Tetanus Vaccine Date: 3/2010





- Medical/Surgical History


Hx Asthma: No


Hx Chronic Respiratory Disease: No


Hx Diabetes: No


Hx Cardiac Disease: Yes


Hx Renal Disease: Yes


Hx Cirrhosis: No


Hx Alcoholism: No


Hx HIV/AIDS: No


Hx Splenectomy or Spleen Trauma: No


Other PMH: 1. Chronic systolic heart failure ejection fraction of 25%.  2. 

Coronary artery disease status post stenting on 11/16/2016.  3. Chronic atrial 

fibrillation.  4. Hypertension.  5. Chronic renal insufficiency.  6. 

Nephrolithiasis with ureteral stent placement November of 2016.  7. Gout.  8. 

Right total knee arthroplasty.  9. Appendectomy





- Social History


Smoking Status: Never smoked


Additional Social History: 





No tobacco, alcohol, illicit drug use.  He lives in a trailer in [place], [place].  





PCP through [facility].  





- Physical Exam


Exam: 





General Appearance:  Alert.  Vital signs reviewed.  /83.


Eyes:  Pupils equal and round, no conjunctival injection, no discharge. 

Anicteric.


ENT, Mouth:  Mucous membranes are moist, no oropharyngeal erythema or edema.


Neck:  No lymphadenopathy, supple. No JVD.


Respiratory:  Lungs are clear to auscultation; no wheezes, rales, or rhonchi.


Cardiovascular:  irregularly irregular; 3/6 murmur, no rub, no gallop.


Gastrointestinal:  Abdomen is soft and nontender, no masses or organomegaly, 

bowel sounds normal.


Skin:  Warm and dry, no rashes on exposed skin, normal color.


Back:  Nontender to palpation over the thoracolumbar spine. No CVAT.


Extremities:  No lower extremity edema, no calf tenderness or swelling.


Neurological:  Alert and oriented.  Moving all four extremities easily and 

equally.


Cranial nerves II through XII are examined and are intact (visual acuity not 

tested).  Strength is 5 over 5 bilaterally with testing of all major motor 

groups.  Sensation is intact to light touch over all 4 extremities.  Deep 

tendon reflexes are 2+ in the biceps and knees bilaterally.  Gait is normal.  

Finger-to-nose is performed accurately.


Psychiatric:  Normal affect.


Constitutional: 


 Initial Vital Signs











Temperature (C)  36.6 C   05/15/17 09:12


 


Heart Rate  94   05/15/17 09:12


 


Respiratory Rate  16   05/15/17 09:12


 


Blood Pressure  107/83 H  05/15/17 09:12


 


O2 Sat (%)  98   05/15/17 09:12








 











O2 Delivery Mode               Room Air














Allergies/Adverse Reactions: 


 





atorvastatin calcium [From Lipitor] Allergy (Severe, Verified 04/19/17 06:58)


 Other-Enter Comments


haloperidol [From Haldol] Allergy (Verified 04/19/17 06:58)


 


simvastatin Allergy (Verified 05/12/17 18:42)


 








Home Medications: 














 Medication  Instructions  Recorded


 


Acetaminophen [Tylenol 325mg (*)] 325 - 650 mg PO Q4H PRN 05/12/17


 


Allopurinol [Allopurinol 300  mg PO DAILY 05/12/17





(RX)]  


 


Clopidogrel Bisulfate [Plavix (*)] 75 mg PO DAILY 05/12/17


 


Digoxin [Lanoxin 125 mcg (RX)] 125 mcg PO HS 05/12/17


 


Furosemide [Lasix 20 MG (*)] 20 mg PO DAILY@1600 05/12/17


 


Furosemide [Lasix 40 MG (*)] 40 mg PO DAILY 05/12/17


 


Losartan Potassium [Cozaar 25 mg 12.5 mg PO DAILY 05/12/17





(*)]  


 


Metoprolol Succinate Xr [Toprol Xl 100 mg PO DAILY 05/12/17





100 mg (*)]  


 


Nitroglycerin [Nitrostat 0.4 mg 0.4 mg SL AD PRN 05/12/17





(*)]  


 


Spironolactone [Aldactone 25 MG 12.5 mg PO DAILY 05/12/17





(*)]  


 


Warfarin Sodium [Coumadin 5MG (*)] 5 mg PO SUMOWETHFR 05/12/17


 


Warfarin Sodium [Coumadin 7.5MG 7.5 mg PO TUSA 05/12/17





(*)]  


 


oxyCODONE IR [Oxycodone Ir (*)] 2.5 - 5 mg PO Q4H PRN 05/12/17














Medical Decision Making





- Diagnostics


EKG Interpretation: 





The 12 lead EKG was interpreted by myself. EKG shows atrial fibrillation rate 91

, RBBB, LPFB. See hard copy and/or "tracemaster" electronic copy for 

interpretation.


Imaging: I viewed and interpreted images myself


ED Course/Re-evaluation: 





Patient placed on cardiac monitor. EKG reviewed.





Patient not experiencing chest pain or shortness of breath at the time of my 

evaluation.  He complains of vague dizziness.  He has a normal neurologic exam, 

no headache, normal gait.  It seems that the dizziness has passed.  He was 

hospitalized 5/12 to 5/14 with similar complaints and underwent a thorough 

evaluation.  I do not feel that further cardiac evaluation is warranted at this 

time. I do not feel that there is an urgent medical condition such as ACS, 

worsening CHF, infections, or stroke.  He has ongoing flank pain, likely 

related to his ureteral stent, and has an appointment in three days with 

urology for treatment of ureterolithiasis.  He has arranged transportation to 

this appointment and plans to keep it.  I do not suspect UTI/pyelo.





Case management met with him and discussed repatriating to [place] facility.  











Departure





- Departure


Disposition: Home, Routine, Self-Care


Clinical Impression: 


 Flank pain





Condition: Good


Instructions:  Flank Pain (ED)


Additional Instructions: 


Follow up with your urologist as scheduled. Return for worsening of condition.





Keep your apointment tomorrow.


Referrals: 


Storm Lafleur MD [Medical Doctor] - As per Instructions


Report Scribed for: Rosa Isela Meraz


Report Scribed by: Justine Cuellar


Date of Report: 05/15/17


Time of Report: 09:37


Physician Review and Approval Statement: 





05/15/17 09:14


Portions of this note were transcribed by the medical scribe.  I, Dr. Rosa Isela Meraz, personally performed the history, physical exam, and medical decision-

making; and confirmed the accuracy of the information in the transcribed note.

## 2017-05-15 NOTE — CPEKG
Heart Rate: 91

RR Interval: 659

QRSD Interval: 172

QT Interval: 440

QTC Interval: 542

QRS Axis: 24

T Wave Axis: -30

EKG Severity - ABNORMAL ECG -

EKG Impression: ATRIAL FIBRILLATION, V-RATE 

EKG Impression: PAIRED VENTRICULAR PREMATURE COMPLEXES

EKG Impression: RBBB AND LPFB

Electronically Signed By: Rosa Isela Meraz 15-May-2017 15:22:20

## 2017-05-20 ENCOUNTER — HOSPITAL ENCOUNTER (EMERGENCY)
Dept: HOSPITAL 80 - FED | Age: 77
Discharge: HOME | End: 2017-05-20
Payer: COMMERCIAL

## 2017-05-20 ENCOUNTER — HOSPITAL ENCOUNTER (OUTPATIENT)
Dept: HOSPITAL 80 - FED | Age: 77
Setting detail: OBSERVATION
Discharge: HOME | End: 2017-05-20
Attending: INTERNAL MEDICINE | Admitting: INTERNAL MEDICINE
Payer: COMMERCIAL

## 2017-05-20 VITALS
HEART RATE: 96 BPM | OXYGEN SATURATION: 93 % | RESPIRATION RATE: 15 BRPM | SYSTOLIC BLOOD PRESSURE: 117 MMHG | TEMPERATURE: 98.2 F | DIASTOLIC BLOOD PRESSURE: 82 MMHG

## 2017-05-20 VITALS
RESPIRATION RATE: 16 BRPM | DIASTOLIC BLOOD PRESSURE: 83 MMHG | OXYGEN SATURATION: 95 % | HEART RATE: 85 BPM | TEMPERATURE: 96.9 F | SYSTOLIC BLOOD PRESSURE: 118 MMHG

## 2017-05-20 DIAGNOSIS — I13.0: ICD-10-CM

## 2017-05-20 DIAGNOSIS — R55: Primary | ICD-10-CM

## 2017-05-20 DIAGNOSIS — Z95.5: ICD-10-CM

## 2017-05-20 DIAGNOSIS — I25.10: ICD-10-CM

## 2017-05-20 DIAGNOSIS — R94.31: ICD-10-CM

## 2017-05-20 DIAGNOSIS — R42: Primary | ICD-10-CM

## 2017-05-20 DIAGNOSIS — Z91.19: ICD-10-CM

## 2017-05-20 DIAGNOSIS — F41.9: ICD-10-CM

## 2017-05-20 DIAGNOSIS — Z79.01: ICD-10-CM

## 2017-05-20 DIAGNOSIS — Z96.651: ICD-10-CM

## 2017-05-20 DIAGNOSIS — Z96.0: ICD-10-CM

## 2017-05-20 DIAGNOSIS — I48.2: ICD-10-CM

## 2017-05-20 DIAGNOSIS — M10.9: ICD-10-CM

## 2017-05-20 DIAGNOSIS — I42.9: ICD-10-CM

## 2017-05-20 DIAGNOSIS — I50.9: ICD-10-CM

## 2017-05-20 DIAGNOSIS — I50.22: ICD-10-CM

## 2017-05-20 DIAGNOSIS — I10: ICD-10-CM

## 2017-05-20 DIAGNOSIS — N20.0: ICD-10-CM

## 2017-05-20 DIAGNOSIS — N18.9: ICD-10-CM

## 2017-05-20 DIAGNOSIS — Z82.49: ICD-10-CM

## 2017-05-20 LAB
% IMMATURE GRANULYOCYTES: 0.3 % (ref 0–1.1)
% IMMATURE GRANULYOCYTES: 0.3 % (ref 0–1.1)
% IMMATURE GRANULYOCYTES: 0.4 % (ref 0–1.1)
ABSOLUTE IMMATURE GRANULOCYTES: 0.02 10^3/UL (ref 0–0.1)
ABSOLUTE IMMATURE GRANULOCYTES: 0.02 10^3/UL (ref 0–0.1)
ABSOLUTE IMMATURE GRANULOCYTES: 0.03 10^3/UL (ref 0–0.1)
ABSOLUTE NRBC COUNT: 0 10^3/UL (ref 0–0.01)
ADD DIFF?: NO
ADD MORPH?: NO
ADD SCAN?: NO
ALBUMIN SERPL-MCNC: 3.7 G/DL (ref 3.5–5)
ALP SERPL-CCNC: 93 IU/L (ref 38–126)
ALT SERPL-CCNC: 26 IU/L (ref 21–72)
ANION GAP SERPL CALC-SCNC: 15 MEQ/L (ref 8–16)
ANION GAP SERPL CALC-SCNC: 9 MEQ/L (ref 8–16)
ANION GAP SERPL CALC-SCNC: 9 MEQ/L (ref 8–16)
APTT BLD: 34.8 SEC (ref 23–38)
APTT BLD: 35.4 SEC (ref 23–38)
AST SERPL-CCNC: 19 IU/L (ref 17–59)
ATYPICAL LYMPHOCYTE FLAG: 0 (ref 0–99)
ATYPICAL LYMPHOCYTE FLAG: 10 (ref 0–99)
ATYPICAL LYMPHOCYTE FLAG: 10 (ref 0–99)
BILIRUB SERPL-MCNC: 0.6 MG/DL (ref 0.1–1.4)
BILIRUBIN-CONJUGATED: 0.3 MG/DL (ref 0–0.5)
BILIRUBIN-UNCONJUGATED: 0.3 MG/DL (ref 0–1.1)
CALCIUM SERPL-MCNC: 8.8 MG/DL (ref 8.5–10.4)
CALCIUM SERPL-MCNC: 9 MG/DL (ref 8.5–10.4)
CALCIUM SERPL-MCNC: 9.4 MG/DL (ref 8.5–10.4)
CHLORIDE SERPL-SCNC: 103 MEQ/L (ref 97–110)
CHLORIDE SERPL-SCNC: 105 MEQ/L (ref 97–110)
CHLORIDE SERPL-SCNC: 106 MEQ/L (ref 97–110)
CO2 SERPL-SCNC: 22 MEQ/L (ref 22–31)
CO2 SERPL-SCNC: 23 MEQ/L (ref 22–31)
CO2 SERPL-SCNC: 24 MEQ/L (ref 22–31)
COLOR UR: (no result)
CREAT SERPL-MCNC: 1.3 MG/DL (ref 0.7–1.3)
CREAT SERPL-MCNC: 1.5 MG/DL (ref 0.7–1.3)
CREAT SERPL-MCNC: 1.5 MG/DL (ref 0.7–1.3)
CREATINE KINASE-MB FRACTION: 1.06 NG/ML (ref 0–3.19)
CREATINE KINASE-MB FRACTION: 1.13 NG/ML (ref 0–3.19)
ERYTHROCYTE [DISTWIDTH] IN BLOOD BY AUTOMATED COUNT: 14.8 % (ref 11.5–15.2)
ERYTHROCYTE [DISTWIDTH] IN BLOOD BY AUTOMATED COUNT: 15 % (ref 11.5–15.2)
ERYTHROCYTE [DISTWIDTH] IN BLOOD BY AUTOMATED COUNT: 15.2 % (ref 11.5–15.2)
FRAGMENT RBC FLAG: 0 (ref 0–99)
GFR SERPL CREATININE-BSD FRML MDRD: 45 ML/MIN/{1.73_M2}
GFR SERPL CREATININE-BSD FRML MDRD: 45 ML/MIN/{1.73_M2}
GFR SERPL CREATININE-BSD FRML MDRD: 54 ML/MIN/{1.73_M2}
GLUCOSE SERPL-MCNC: 106 MG/DL (ref 70–100)
GLUCOSE SERPL-MCNC: 110 MG/DL (ref 70–100)
GLUCOSE SERPL-MCNC: 91 MG/DL (ref 70–100)
HCT VFR BLD CALC: 39.2 % (ref 40–51)
HCT VFR BLD CALC: 40.2 % (ref 40–51)
HCT VFR BLD CALC: 45.5 % (ref 40–51)
HGB BLD-MCNC: 13 G/DL (ref 13.7–17.5)
HGB BLD-MCNC: 13.5 G/DL (ref 13.7–17.5)
HGB BLD-MCNC: 15.5 G/DL (ref 13.7–17.5)
INR PPP: 1.22 (ref 0.83–1.16)
INR PPP: 1.26 (ref 0.83–1.16)
INR PPP: 1.31 (ref 0.83–1.16)
LEFT SHIFT FLG: 0 (ref 0–99)
LIPEMIA HEMOLYSIS FLAG: 80 (ref 0–99)
LIPEMIA HEMOLYSIS FLAG: 80 (ref 0–99)
LIPEMIA HEMOLYSIS FLAG: 90 (ref 0–99)
MAGNESIUM SERPL-MCNC: 1.8 MG/DL (ref 1.6–2.3)
MAGNESIUM SERPL-MCNC: 1.8 MG/DL (ref 1.6–2.3)
MCH RBC BLDCO QN: 31 PG (ref 27.9–34.1)
MCH RBC BLDCO QN: 31.2 PG (ref 27.9–34.1)
MCH RBC BLDCO QN: 31.5 PG (ref 27.9–34.1)
MCHC RBC AUTO-ENTMCNC: 33.2 G/DL (ref 32.4–36.7)
MCHC RBC AUTO-ENTMCNC: 33.6 G/DL (ref 32.4–36.7)
MCHC RBC AUTO-ENTMCNC: 34.1 G/DL (ref 32.4–36.7)
MCV RBC AUTO: 92.5 FL (ref 81.5–99.8)
MCV RBC AUTO: 92.8 FL (ref 81.5–99.8)
MCV RBC AUTO: 93.6 FL (ref 81.5–99.8)
NITRITE UR QL STRIP: NEGATIVE
NRBC-AUTO%: 0 % (ref 0–0.2)
PH UR STRIP: 6 [PH] (ref 5–7.5)
PLATELET # BLD: 147 10^3/UL (ref 150–400)
PLATELET # BLD: 154 10^3/UL (ref 150–400)
PLATELET # BLD: 182 10^3/UL (ref 150–400)
PLATELET CLUMPS FLAG: 0 (ref 0–99)
PLATELET CLUMPS FLAG: 10 (ref 0–99)
PLATELET CLUMPS FLAG: 10 (ref 0–99)
PMV BLD AUTO: 10 FL (ref 8.7–11.7)
PMV BLD AUTO: 9.8 FL (ref 8.7–11.7)
PMV BLD AUTO: 9.9 FL (ref 8.7–11.7)
POTASSIUM SERPL-SCNC: 3.6 MEQ/L (ref 3.5–5.2)
POTASSIUM SERPL-SCNC: 3.7 MEQ/L (ref 3.5–5.2)
POTASSIUM SERPL-SCNC: 4 MEQ/L (ref 3.5–5.2)
PROT SERPL-MCNC: 6.4 G/DL (ref 6.3–8.2)
PROTHROMBIN TIME: 15.4 SEC (ref 12–15)
PROTHROMBIN TIME: 15.8 SEC (ref 12–15)
PROTHROMBIN TIME: 16.3 SEC (ref 12–15)
RBC # BLD AUTO: 4.19 10^6/UL (ref 4.4–6.38)
RBC # BLD AUTO: 4.33 10^6/UL (ref 4.4–6.38)
RBC # BLD AUTO: 4.92 10^6/UL (ref 4.4–6.38)
RBC #/AREA URNS HPF: (no result) /HPF (ref 0–3)
SODIUM SERPL-SCNC: 137 MEQ/L (ref 134–144)
SODIUM SERPL-SCNC: 138 MEQ/L (ref 134–144)
SODIUM SERPL-SCNC: 141 MEQ/L (ref 134–144)
SP GR UR STRIP: 1.01 (ref 1–1.03)
TROPONIN I SERPL-MCNC: 0.06 NG/ML (ref 0–0.03)
WBC #/AREA URNS HPF: (no result) /HPF (ref 0–3)

## 2017-05-20 PROCEDURE — 93005 ELECTROCARDIOGRAM TRACING: CPT

## 2017-05-20 PROCEDURE — 71010: CPT

## 2017-05-20 PROCEDURE — 76770 US EXAM ABDO BACK WALL COMP: CPT

## 2017-05-20 PROCEDURE — G0378 HOSPITAL OBSERVATION PER HR: HCPCS

## 2017-05-20 NOTE — CPEKG
Heart Rate: 84

RR Interval: 714

QRSD Interval: 174

QT Interval: 444

QTC Interval: 525

QRS Axis: 76

T Wave Axis: 10

EKG Severity - ABNORMAL ECG -

EKG Impression: ATRIAL FIBRILLATION, V-RATE 

EKG Impression: MULTIFORM VENTRICULAR PREMATURE COMPLEXES

EKG Impression: RIGHT BUNDLE BRANCH BLOCK

EKG Impression: BORDERLINE ST DEPRESSION, LATERAL LEADS

Electronically Signed By: Steven Herbert 20-May-2017 06:53:33

## 2017-05-20 NOTE — CPEKG
Heart Rate: 93

RR Interval: 645

QRSD Interval: 170

QT Interval: 448

QTC Interval: 558

QRS Axis: 65

T Wave Axis: -3

EKG Severity - ABNORMAL ECG -

EKG Impression: ATRIAL FIBRILLATION, V-RATE 

EKG Impression: VENTRICULAR PREMATURE COMPLEX

EKG Impression: RBBB AND LPFB

EKG Impression: BORDERLINE ST DEPRESSION, LATERAL LEADS

Electronically Signed By: Judy Knapp 20-May-2017 21:06:43

## 2017-05-20 NOTE — PDGENHP
History and Physical





- Chief Complaint


anxiety





- History of Present Illness


Pt is 76/M with nonischemic cardiomyopathy (EF 15-20%), CAD, Afib on systemic 

anticoagulation, HTN, multiple ED/[facility] visits admissions for chest pain/chf 

exacerbations due to presumed medication noncompliance who presents to the ED 

today with complaint of anxiety, dizziness and shortness of breath. Patient was 

admitted to [facility] 1 week ago for dyspnea and chest pain, had an unremarkable 

nuclear stress test and and was discharged home on , stressing the 

importance of medication compliance. He was seen again in the ED on 5/15 also 

for dizziness and was discharged home when symptoms spontaneously resolved. He 

presents today complaining of shortness of breath when trying to lie down flat 

the previous night. In addition, he also reports dizziness/lightheadedness 

intermittently throughout the day. He currently denies any chest pain, 

palpitations. He also reports complete compliance with all his prescribed 

medications.





In addition to his cardiac history and symptoms, patient was recently diagnosed 

with nephrolithiasis and had a ureteral stent replaced on . He is 

complaining of residual hematuria and some flank discomfort since this 

procedure. He denies dysuria, fever, chills. 





On arrival to the ED, patient is afebrile and hemodynamically stable. Labs 

reveal normal cbc, subtherapeutic INR, indeterminant troponin and elevated BNP. 

EKG is abnormal with lateral ST depressions, however, this appears unchanged 

from previous EKGs. CXR does not show significant volume overload. He was given 

a dose of IV lasix and admitted to the hospitalist service for further 

management. 





History Information





- Allergies/Home Medication List


Allergies/Adverse Reactions: 








atorvastatin calcium [From Lipitor] Allergy (Severe, Verified 17 06:58)


 Other-Enter Comments


haloperidol [From Haldol] Allergy (Verified 17 06:58)


 


simvastatin Allergy (Verified 17 18:42)


 





Home Medications: 








Acetaminophen [Tylenol 325mg (*)] 325 - 650 mg PO Q4H PRN 17 [Last Taken 

Unknown]


Allopurinol [Allopurinol 300 MG (RX)] 150 mg PO DAILY 17 [Last Taken ]


Clopidogrel Bisulfate [Plavix (*)] 75 mg PO DAILY 17 [Last Taken 17]


Digoxin [Lanoxin 125 mcg (RX)] 125 mcg PO HS 17 [Last Taken 17]


Furosemide [Lasix 20 MG (*)] 20 mg PO DAILY@1600 17 [Last Taken 17]


Furosemide [Lasix 40 MG (*)] 40 mg PO DAILY 17 [Last Taken 17]


Losartan Potassium [Cozaar 25 mg (*)] 12.5 mg PO DAILY 17 [Last Taken ]


Metoprolol Succinate Xr [Toprol Xl 100 mg (*)] 100 mg PO DAILY 17 [Last 

Taken 17]


Nitroglycerin [Nitrostat 0.4 mg (*)] 0.4 mg SL AD PRN 17 [Last Taken 

Unknown]


Spironolactone [Aldactone 25 MG (*)] 12.5 mg PO DAILY 17 [Last Taken ]


Warfarin Sodium [Coumadin 5MG (*)] 5 mg PO SUMOWETHFR 17 [Last Taken ]


Warfarin Sodium [Coumadin 7.5MG (*)] 7.5 mg PO TUSA 17 [Last Taken ]


oxyCODONE IR [Oxycodone Ir (*)] 2.5 - 5 mg PO Q4H PRN 17 [Last Taken 

Unknown]





I have personally reviewed and updated: family history, medical history, social 

history, surgical history





- Past Medical History


atrial fibrillation (  Permanent), coronary artery disease ( with LAD stent 

2016, ischemic cardiomyopathy), CHF ( systolic with ejection 

fraction 25%)


Additional medical history: nonobstructive CAD.  Afib on systemic 

anticoagulation.  HTN.  gout.  nephrolithiasis with recent stent placed.  

history of left atrial thrombus.  Cognitive impairment





- Surgical History


Additional surgical history: R total knee replacement.  appendectomy.  Recent 

ureteral stent placement.  LAD stent 2016





- Family History


Additional family history: Pt reports every male family member has  of CAD/

heart disease





- Social History


Smoking Status: Never smoked


Alcohol Use: None


Drug Use: None


Additional social history: Retired lives alone, reportedly is independent in 

ADLs.





Review of Systems


ROS: 10pt was reviewed & negative except for what was stated in HPI & below





Physical Exam














Temp Pulse Resp BP Pulse Ox


 


 36.5 C   83   16   108/80   91 L


 


 17 00:27  17 02:21  17 02:21  17 02:21  17 02:21











Constitutional: no apparent distress, appears nourished, not in pain


Eyes: PERRL, anicteric sclera, EOMI


Ears, Nose, Mouth, Throat: moist mucous membranes, hearing normal, ears appear 

normal, no oral mucosal ulcers


Cardiovascular: no murmur, rub, or gallop, irregularly irregular, No JVD, No 

edema


Peripheral Pulses: 2+: dorsalis-pedis (R), dorsalis-pedis (L)


Respiratory: no respiratory distress, no rales or rhonchi, clear to auscultation


Gastrointestinal: normoactive bowel sounds, soft, non-tender abdomen, no 

palpable masses


Genitourinary: no bladder fullness, no bladder tenderness


Skin: warm, normal color, no rashes or abrasions, no fluctuance, no induration, 

No mottled


Musculoskeletal: full muscle strength, no muscle tenderness, normal joint ROM, 

no joint effusions


Neurologic: AAOx3, sensation intact bilaterally, CN II-XII Intact, No weakness, 

No numbness, No pronator drift


Psychiatric: interacting appropriately, not anxious, not encephalopathic, poor 

insight





Lab Data & Imaging Review





 17 00:55





 17 00:55














WBC  8.11 10^3/uL (3.80-9.50)   17  00:55    


 


RBC  4.19 10^6/uL (4.40-6.38)  L  17  00:55    


 


Hgb  13.0 g/dL (13.7-17.5)  L  17  00:55    


 


Hct  39.2 % (40.0-51.0)  L  17  00:55    


 


MCV  93.6 fL (81.5-99.8)   17  00:55    


 


MCH  31.0 pg (27.9-34.1)   17  00:55    


 


MCHC  33.2 g/dL (32.4-36.7)   17  00:55    


 


RDW  15.2 % (11.5-15.2)   17  00:55    


 


Plt Count  154 10^3/uL (150-400)   17  00:55    


 


MPV  9.8 fL (8.7-11.7)   17  00:55    


 


Neut % (Auto)  63.1 % (39.3-74.2)   17  00:55    


 


Lymph % (Auto)  27.1 % (15.0-45.0)   17  00:55    


 


Mono % (Auto)  7.0 % (4.5-13.0)   17  00:55    


 


Eos % (Auto)  1.5 % (0.6-7.6)   17  00:55    


 


Baso % (Auto)  0.9 % (0.3-1.7)   17  00:55    


 


Nucleat RBC Rel Count  0.0 % (0.0-0.2)   17  00:55    


 


Absolute Neuts (auto)  5.12 10^3/uL (1.70-6.50)   17  00:55    


 


Absolute Lymphs (auto)  2.20 10^3/uL (1.00-3.00)   17  00:55    


 


Absolute Monos (auto)  0.57 10^3/uL (0.30-0.80)   17  00:55    


 


Absolute Eos (auto)  0.12 10^3/uL (0.03-0.40)   17  00:55    


 


Absolute Basos (auto)  0.07 10^3/uL (0.02-0.10)   17  00:55    


 


Absolute Nucleated RBC  0.00 10^3/uL (0-0.01)   17  00:55    


 


Immature Gran %  0.4 % (0.0-1.1)   17  00:55    


 


Immature Gran #  0.03 10^3/uL (0.00-0.10)   17  00:55    


 


PT  15.8 SEC (12.0-15.0)  H  17  00:55    


 


INR  1.26  (0.83-1.16)  H  17  00:55    


 


APTT  34.8 SEC (23.0-38.0)   17  00:55    


 


Sodium  137 mEq/L (134-144)   17  00:55    


 


Potassium  3.7 mEq/L (3.5-5.2)   17  00:55    


 


Chloride  106 mEq/L ()   17  00:55    


 


Carbon Dioxide  22 mEq/l (22-31)   17  00:55    


 


Anion Gap  9 mEq/L (8-16)   17  00:55    


 


BUN  43 mg/dL (7-23)  H  17  00:55    


 


Creatinine  1.5 mg/dL (0.7-1.3)  H  17  00:55    


 


Estimated GFR  45   17  00:55    


 


Glucose  110 mg/dL ()  H  17  00:55    


 


Calcium  8.8 mg/dL (8.5-10.4)   17  00:55    


 


Magnesium  1.8 mg/dL (1.6-2.3)   17  00:55    


 


Total Bilirubin  0.6 mg/dL (0.1-1.4)   17  00:55    


 


Conjugated Bilirubin  0.3 mg/dL (0.0-0.5)   17  00:55    


 


Unconjugated Bilirubin  0.3 mg/dL (0.0-1.1)   17  00:55    


 


AST  19 IU/L (17-59)   17  00:55    


 


ALT  26 IU/L (21-72)   17  00:55    


 


Alkaline Phosphatase  93 IU/L ()   17  00:55    


 


Creatine Kinase  38 IU/L (0-224)   17  00:55    


 


CK-MB (CK-2) Fraction  1.13 ng/mL (0-3.19)   17  00:55    


 


Troponin I  0.064 ng/mL (0-0.034)  H  17  00:55    


 


NT-Pro-B Natriuret Pep  16109 pg/mL (0-450)  H  17  00:55    


 


Total Protein  6.4 g/dL (6.3-8.2)   17  00:55    


 


Albumin  3.7 g/dL (3.5-5.0)   17  00:55    


 


Lipase  249.0 IU/L ()   17  00:55    


 


Urine Color  RED   17  02:00    


 


Urine Appearance  HAZY   17  02:00    


 


Urine pH  6.0  (5.0-7.5)   17  02:00    


 


Ur Specific Gravity  1.012  (1.002-1.030)   17  02:00    


 


Urine Protein  2+  (NEGATIVE)  H  17  02:00    


 


Urine Ketones  NEGATIVE  (NEGATIVE)   17  02:00    


 


Urine Blood  3+  (NEGATIVE)  H  17  02:00    


 


Urine Nitrate  NEGATIVE  (NEGATIVE)   17  02:00    


 


Urine Bilirubin  NEGATIVE  (NEGATIVE)   17  02:00    


 


Urine Urobilinogen  NEGATIVE EU (0.2-1.0)   17  02:00    


 


Ur Leukocyte Esterase  3+  (NEGATIVE)  H  17  02:00    


 


Urine RBC   /hpf (0-3)  H  17  02:00    


 


Urine WBC   /hpf (0-3)  H  17  02:00    


 


Ur Epithelial Cells  TRACE /lpf (NONE-1+)   17  02:00    


 


Urine Glucose  NEGATIVE  (NEGATIVE)   17  02:00    








Visualized and Interpreted Chest x-ray results: Yes


Chest X-Ray results: no infiltrate, other (no effusions or edema)


Visualized and Interpreted imaging results: Yes


Interpretation: Abd US: stent in ureter, no hydronephrosis, bilateral 

nephrolithiasis


Visualized and Interpreted EKG results: Yes


EKG additional interpertation: afib with RBBB and ST depression in V4-V6 (old)





Assessment & Plan


Assessment: 


Pt is 76/M with nonischemic cardiomyopathy (EF 15-20%), CAD, Afib on systemic 

anticoagulation, HTN, multiple ED/[facility] visits admissions for chest pain/chf 

exacerbations due to presumed medication noncompliance who presents to the ED 

with complaint of mild dyspnea and shortness of breath. ED evaluation reveals 

elevated BNP and indeterminant troponin. EKG shows afib, largely unchanged from 

prior EKGs. 








Plan: 





# dizziness


Patient is nonfocal on exam, and although his is a very poor history with poor 

insight, he does not appear encephalopathic. EKG shows RBBB and LPFB (old) but 

also shows many PVCs and afib. Dizziness may be related to arrhythmia vs 

underlying cardiomyopathy. Will rule out ACS and monitor on telemetry. Will not 

repeat TTE/Nuclear stress, which were just performed in most recent admission. 

Per previous notes, patient has not been fitted with an AICD for his 

cardiomyopathy due to medication noncompliance. If symptoms persist, consider 

neurology consultation. 





# chronic systolic CHF without acute exacerbation


Patient has NYHA class 3 systolic heart failure and his symptoms today appear 

to be his baseline chronic heart failure symptoms. His BNP is chronically 

elevated and currently not significantly above previously levels. CXR and exam 

also do not indicate significant fluid overload. Will give one dose of IV lasix 

and then continue his home PO chf medication regimen. 





# nephrolithiasis


Patient is s/p repeat ureteral stent placement on . UA shows hematuria and 

pyuria, however, patient has no clinical symptoms of UTI and does not meet SIRS/

sepsis criteria. No indication for antibiotics at this time. Abd US also 

confirms stent placement and is negative for obvious hydronephrosis. 





# Afib


Currently rate controlled with home meds, will continue. INR is subtherapeutic, 

will dose and trend.





# HTN


BP stable, cont home meds. 





# dispo: admit to observation status for dizziness





# gen:


Cardiac diet


DVT ppx: on systemic warfarin


Full Code

## 2017-05-20 NOTE — PDDCSUM
Discharge Summary


Discharge Summary: 





Dates of service 5/19-5/20/2017





Discharge diagnosis: 


# dizzyness


# chronic systolic heart failure


# nephrolithiasis


# medication non compliance


# a fib


# htn





Procedures/consultations: none





Hospital course by problem:


# dizzyness: resolved overnight after resuming home medications, no syncope or 

chest pain reported. He notes that he sometimes gets anxious at home and calls 

his pcp or cardiologist who recommend that he be brought in by ambulance, and 

that he often feels better immediately and regrets coming in. He states that at 

the moment he feels much better, ambulating w/o issue, eating well, eager to dc 

home. 


# chronic systolic heart failure: appears euvolemic, had nuc stress 1 week ago 

showing essentially stably low EF at 15%, will continue usual meds and 

encouraged him to maintain compliance


# nephrolithiasis: followed by Dr. Lainez, has f/u scheduled in coming weeks, no 

acute issues


# a fib/chronic: rate controlled, no change on ecg


# anxiety: leading to recurrent hospitalizations and ER visits, he recognizes 

anxiety plays a role in his repeat hospital stays. REcommend close f/u with his 

usual doctor to help minimize repeat hospitalizations





Dispo: dc home


f/u with PCP and Cardiologist at [facility]


> 35 minutes spent in care of this patient, more than half in face to face 

counseling regarding f/u care plans

## 2017-05-20 NOTE — EDPHY
H & P


Time Seen by Provider: 05/20/17 15:11


HPI/ROS: 


CHIEF COMPLAINT: Syncope





HISTORY OF PRESENT ILLNESS: This patient is an anticoagulated 76-year-old male 

with history of poorly controlled congestive heart failure who presents to the 

Emergency Department via EMS following a self-reported, unwitnessed episode of 

syncope and collapse around 1300 today. He has been seen in this facility 

frequently over the past few months for CHF exacerbations which often present 

with chest pain and dyspnea. He was most recently seen here today at 230 for 

complaints of dyspnea when he was admitted for an acute CHF exacerbation; he 

was discharged home this morning at 1000. He states that he was lying down when 

he felt dizzy at home; he then stood up and reportedly lost consciousness, 

falling down the stairs. Upon presentation this afternoon, he states that he 

"doesn't know what to do," and he is "afraid he will suffocate to death." He 

denies any injuries secondary to his fall today. No head, neck, or back trauma. 

He denies any focal medical complaints at this time. He lives alone but reports 

that his PCP is in the process of setting him up with an at-home nursing 

service.





REVIEW OF SYSTEMS:


Constitutional: No fever, no chills


Eyes: No visual changes


ENT: No sore throat


Respiratory: No cough, no shortness of breath


Cardiac: No chest pain


Gastrointestinal: No nausea, no vomiting, no abdominal pain


Genitourinary: No hematuria, no dysuria


Musculoskeletal: No leg pain or swelling


Skin: No rash


Neurological: No headache, no numbness, no weakness


Psychiatric: No depression





Past Medical/Surgical History: 


1. Atrial fibrillation, permanent (Coumadin)


2. CAD with stenting


3. CHF, systolic ejection fraction 25%


4. Hypertension


5. Nephrolithiasis with ureteral stent placed on 5/18


Social History: 


Lives alone, adult children living elsewhere.


Non-smoker.


Retired.


Smoking Status: Never smoked


Physical Exam: 


General Appearance: Alert, hard of hearing, appears anxious


Eyes: Pupils equal and round, no conjunctival pallor or injection


ENT, Mouth: Hard of hearing, mucous membranes moist


Neck: Normal inspection. NT, ROM without pain


Respiratory: normal inspection, NT, Lungs are clear to auscultation


Cardiovascular: Irregularly irregular rate and rhythm


Gastrointestinal:  Abdomen is soft and non-tender 


Neurological:  A&O, nonfocal exam


Skin:  Warm and dry, no signs of injury


Extremities:  Nontender, no pedal edema


Psychiatric: Anxious affect


Constitutional: 


 Initial Vital Signs











Temperature (C)  36.3 C   05/20/17 14:10


 


Heart Rate  80   05/20/17 14:10


 


Respiratory Rate  16   05/20/17 14:10


 


Blood Pressure  94/77 L  05/20/17 14:10


 


O2 Sat (%)  97   05/20/17 14:10








 











O2 Delivery Mode               Room Air














Allergies/Adverse Reactions: 


 





atorvastatin calcium [From Lipitor] Allergy (Severe, Verified 04/19/17 06:58)


 Other-Enter Comments


haloperidol [From Haldol] Allergy (Verified 04/19/17 06:58)


 


simvastatin Allergy (Verified 05/12/17 18:42)


 








Home Medications: 














 Medication  Instructions  Recorded


 


Acetaminophen [Tylenol 325mg (*)] 325 - 650 mg PO Q4H PRN 05/12/17


 


Allopurinol [Allopurinol 300  mg PO DAILY 05/12/17





(RX)]  


 


Clopidogrel Bisulfate [Plavix (*)] 75 mg PO DAILY 05/12/17


 


Digoxin [Lanoxin 125 mcg (RX)] 125 mcg PO HS 05/12/17


 


Furosemide [Lasix 40 MG (*)] 40 mg PO DAILY 05/12/17


 


Losartan Potassium [Cozaar 25 mg 12.5 mg PO DAILY 05/12/17





(*)]  


 


Metoprolol Succinate Xr [Toprol Xl 100 mg PO DAILY 05/12/17





100 mg (*)]  


 


Nitroglycerin [Nitrostat 0.4 mg 0.4 mg SL AD PRN 05/12/17





(*)]  


 


Spironolactone [Aldactone 25 MG 12.5 mg PO DAILY 05/12/17





(*)]  


 


Warfarin Sodium [Coumadin 5MG (*)] 5 mg PO SUMOWETHFR@16 05/12/17


 


oxyCODONE IR [Oxycodone Ir (*)] 2.5 - 5 mg PO Q4H PRN 05/12/17


 


Furosemide [Lasix 40 MG (*)] 20 mg PO DAILY@16 05/20/17


 


Warfarin Sodium [Coumadin 5MG (*)] 7.5 mg PO TUSA@16 05/20/17














Medical Decision Making





- Diagnostics


EKG Interpretation: 


EKG interpreted by me reveals atrial fibrillation, ventricular rate ; 

ventricular premature complex; RBBB and LPFB.


ED Course/Re-evaluation: 


This 76-year-old male presents following a self-reported syncopal event after 

returning home from the hospital around 1300 today. This event was unwitnessed. 

He denies any injuries or head trauma secondary to the event. No evidence of 

trauma on exam. He was admitted to the hospital early this morning for CHF 

exacerbation with dyspnea; this is a common occurrence for the patient. The 

patient appears anxious about his care at home; he lives alone. I discussed 

with him my recommendation that he consider assisted living and continue down 

the path of arranging care at home. He expresses agreement to this.  His 

children are out of town this week, and his 2 neighbors (both nurses) are away.

  He states that this is making him anxious.  





Will proceed with labs and EKG. He does report increased Lasix use which may 

provide an underlying explanation for his possible syncopal episode.





Labs obtained. Troponin is elevated at 0.057, similar to prior troponins (no 

evidence of ACS). 





1554: Consultation with Dr. Mo Blanco, hospitalist who discharged the 

patient home this morning.  Multiple ongoing social factors, including 

medication non-compliance and falsifications, which makes evaluating and caring 

for this pt challenging. This assessment is in line with my assessment today.  

There is no evidence of falling down stairs today; I would expect at least 

areas of tenderness with such a fall. Regardless, this pt is not thriving at 

home and will likely need a step-up in his care. d/w ED case manager, multiple 

prior assessments by CM.





I discussed lab and EKG results with the patient. There is no evident 

underlying etiology for a syncopal episode, if this is indeed what the patient 

experienced. I advised admission for continue observation and evaluation with 

likely NH placement.  After a lengthy discussion, he declines admission and 

feels that he is safe at home. He is able to walk with a steady gait.  The 

patient will be discharged home in good condition with strict return 

precautions.


Differential Diagnosis: 


The differential diagnosis for the patient's syncope included but was not 

limited to vasovagal syncope, arrhythmia, dehydration, cardiogenic causes, 

neurogenic causes, and blood loss.





- Data Points


Laboratory Results: 


 Laboratory Results





 05/20/17 14:16 





 05/20/17 14:16 











Departure





- Departure


Disposition: Home, Routine, Self-Care


Clinical Impression: 


Syncope


Qualifiers:


 Syncope type: vasovagal syncope Qualified Code(s): R55 - Syncope and collapse





Condition: Fair


Instructions:  Syncope (ED)


Additional Instructions: 


1. Continue to arrange the home care through your primary care provider like 

you discussed with me. This may help you feel safer at home.





2. Return to the Emergency Department if you lose consciousness, have chest pain

, if it becomes more difficult for you to breathe, or for other serious 

concerns. 


Referrals: 


TIFFANY COVARRUBIAS [Other] - As per Instructions


Report Scribed for: Judy Knpap


Report Scribed by: Tawny Machado


Date of Report: 05/20/17


Time of Report: 15:12


Physician Review and Approval Statement: 





05/20/17 15:12


Portions of this note were transcribed by a medical scribe.  I personally 

performed a history, physical exam, medical decision making, and confirmed 

accuracy of information the transcribed note.

## 2017-05-20 NOTE — CPEKG
Heart Rate: 89

RR Interval: 674

QRSD Interval: 174

QT Interval: 436

QTC Interval: 531

QRS Axis: 56

T Wave Axis: 22

EKG Severity - ABNORMAL ECG -

EKG Impression: ATRIAL FIBRILLATION, V-RATE 

EKG Impression: PAIRED VENTRICULAR PREMATURE COMPLEXES

EKG Impression: RBBB AND LPFB

EKG Impression: ST DEPRESSION, CONSIDER ISCHEMIA, ANT-LAT LDS

Electronically Signed By: Steven Herbert 20-May-2017 06:53:33

## 2017-05-20 NOTE — EDPHY
H & P


Stated Complaint: chest pain


HPI/ROS: 





HPI





CHIEF COMPLAINT:  Shortness of breath





HISTORY OF PRESENT ILLNESS:  This patient very pleasant 76-year-old male, 

extensive cardiac history, was recently here in the hospital and had a ureteral 

stent exchange, and kidney stones operated on of his left kidney, he presents 

emergency room by cab from his local residence in [place] where he lives alone 

for shortness of breath.  He also states that he has left kidney pain. The 

patient tells me that he decided come to the emergency room due to shortness of 

breath.  He complains of PND and dyspnea on exertion.  He denies chest pain to 

me.  However it is reported that he did check-in up front by cab and complained 

to the triage nurse that he was having chest pain.  He tells me that he was 

lying about this.  He does endorse shortness of breath. States he is compliant 

with his medications including his Coumadin.  He denies any peripheral edema.  

He does endorse PND.  He tells me that he usually sleeps with 3 pillows however 

currently is sleeping 90 degrees.  He also distally tells me has left kidney 

pain.  Ever since having his procedure yesterday.





Past Medical History:  Significant past medical history for CAD(STENT LAD), ICM

,  EF of 20%, systolic heart failure, severe MR, AFib, medication noncompliance





Past Surgical History:  Ureteral stent exchange yesterday





Social History:  Lives locally in [place], denies drugs, alcohol, tobacco





Family History:  Noncontributory








ROS   


REVIEW OF SYSTEMS:


A comprehensive 10 point review of systems is otherwise negative aside from 

elements mentioned in the history of present illness.








Exam   


Constitutional  appears well nontoxic, triage nursing summary reviewed, vital 

signs reviewed, awake/alert. 


Eyes   normal conjunctivae and sclera, EOMI, PERRLA. 


HENT   normal inspection, atraumatic, moist mucus membranes, no epistaxis, neck 

supple/ no meningismus, no raccoon eyes. 


Respiratory   clear to auscultation bilaterally, normal breath sounds, no 

respiratory distress, no wheezing. 


Cardiovascular   rate normal, regular rhythm, no murmur, no edema, distal 

pulses normal. 


Gastrointestinal   soft, non-tender, no rebound, no guarding, normal bowel 

sounds, no distension, no pulsatile mass. 


Genitourinary   no CVA tenderness. 


Musculoskeletal  no midline vertebral tenderness, full range of motion, no calf 

swelling, no tenderness of extremities, no meningismus, good pulses, 

neurovascularly intact.


Skin   pink, warm, & dry, no rash, skin atraumatic. 


Neurologic   awake, alert and oriented x 3, AAOx3, moves all 4 extremities 

equally, motor intact, sensory intact, CN II-XII intact, normal cerebellar, 

normal vision, normal speech. 


Psychiatric   normal mood/affect. 


Heme/Lymph/Immune   no lymphadenopathy.





Differential diagnosis includes but is not limited to:  UTI, pyelonephritis, 

hydroureter, stent malfunction ACS, atypical chest pain, pneumothorax, pneumonia

, pulmonary embolism, aortic dissection, congestive heart failure, tumor, 

musculoskeletal pain, esophageal pain, GERD, peptic ulcer disease, pancreatitis





Medical Decision Making:  Plan for this patient full cardiac monitor, EKG, IV 

establishment, ultrasound kidney, urinalysis, blood work.  Chest x-ray.





Re-evaluation:





EKG interpretation by me on record in [employer] system.  Impression time of 

EKG 12:37 a.m. this is AFib rate of 89 PVCs present.  Right bundle-branch block 

and left posterior fascicular block present.  Abnormal ST depression V4 V5 V6.  

However when I compare this EKG to his old EKG dated 5/15/2017 has similar 

morphology.








ED x-ray chest one view:  Cardiomegaly present.  No significant pulmonary 

edema.  Image interpreted myself.





Initially this patient was noted to be ordered 1 L normal saline I have held 

his fluids as he has elevated BNP and troponin.





Ultrasound of the Kidney/retroperitoneal  The results of the study are Multiple 

stones, No hydro either kidneys.  I discussed the results of this study with 

the radiologist Dr. Kapoor.





0200:  Spoke with the hospitalist service Dr. Gilliland who agrees to admit this 

patient.  Reason for admission elevated BNP, positive troponin with shortness 

of breath.  I have ordered this patient 40 mg IV Lasix.  I have held his IV 

fluids.  I did update this patient that that due to his dyspnea on exertion, 

PND elevated BNP elevated troponin he will be staying in the hospital.  I have 

ordered him IV Lasix for diuresis.  Patient is fine with this plan.  Patient 

does tell me he is compliant with all his medications including Coumadin but 

his INR is low.


Source: Patient





- Personal History


Tetanus Vaccine Date: 3/2010





- Medical/Surgical History


Hx Asthma: No


Hx Chronic Respiratory Disease: No


Hx Diabetes: No


Hx Cardiac Disease: Yes


Hx Renal Disease: Yes


Hx Cirrhosis: No


Hx Alcoholism: No


Hx HIV/AIDS: No


Hx Splenectomy or Spleen Trauma: No


Other PMH: 1. Chronic systolic heart failure ejection fraction of 25%.  2. 

Coronary artery disease status post stenting on 11/16/2016.  3. Chronic atrial 

fibrillation.  4. Hypertension.  5. Chronic renal insufficiency.  6. 

Nephrolithiasis with ureteral stent placement November of 2016.  7. Gout.  8. 

Right total knee arthroplasty.  9. Appendectomy





- Social History


Smoking Status: Never smoked


Constitutional: 


 Initial Vital Signs











Temperature (C)  36.5 C   05/20/17 00:27


 


Heart Rate  102 H  05/20/17 00:27


 


Respiratory Rate  17   05/20/17 00:27


 


Blood Pressure  124/90 H  05/20/17 00:27


 


O2 Sat (%)  94   05/20/17 00:27








 











O2 Delivery Mode               Room Air














Allergies/Adverse Reactions: 


 





atorvastatin calcium [From Lipitor] Allergy (Severe, Verified 04/19/17 06:58)


 Other-Enter Comments


haloperidol [From Haldol] Allergy (Verified 04/19/17 06:58)


 


simvastatin Allergy (Verified 05/12/17 18:42)


 








Home Medications: 














 Medication  Instructions  Recorded


 


Acetaminophen [Tylenol 325mg (*)] 325 - 650 mg PO Q4H PRN 05/12/17


 


Allopurinol [Allopurinol 300  mg PO DAILY 05/12/17





(RX)]  


 


Clopidogrel Bisulfate [Plavix (*)] 75 mg PO DAILY 05/12/17


 


Digoxin [Lanoxin 125 mcg (RX)] 125 mcg PO HS 05/12/17


 


Furosemide [Lasix 20 MG (*)] 20 mg PO DAILY@1600 05/12/17


 


Furosemide [Lasix 40 MG (*)] 40 mg PO DAILY 05/12/17


 


Losartan Potassium [Cozaar 25 mg 12.5 mg PO DAILY 05/12/17





(*)]  


 


Metoprolol Succinate Xr [Toprol Xl 100 mg PO DAILY 05/12/17





100 mg (*)]  


 


Nitroglycerin [Nitrostat 0.4 mg 0.4 mg SL AD PRN 05/12/17





(*)]  


 


Spironolactone [Aldactone 25 MG 12.5 mg PO DAILY 05/12/17





(*)]  


 


Warfarin Sodium [Coumadin 5MG (*)] 5 mg PO SUMOWETHFR 05/12/17


 


Warfarin Sodium [Coumadin 7.5MG 7.5 mg PO TUSA 05/12/17





(*)]  


 


oxyCODONE IR [Oxycodone Ir (*)] 2.5 - 5 mg PO Q4H PRN 05/12/17














Medical Decision Making





- Data Points


Laboratory Results: 


 Laboratory Results





 05/20/17 00:55 





 05/20/17 00:55 





 











  05/20/17 05/20/17 05/20/17





  00:55 00:55 00:55


 


WBC      





    


 


RBC      





    


 


Hgb      





    


 


Hct      





    


 


MCV      





    


 


MCH      





    


 


MCHC      





    


 


RDW      





    


 


Plt Count      





    


 


MPV      





    


 


Neut % (Auto)      





    


 


Lymph % (Auto)      





    


 


Mono % (Auto)      





    


 


Eos % (Auto)      





    


 


Baso % (Auto)      





    


 


Nucleat RBC Rel Count      





    


 


Absolute Neuts (auto)      





    


 


Absolute Lymphs (auto)      





    


 


Absolute Monos (auto)      





    


 


Absolute Eos (auto)      





    


 


Absolute Basos (auto)      





    


 


Absolute Nucleated RBC      





    


 


Immature Gran %      





    


 


Immature Gran #      





    


 


PT      15.8 SEC H SEC





     (12.0-15.0) 


 


INR      1.26  H 





     (0.83-1.16) 


 


APTT      34.8 SEC SEC





     (23.0-38.0) 


 


Sodium    137 mEq/L mEq/L  





    (134-144)  


 


Potassium    3.7 mEq/L mEq/L  





    (3.5-5.2)  


 


Chloride    106 mEq/L mEq/L  





    ()  


 


Carbon Dioxide    22 mEq/l mEq/l  





    (22-31)  


 


Anion Gap    9 mEq/L mEq/L  





    (8-16)  


 


BUN    43 mg/dL H mg/dL  





    (7-23)  


 


Creatinine    1.5 mg/dL H mg/dL  





    (0.7-1.3)  


 


Estimated GFR    45   





    


 


Glucose    110 mg/dL H mg/dL  





    ()  


 


Calcium    8.8 mg/dL mg/dL  





    (8.5-10.4)  


 


Magnesium    1.8 mg/dL mg/dL  





    (1.6-2.3)  


 


Total Bilirubin    0.6 mg/dL mg/dL  





    (0.1-1.4)  


 


Conjugated Bilirubin    0.3 mg/dL mg/dL  





    (0.0-0.5)  


 


Unconjugated Bilirubin    0.3 mg/dL mg/dL  





    (0.0-1.1)  


 


AST    19 IU/L IU/L  





    (17-59)  


 


ALT    26 IU/L IU/L  





    (21-72)  


 


Alkaline Phosphatase    93 IU/L IU/L  





    ()  


 


Creatine Kinase    38 IU/L IU/L  





    (0-224)  


 


CK-MB (CK-2) Fraction    1.13 ng/mL ng/mL  





    (0-3.19)  


 


Troponin I    0.064 ng/mL H ng/mL  





    (0-0.034)  


 


NT-Pro-B Natriuret Pep  Pending   59763 pg/mL H pg/mL  





    (0-450)  


 


Total Protein    6.4 g/dL g/dL  





    (6.3-8.2)  


 


Albumin    3.7 g/dL g/dL  





    (3.5-5.0)  


 


Lipase    249.0 IU/L IU/L  





    ()  














  05/20/17





  00:55


 


WBC  8.11 10^3/uL 10^3/uL





   (3.80-9.50) 


 


RBC  4.19 10^6/uL L 10^6/uL





   (4.40-6.38) 


 


Hgb  13.0 g/dL L g/dL





   (13.7-17.5) 


 


Hct  39.2 % L %





   (40.0-51.0) 


 


MCV  93.6 fL fL





   (81.5-99.8) 


 


MCH  31.0 pg pg





   (27.9-34.1) 


 


MCHC  33.2 g/dL g/dL





   (32.4-36.7) 


 


RDW  15.2 % %





   (11.5-15.2) 


 


Plt Count  154 10^3/uL 10^3/uL





   (150-400) 


 


MPV  9.8 fL fL





   (8.7-11.7) 


 


Neut % (Auto)  63.1 % %





   (39.3-74.2) 


 


Lymph % (Auto)  27.1 % %





   (15.0-45.0) 


 


Mono % (Auto)  7.0 % %





   (4.5-13.0) 


 


Eos % (Auto)  1.5 % %





   (0.6-7.6) 


 


Baso % (Auto)  0.9 % %





   (0.3-1.7) 


 


Nucleat RBC Rel Count  0.0 % %





   (0.0-0.2) 


 


Absolute Neuts (auto)  5.12 10^3/uL 10^3/uL





   (1.70-6.50) 


 


Absolute Lymphs (auto)  2.20 10^3/uL 10^3/uL





   (1.00-3.00) 


 


Absolute Monos (auto)  0.57 10^3/uL 10^3/uL





   (0.30-0.80) 


 


Absolute Eos (auto)  0.12 10^3/uL 10^3/uL





   (0.03-0.40) 


 


Absolute Basos (auto)  0.07 10^3/uL 10^3/uL





   (0.02-0.10) 


 


Absolute Nucleated RBC  0.00 10^3/uL 10^3/uL





   (0-0.01) 


 


Immature Gran %  0.4 % %





   (0.0-1.1) 


 


Immature Gran #  0.03 10^3/uL 10^3/uL





   (0.00-0.10) 


 


PT  





  


 


INR  





  


 


APTT  





  


 


Sodium  





  


 


Potassium  





  


 


Chloride  





  


 


Carbon Dioxide  





  


 


Anion Gap  





  


 


BUN  





  


 


Creatinine  





  


 


Estimated GFR  





  


 


Glucose  





  


 


Calcium  





  


 


Magnesium  





  


 


Total Bilirubin  





  


 


Conjugated Bilirubin  





  


 


Unconjugated Bilirubin  





  


 


AST  





  


 


ALT  





  


 


Alkaline Phosphatase  





  


 


Creatine Kinase  





  


 


CK-MB (CK-2) Fraction  





  


 


Troponin I  





  


 


NT-Pro-B Natriuret Pep  





  


 


Total Protein  





  


 


Albumin  





  


 


Lipase  





  











Medications Given: 


 








Discontinued Medications





Sodium Chloride (Ns)  1,000 mls @ 0 mls/hr IV ONCE ONE


   PRN Reason: Wide Open


   Stop: 05/20/17 00:41


   Last Admin: 05/20/17 01:05 Dose:  Not Given








Departure





- Departure


Disposition: [facility]


Clinical Impression: 


 Shortness of breath





Condition: Fair


Referrals: 


TIFFANY COVARRUBIAS [Other] - As per Instructions

## 2017-05-21 ENCOUNTER — HOSPITAL ENCOUNTER (EMERGENCY)
Dept: HOSPITAL 80 - FED | Age: 77
Discharge: HOME | End: 2017-05-21
Payer: COMMERCIAL

## 2017-05-21 VITALS — SYSTOLIC BLOOD PRESSURE: 132 MMHG | HEART RATE: 70 BPM | RESPIRATION RATE: 16 BRPM | DIASTOLIC BLOOD PRESSURE: 78 MMHG

## 2017-05-21 VITALS — TEMPERATURE: 97.9 F | OXYGEN SATURATION: 93 %

## 2017-05-21 DIAGNOSIS — Z90.49: ICD-10-CM

## 2017-05-21 DIAGNOSIS — F41.9: ICD-10-CM

## 2017-05-21 DIAGNOSIS — N18.9: ICD-10-CM

## 2017-05-21 DIAGNOSIS — R07.9: Primary | ICD-10-CM

## 2017-05-21 DIAGNOSIS — I25.10: ICD-10-CM

## 2017-05-21 DIAGNOSIS — I12.9: ICD-10-CM

## 2017-05-21 DIAGNOSIS — I50.22: ICD-10-CM

## 2017-05-21 DIAGNOSIS — Z79.01: ICD-10-CM

## 2017-05-21 DIAGNOSIS — I11.0: ICD-10-CM

## 2017-05-21 LAB
% IMMATURE GRANULYOCYTES: 0.3 % (ref 0–1.1)
ABSOLUTE IMMATURE GRANULOCYTES: 0.02 10^3/UL (ref 0–0.1)
ABSOLUTE NRBC COUNT: 0 10^3/UL (ref 0–0.01)
ADD DIFF?: NO
ADD MORPH?: NO
ADD SCAN?: NO
ANION GAP SERPL CALC-SCNC: 13 MEQ/L (ref 8–16)
APTT BLD: 34.7 SEC (ref 23–38)
ATYPICAL LYMPHOCYTE FLAG: 0 (ref 0–99)
CALCIUM SERPL-MCNC: 9.4 MG/DL (ref 8.5–10.4)
CHLORIDE SERPL-SCNC: 104 MEQ/L (ref 97–110)
CO2 SERPL-SCNC: 23 MEQ/L (ref 22–31)
CREAT SERPL-MCNC: 1.3 MG/DL (ref 0.7–1.3)
ERYTHROCYTE [DISTWIDTH] IN BLOOD BY AUTOMATED COUNT: 14.9 % (ref 11.5–15.2)
FRAGMENT RBC FLAG: 0 (ref 0–99)
GFR SERPL CREATININE-BSD FRML MDRD: 54 ML/MIN/{1.73_M2}
GLUCOSE SERPL-MCNC: 81 MG/DL (ref 70–100)
HCT VFR BLD CALC: 45 % (ref 40–51)
HGB BLD-MCNC: 15.2 G/DL (ref 13.7–17.5)
INR PPP: 1.41 (ref 0.83–1.16)
LEFT SHIFT FLG: 0 (ref 0–99)
LIPEMIA HEMOLYSIS FLAG: 90 (ref 0–99)
MCH RBC BLDCO QN: 31.2 PG (ref 27.9–34.1)
MCHC RBC AUTO-ENTMCNC: 33.8 G/DL (ref 32.4–36.7)
MCV RBC AUTO: 92.4 FL (ref 81.5–99.8)
NRBC-AUTO%: 0 % (ref 0–0.2)
PLATELET # BLD: 198 10^3/UL (ref 150–400)
PLATELET CLUMPS FLAG: 10 (ref 0–99)
PMV BLD AUTO: 10.2 FL (ref 8.7–11.7)
POTASSIUM SERPL-SCNC: 3.9 MEQ/L (ref 3.5–5.2)
PROTHROMBIN TIME: 17.2 SEC (ref 12–15)
RBC # BLD AUTO: 4.87 10^6/UL (ref 4.4–6.38)
SODIUM SERPL-SCNC: 140 MEQ/L (ref 134–144)
TROPONIN I SERPL-MCNC: 0.07 NG/ML (ref 0–0.03)

## 2017-05-21 NOTE — CPEKG
Heart Rate: 76

RR Interval: 789

QRSD Interval: 138

QT Interval: 440

QTC Interval: 495

QRS Axis: -37

T Wave Axis: 202

EKG Severity - ABNORMAL ECG -

EKG Impression: ATRIAL FIBRILLATION, V-RATE 42-42

EKG Impression: RUN OF VENTRICULAR PREMATURE COMPLEXES

EKG Impression: LEFT BUNDLE BRANCH BLOCK

EKG Impression: similar to previous

Electronically Signed By: Rubén Grimm 21-May-2017 21:54:04

## 2017-05-21 NOTE — EDPHY
H & P


Stated Complaint: dizziness, shortness of breath, "confusion" tonight


Time Seen by Provider: 05/21/17 21:07


HPI/ROS: 





CHIEF COMPLAINT:  chest pain





HISTORY OF PRESENT ILLNESS:  the patient is a 76-year-old man who is brought by 

EMS for chest pain, dizziness, shortness of breath and anxiety that began this 

evening.  It woke him from sleep about an hour ago.  He has been here multiple 

times this week and several times to [facility] for the same complaints.  

He has a history of chronic systolic heart failure with an ejection fraction of 

15 sent seen on a nuclear stress test a week ago.  He also has atrial 

fibrillation and takes plavix and Coumadin.  He also has severe anxiety.  He 

states that every time when he called ambulance with the symptoms when the 

arrive and he gets into the back of the ambulance his symptoms completely 

resolve.  Even without medication.  He did take aspirin this evening as well.








REVIEW OF SYSTEMS:


Constitutional:  denies: chills, fever, recent illness, recent injury


EENTM: denies: blurred vision, double vision, nose congestion


Respiratory: denies: cough, shortness of breath


Cardiac:   See HPI


Gastrointestinal/Abdominal: denies: abdominal pain, diarrhea, nausea, vomiting, 

blood streaked stools


Genitourinary: denies: dysuria, frequency, hematuria, pain


Musculoskeletal: denies: joint pain, muscle pain


Skin: denies: lesions, rash, jaundice, bruising


Neurological: denies: headache, numbness, paresthesia, tingling, dizziness, 

weakness


Hematologic/Lymphatic: denies: blood clots, easy bleeding, easy bruising


Immunologic/allergic: denies: HIV/AIDS, transplant








EXAM:


GENERAL:  Well-appearing, anxious.


HEAD:  Atraumatic, normocephalic.


EYES:  Pupils equal round and reactive to light, extraocular movements intact, 

sclera anicteric, conjunctiva are normal.


ENT:  TMs normal, nares patent, oropharynx clear without exudates.  Moist 

mucous membranes.


NECK:  Normal range of motion, supple without lymphadenopathy or JVD.


LUNGS:  Breath sounds clear to auscultation bilaterally and equal.  No wheezes 

rales or rhonchi.


HEART:  Regular rate and rhythm without murmurs, rubs or gallops.


ABDOMEN:  Soft, nontender, normoactive bowel sounds.  No guarding, no rebound.  

No masses appreciated.


BACK:  No CVA tenderness, no spinal tenderness, step-offs or deformities


EXTREMITIES:  Normal range of motion, no pitting or edema.  No clubbing or 

cyanosis.


NEUROLOGICAL:  Cranial nerves II through XII grossly intact.  Normal speech, 

normal gait.  5/5 strength, normal movement in all extremities, normal sensation


PSYCH:  Normal mood, normal affect.


SKIN:  Warm, dry, normal turgor, no visible rashes or lesions.








Source: Patient


Exam Limitations: No limitations





- Personal History


Current Tetanus/Diphtheria Vaccine: Yes


Current Tetanus Diphtheria and Acellular Pertussis (TDAP): Yes


Tetanus Vaccine Date: 3/2010





- Medical/Surgical History


Hx Asthma: No


Hx Chronic Respiratory Disease: No


Hx Diabetes: No


Hx Cardiac Disease: Yes


Hx Renal Disease: Yes


Hx Cirrhosis: No


Hx Alcoholism: No


Hx HIV/AIDS: No


Hx Splenectomy or Spleen Trauma: No


Other PMH: 1. Chronic systolic heart failure ejection fraction of 25%.  2. 

Coronary artery disease status post stenting on 11/16/2016.  3. Chronic atrial 

fibrillation.  4. Hypertension.  5. Chronic renal insufficiency.  6. 

Nephrolithiasis with ureteral stent placement November of 2016.  7. Gout.  8. 

Right total knee arthroplasty.  9. Appendectomy.  10. kidney stones





- Family History


Significant Family History: No pertinent family hx





- Social History


Smoking Status: Never smoked


Alcohol Use: Sober


Drug Use: None


Constitutional: 


 Initial Vital Signs











Temperature (C)  36.6 C   05/21/17 21:09


 


Heart Rate  75   05/21/17 21:09


 


Respiratory Rate  18   05/21/17 21:09


 


Blood Pressure  114/82 H  05/21/17 21:09


 


O2 Sat (%)  93   05/21/17 21:09








 











O2 Delivery Mode               Room Air














Allergies/Adverse Reactions: 


 





atorvastatin calcium [From Lipitor] Allergy (Severe, Verified 04/19/17 06:58)


 Other-Enter Comments


haloperidol [From Haldol] Allergy (Verified 04/19/17 06:58)


 


simvastatin Allergy (Verified 05/12/17 18:42)


 








Home Medications: 














 Medication  Instructions  Recorded


 


Acetaminophen [Tylenol 325mg (*)] 325 - 650 mg PO Q4H PRN 05/12/17


 


Allopurinol [Allopurinol 300  mg PO DAILY 05/12/17





(RX)]  


 


Clopidogrel Bisulfate [Plavix (*)] 75 mg PO DAILY 05/12/17


 


Digoxin [Lanoxin 125 mcg (RX)] 125 mcg PO HS 05/12/17


 


Furosemide [Lasix 40 MG (*)] 40 mg PO DAILY 05/12/17


 


Losartan Potassium [Cozaar 25 mg 12.5 mg PO DAILY 05/12/17





(*)]  


 


Metoprolol Succinate Xr [Toprol Xl 100 mg PO DAILY 05/12/17





100 mg (*)]  


 


Nitroglycerin [Nitrostat 0.4 mg 0.4 mg SL AD PRN 05/12/17





(*)]  


 


Spironolactone [Aldactone 25 MG 12.5 mg PO DAILY 05/12/17





(*)]  


 


Warfarin Sodium [Coumadin 5MG (*)] 5 mg PO SUMOWETHFR@16 05/12/17


 


oxyCODONE IR [Oxycodone Ir (*)] 2.5 - 5 mg PO Q4H PRN 05/12/17


 


Furosemide [Lasix 40 MG (*)] 20 mg PO DAILY@16 05/20/17


 


Warfarin Sodium [Coumadin 5MG (*)] 7.5 mg PO TUSA@16 05/20/17














Medical Decision Making





- Diagnostics


EKG Interpretation: 





An EKG obtained and was read and documented in trace view.  Please see trace 

view for full reading and report. Atrial fibrillation, left bundle branch block

, unchanged from previous, PVC


ED Course/Re-evaluation: 





 the patient now regrets coming here.  He states that he was anxious and over-

reacted.  He asked to go to [facility] but they would not take him there.  

He was on the phone with the nursing line there.  He states that he will just 

go home.  He has an appointment in 12 hours for 2 doctors to come and evaluate 

his home and assess his need for home care versus skilled nursing facility.  

After much discussion we agreed to  have him stay for an EKG and lab work.





10:15 p.m.   the patient is asymptomatic.  His troponin is slightly elevated 

but baseline for him.  I discussed this with the physician at [facility].  He 

recommended follow-up in the Cardiology Clinic tomorrow.  The patient is happy 

with this.  He does not wish to stay.  We discussed indications for returning.


Differential Diagnosis: 





Partial list of the Differential diagnosis considered include but were not 

limited to; Acute coronary disease, anxiety, PE and although unlikely based on 

the history and physical exam, I also considered  dissection, pneumonia,. 








- Data Points


Laboratory Results: 


 Laboratory Results





 05/21/17 21:15 





 05/21/17 21:15 





 











  05/21/17 05/21/17 05/21/17





  21:15 21:15 21:15


 


WBC      7.51 10^3/uL 10^3/uL





     (3.80-9.50) 


 


RBC      4.87 10^6/uL 10^6/uL





     (4.40-6.38) 


 


Hgb      15.2 g/dL g/dL





     (13.7-17.5) 


 


Hct      45.0 % %





     (40.0-51.0) 


 


MCV      92.4 fL fL





     (81.5-99.8) 


 


MCH      31.2 pg pg





     (27.9-34.1) 


 


MCHC      33.8 g/dL g/dL





     (32.4-36.7) 


 


RDW      14.9 % %





     (11.5-15.2) 


 


Plt Count      198 10^3/uL 10^3/uL





     (150-400) 


 


MPV      10.2 fL fL





     (8.7-11.7) 


 


Neut % (Auto)      53.0 % %





     (39.3-74.2) 


 


Lymph % (Auto)      35.4 % %





     (15.0-45.0) 


 


Mono % (Auto)      8.8 % %





     (4.5-13.0) 


 


Eos % (Auto)      1.7 % %





     (0.6-7.6) 


 


Baso % (Auto)      0.8 % %





     (0.3-1.7) 


 


Nucleat RBC Rel Count      0.0 % %





     (0.0-0.2) 


 


Absolute Neuts (auto)      3.98 10^3/uL 10^3/uL





     (1.70-6.50) 


 


Absolute Lymphs (auto)      2.66 10^3/uL 10^3/uL





     (1.00-3.00) 


 


Absolute Monos (auto)      0.66 10^3/uL 10^3/uL





     (0.30-0.80) 


 


Absolute Eos (auto)      0.13 10^3/uL 10^3/uL





     (0.03-0.40) 


 


Absolute Basos (auto)      0.06 10^3/uL 10^3/uL





     (0.02-0.10) 


 


Absolute Nucleated RBC      0.00 10^3/uL 10^3/uL





     (0-0.01) 


 


Immature Gran %      0.3 % %





     (0.0-1.1) 


 


Immature Gran #      0.02 10^3/uL 10^3/uL





     (0.00-0.10) 


 


PT  17.2 SEC H SEC    





   (12.0-15.0)   


 


INR  1.41  H     





   (0.83-1.16)   


 


APTT  34.7 SEC SEC    





   (23.0-38.0)   


 


Sodium    140 mEq/L mEq/L  





    (134-144)  


 


Potassium    3.9 mEq/L mEq/L  





    (3.5-5.2)  


 


Chloride    104 mEq/L mEq/L  





    ()  


 


Carbon Dioxide    23 mEq/l mEq/l  





    (22-31)  


 


Anion Gap    13 mEq/L mEq/L  





    (8-16)  


 


BUN    35 mg/dL H mg/dL  





    (7-23)  


 


Creatinine    1.3 mg/dL mg/dL  





    (0.7-1.3)  


 


Estimated GFR    54   





    


 


Glucose    81 mg/dL mg/dL  





    ()  


 


Calcium    9.4 mg/dL mg/dL  





    (8.5-10.4)  


 


Troponin I    0.067 ng/mL H ng/mL  





    (0-0.034)  














Departure





- Departure


Disposition: Home, Routine, Self-Care


Clinical Impression: 


 Chest pain in adult, Anxiety





Condition: Fair


Instructions:  Chest Pain (ED), Anxiety (ED)


Additional Instructions: 


 follow-up with your [facility] cardiologist tomorrow as discussed.  Your INR today 

was 1.4.


Referrals: 


[facility] ,. [[facility] Groups for Call Sched] - As per Instructions

## 2017-05-22 NOTE — CPEKG
Heart Rate: 85

RR Interval: 706

QRSD Interval: 176

QT Interval: 428

QTC Interval: 509

QRS Axis: 57

T Wave Axis: 17

EKG Severity - ABNORMAL ECG -

EKG Impression: ATRIAL FIBRILLATION, V-RATE 

EKG Impression: MULTIFORM VENTRICULAR PREMATURE COMPLEXES

EKG Impression: RIGHT BUNDLE BRANCH BLOCK

Preliminary Awaiting MD Review

## 2017-06-01 ENCOUNTER — HOSPITAL ENCOUNTER (EMERGENCY)
Dept: HOSPITAL 80 - FED | Age: 77
Discharge: LEFT BEFORE DISCHARGE | End: 2017-06-01
Payer: COMMERCIAL

## 2017-06-01 VITALS — TEMPERATURE: 98.1 F | OXYGEN SATURATION: 96 % | RESPIRATION RATE: 16 BRPM

## 2017-06-01 VITALS — SYSTOLIC BLOOD PRESSURE: 109 MMHG | DIASTOLIC BLOOD PRESSURE: 71 MMHG | HEART RATE: 74 BPM

## 2017-06-01 DIAGNOSIS — Z79.01: ICD-10-CM

## 2017-06-01 DIAGNOSIS — I25.10: ICD-10-CM

## 2017-06-01 DIAGNOSIS — R06.02: Primary | ICD-10-CM

## 2017-06-01 DIAGNOSIS — I10: ICD-10-CM

## 2017-06-01 NOTE — EDPHY
H & P


Stated Complaint: CP/back, resolved


HPI/ROS: 





HPI





CHIEF COMPLAINT:  Anxiety, SOB





HISTORY OF PRESENT ILLNESS:  This patient is a 76-year-old male's who I am very 

familiar with, presents emergency room by EMS for chest pain and back pain now 

resolved prior to arrival.  Does have significant past medical history of 

coronary artery disease with stent in his LAD, ischemic cardiomyopathy, low 

ejection fraction of 15%, AFib, on Coumadin, ureteral stent, he presents to the 

emergency room stating that he woke up suddenly around 2:00 a.m. feeling short 

of breath he became anxious.  Denies to me he ever had chest pain does admit 

that he had some very low back pain.  He states all this resolved now he feels 

much better being in the emergency room.  I am very familiar with him this is 

17th ER visit I offered him a medical evaluation and workup as he does have a 

very ill heart with multiple medical problems.  However the patient is 

declining any evaluation here in the emergency room he does not want blood work 

it does not want imaging, does not want any medical attention he states that he 

thinks he got anxious and short of breath and now wants to go home.  Due to his 

cardiac risk factors he does appear well however he does have extensive medical 

history and risk factors he will need to sign out against medical advice given 

that he arrived in the emergency room shortness of breath as now refusing 

evaluation.  The patient does believe that this is acute anxiety.





Past Medical History:coronary artery disease with stent in his LAD, ischemic 

cardiomyopathy, low ejection fraction of 15%, AFib, on Coumadin, ureteral stent

, 





Past Surgical History:  PTCA, ureteral stent





Social History:  Lives locally, denies daily use drugs alcohol tobacco





Family History:  Noncontributory








ROS   


REVIEW OF SYSTEMS:


A comprehensive 10 point review of systems is otherwise negative aside from 

elements mentioned in the history of present illness.








Exam   


Constitutional  appears well nontoxic triage nursing summary reviewed, vital 

signs reviewed, awake/alert. 


Eyes   normal conjunctivae and sclera, EOMI, PERRLA. 


HENT   normal inspection, atraumatic, moist mucus membranes, no epistaxis, neck 

supple/ no meningismus, no raccoon eyes. 


Respiratory   clear to auscultation bilaterally, normal breath sounds, no 

respiratory distress, no wheezing. 


Cardiovascular   rate normal, regular rhythm, no murmur, no edema, distal 

pulses normal. 


Gastrointestinal   soft, non-tender, no rebound, no guarding, normal bowel 

sounds, no distension, no pulsatile mass. 


Genitourinary   no CVA tenderness. 


Musculoskeletal  no midline vertebral tenderness, full range of motion, no calf 

swelling, no tenderness of extremities, no meningismus, good pulses, 

neurovascularly intact.


Skin   pink, warm, & dry, no rash, skin atraumatic. 


Neurologic   awake, alert and oriented x 3, AAOx3, moves all 4 extremities 

equally, motor intact, sensory intact, CN II-XII intact, normal cerebellar, 

normal vision, normal speech. 


Psychiatric   normal mood/affect. 


Heme/Lymph/Immune   no lymphadenopathy.





Differential Diagnosis:  Includes but is not limited to in a particular order, 

ACS, CHF, pneumonia, anxiety, pulmonary edema





Medical Decision Making:  Plan for this patient since arriving to the emergency 

room is now declining any evaluation he does not want blood or imaging.  He is 

requesting be let go.  He has declined any medical evaluation.  He understands 

the need to sign against medical advice given his extensive medical history.  I 

did offer him medical workup and possible admission however he has declined.  

He thinks that was just acute anxiety they brought him to the emergency room.





Re-evaluation:








EKG interpretation by me on record in [facility] system.  Impression time of 

EKG 2:15 a.m., atrial fib rate of 91 this is abnormal appearing EKG with ST 

depression in lead V4 V5 V6 right bundle-branch block present.  When I compare 

this EKG to his previous EKG dated 5/21/2017 this is exact same morphology no 

acute new changes.  I do not appreciate acute ischemic change on the EKG today.





0302AM:  Patient is hemodynamically stable no acute distress with normal vital 

signs appears well.  EKG is unchanged from previous.  Patient signed out 

against medical advice at this time.  Does understand is 1 welcome to return at 

any time if he has any further questions or concerns including chest pain 

shortness of breath or changes his mind.


Source: Patient, EMS





- Personal History


Current Tetanus/Diphtheria Vaccine: Yes


Current Tetanus Diphtheria and Acellular Pertussis (TDAP): Yes


Tetanus Vaccine Date: 3/2010





- Medical/Surgical History


Hx Asthma: No


Hx Chronic Respiratory Disease: No


Hx Diabetes: No


Hx Cardiac Disease: Yes


Hx Renal Disease: Yes


Hx Cirrhosis: No


Hx Alcoholism: No


Hx HIV/AIDS: No


Hx Splenectomy or Spleen Trauma: No


Other PMH: 1. Chronic systolic heart failure ejection fraction of 25%.  2. 

Coronary artery disease status post stenting on 11/16/2016.  3. Chronic atrial 

fibrillation.  4. Hypertension.  5. Chronic renal insufficiency.  6. 

Nephrolithiasis with ureteral stent placement November of 2016.  7. Gout.  8. 

Right total knee arthroplasty.  9. Appendectomy.  10. kidney stones





- Social History


Smoking Status: Never smoked


Constitutional: 


 Initial Vital Signs











Temperature (C)  36.7 C   06/01/17 02:11


 


Heart Rate  80   06/01/17 02:11


 


Respiratory Rate  16   06/01/17 02:11


 


Blood Pressure  122/87 H  06/01/17 02:11


 


O2 Sat (%)  96   06/01/17 02:11








 











O2 Delivery Mode               Room Air














Allergies/Adverse Reactions: 


 





atorvastatin calcium [From Lipitor] Allergy (Severe, Verified 04/19/17 06:58)


 Other-Enter Comments


haloperidol [From Haldol] Allergy (Verified 04/19/17 06:58)


 


simvastatin Allergy (Verified 05/12/17 18:42)


 








Home Medications: 














 Medication  Instructions  Recorded


 


Acetaminophen [Tylenol 325mg (*)] 325 - 650 mg PO Q4H PRN 05/12/17


 


Allopurinol [Allopurinol 300  mg PO DAILY 05/12/17





(RX)]  


 


Clopidogrel Bisulfate [Plavix (*)] 75 mg PO DAILY 05/12/17


 


Digoxin [Lanoxin 125 mcg (RX)] 125 mcg PO HS 05/12/17


 


Furosemide [Lasix 40 MG (*)] 40 mg PO DAILY 05/12/17


 


Losartan Potassium [Cozaar 25 mg 12.5 mg PO DAILY 05/12/17





(*)]  


 


Metoprolol Succinate Xr [Toprol Xl 100 mg PO DAILY 05/12/17





100 mg (*)]  


 


Nitroglycerin [Nitrostat 0.4 mg 0.4 mg SL AD PRN 05/12/17





(*)]  


 


Spironolactone [Aldactone 25 MG 12.5 mg PO DAILY 05/12/17





(*)]  


 


Warfarin Sodium [Coumadin 5MG (*)] 5 mg PO SUMOWETHFR@16 05/12/17


 


oxyCODONE IR [Oxycodone Ir (*)] 2.5 - 5 mg PO Q4H PRN 05/12/17


 


Furosemide [Lasix 40 MG (*)] 20 mg PO DAILY@16 05/20/17


 


Warfarin Sodium [Coumadin 5MG (*)] 7.5 mg PO TUSA@16 05/20/17














Departure





- Departure


Disposition: Against Medical Advice


Clinical Impression: 


 Shortness of breath





Condition: Good


Instructions:  Dyspnea (ED)


Additional Instructions: 


1. Return emergency room immediately if he changed her mind 1 medical 

attention.  I would recommend returning if you have chest pain or shortness of 

breath.


Referrals: 


TIFFANY COVARRUBIAS [Other] - As per Instructions

## 2017-06-01 NOTE — CPEKG
Heart Rate: 91

RR Interval: 659

QRSD Interval: 174

QT Interval: 448

QTC Interval: 552

QRS Axis: 22

T Wave Axis: -7

EKG Severity - ABNORMAL ECG -

EKG Impression: ATRIAL FIBRILLATION, V-RATE 

EKG Impression: VENTRICULAR PREMATURE COMPLEX

EKG Impression: RIGHT BUNDLE BRANCH BLOCK

Electronically Signed By: Steven Herbert 01-Jun-2017 07:31:02

## 2017-07-16 ENCOUNTER — HOSPITAL ENCOUNTER (EMERGENCY)
Dept: HOSPITAL 80 - FED | Age: 77
Discharge: HOME | End: 2017-07-16
Payer: COMMERCIAL

## 2017-07-16 VITALS
OXYGEN SATURATION: 95 % | DIASTOLIC BLOOD PRESSURE: 90 MMHG | SYSTOLIC BLOOD PRESSURE: 117 MMHG | RESPIRATION RATE: 16 BRPM | HEART RATE: 86 BPM

## 2017-07-16 VITALS — TEMPERATURE: 98.4 F

## 2017-07-16 DIAGNOSIS — Z79.01: ICD-10-CM

## 2017-07-16 DIAGNOSIS — I10: ICD-10-CM

## 2017-07-16 DIAGNOSIS — R06.02: Primary | ICD-10-CM

## 2017-07-16 DIAGNOSIS — I25.10: ICD-10-CM

## 2017-07-16 NOTE — EDPHY
H & P


Stated Complaint: sob/nausea x 1 hr


Time Seen by Provider: 07/16/17 02:05


HPI/ROS: 





Chief Complaint:  Shortness of breath





HPI:  76-year-old male woke this morning with shortness of breath.  He has had 

multiple admissions for the same in the past several months.  He was recently 

discharged from [facility] 4 days ago for the same episode.  

Patient has been on nocturnal oxygen for the last couple weeks that after 

having a renal stent placed and removed.  Patient states that he is continuing 

to have episodes of shortness of breath at night but intermittently.  States 

that recently had his Lasix dose in increased and had his fluid allowing us 

decreased during the day.  He has a history of significant cardiomyopathy with 

poor ejection fraction and valvular disease.  Did not have any chest pain. No 

nausea or vomiting. Patient states he got up walk around but did not feel 

significantly improved.  States he was wearing his oxygen at 2 L as instructed.

  Has not had any episodes of shortness of breath the last couple of mornings 

all at home.  He does live alone in a trailer.  Does have multiple family 

members in town.





ROS:  10 point Review of Systems is negative except as noted in the HPI.





PMH:  Cardiomyopathy with ejection fraction 20%, mitral regurgitation, aortic 

regurgitation





Social History: No smoking, no alcohol,  no recreational drug use





Family History: non-contributory





Physical Exam:


Gen: Awake, Alert, No Distress


HEENT:  


     Nose: no rhinorrhea


     Eyes: PERRLA, EOMI


     Mouth: Moist mucosa 


Neck: Supple, no JVD


Chest: nontender, lungs clear to auscultation


Heart: S1, S2 normal, no murmur


Abd: Soft, non-tender, no guarding


Back: no CVA tenderness, no midline tenderness 


Ext: no edema, non-tender


Skin: no rash


Neuro: CN II-XII intact, Sensation grossly intact, Strength 5/5 in bilateral 

upper and lower extremities








- Personal History


Tetanus Vaccine Date: 3/2010





- Medical/Surgical History


Hx Asthma: No


Hx Chronic Respiratory Disease: No


Hx Diabetes: No


Hx Cardiac Disease: Yes


Hx Renal Disease: Yes


Hx Cirrhosis: No


Hx Alcoholism: No


Hx HIV/AIDS: No


Hx Splenectomy or Spleen Trauma: No


Other PMH: 1. Chronic systolic heart failure ejection fraction of 25%.  

hyperlipidemia.  2. Coronary artery disease status post stenting on 11/16/2016.

  3. Chronic atrial fibrillation.  4. Hypertension.  5. Chronic renal 

insufficiency.  6. Nephrolithiasis with ureteral stent placement November of 2016.  7. Gout.  8. Right total knee arthroplasty.  9. Appendectomy.  10. 

kidney stones





- Social History


Smoking Status: Never smoked


Constitutional: 





 Initial Vital Signs











Temperature (C)  36.9 C   07/16/17 02:01


 


Heart Rate  65   07/16/17 02:01


 


Respiratory Rate  20   07/16/17 02:01


 


Blood Pressure  122/85 H  07/16/17 02:01


 


O2 Sat (%)  95   07/16/17 02:01








 











O2 Delivery Mode               Room Air














Allergies/Adverse Reactions: 


 





atorvastatin calcium [From Lipitor] Allergy (Severe, Verified 07/16/17 02:03)


 Other-Enter Comments


haloperidol [From Haldol] Allergy (Verified 07/16/17 02:03)


 


simvastatin Allergy (Verified 07/16/17 02:03)


 








Home Medications: 














 Medication  Instructions  Recorded


 


Acetaminophen [Tylenol 325mg (*)] 325 - 650 mg PO Q4H PRN 05/12/17


 


Allopurinol [Allopurinol 300  mg PO DAILY 05/12/17





(RX)]  


 


Clopidogrel Bisulfate [Plavix (*)] 75 mg PO DAILY 05/12/17


 


Digoxin [Lanoxin 125 mcg (RX)] 125 mcg PO HS 05/12/17


 


Furosemide [Lasix 40 MG (*)] 40 mg PO DAILY 05/12/17


 


Losartan Potassium [Cozaar 25 mg 12.5 mg PO DAILY 05/12/17





(*)]  


 


Metoprolol Succinate Xr [Toprol Xl 100 mg PO DAILY 05/12/17





100 mg (*)]  


 


Nitroglycerin [Nitrostat 0.4 mg 0.4 mg SL AD PRN 05/12/17





(*)]  


 


Spironolactone [Aldactone 25 MG 12.5 mg PO DAILY 05/12/17





(*)]  


 


Warfarin Sodium [Coumadin 5MG (*)] 5 mg PO SUMOWETHFR@16 05/12/17


 


oxyCODONE IR [Oxycodone Ir (*)] 2.5 - 5 mg PO Q4H PRN 05/12/17


 


Furosemide [Lasix 40 MG (*)] 20 mg PO DAILY@16 05/20/17


 


Warfarin Sodium [Coumadin 5MG (*)] 7.5 mg PO TUSA@16 05/20/17














Medical Decision Making


ED Course/Re-evaluation: 





As reviewed the patient's discharge summary from [facility] 

4 days ago.  It is documented on his discharge summary that he has had 20+ 

presentations for nocturnal shortness of breath over the last 6 months.  He is 

also noted to have significant history for severe cardiomyopathy with 20% 

ejection fraction, implantable cardiac defibrillator, moderate to severe mitral 

regurgitation, moderate aortic regurgitation, dementia, atrial fibrillation, 

and personality disorder.  Patient was admitted for an episode of shortness of 

breath similar to this morning's.  Had a negative workup was discharged home 

with the plan of continuing his nocturnal oxygen.  No other acute findings on 

him during that admission.  Here the patient is awake alert and appears in no 

distress. Oxygen saturations in the mid 90s but do note to fall off into the 

70s while he is sleeping.  I wonder if there might be some sleep apnea 

component to this.  Seeing as he had recent admission completely negative 

workup at that time I do not feel is necessary given his presentation and with 

normal vital signs and appearing well to repeat these tests at this time.  I 

did repeat a chest x-ray which shows no focal infiltrates or significant 

changes from compared to priors.  He is currently at his baseline.  My plan 

will be to call and contact the [facility] to arrange close follow-up with him 

and alert him of his presentation this morning.  Will continue to monitor his 

progress in the emergency department.





0430  patient is feeling better here.  He has had no interventions other than 2 

L of oxygen via nasal cannula.  He states that he feels back to his baseline 

now and feels that he can go home.  No acute finding on chest x-ray.  I will 

call and notify the [facility] that he present to the emergency department this 

morning.  He has been encouraged to return for any concerns.  I do not think I 

see evidence of acute heart failure at this time.  His oxygen with a shin is 

good.  He has not have any edema at this time.  He is otherwise at his baseline 

and a negative recent workup.  Will discharge with follow up with his [facility] 

doctors.





Departure





- Departure


Disposition: Home, Routine, Self-Care


Clinical Impression: 


 Shortness of breath





Condition: Good


Instructions:  Dyspnea (ED)


Additional Instructions: 


Follow up with primary care physician in 2-3 days for further evaluation.


Return to the emergency depart for increasing chest pain, shortness of breath, 

fevers, chills, or any other concerns.


Referrals: 


TIFFANY COVARRUBIAS [Other] - As per Instructions

## 2017-07-31 ENCOUNTER — HOSPITAL ENCOUNTER (EMERGENCY)
Dept: HOSPITAL 80 - FED | Age: 77
Discharge: HOME | End: 2017-07-31
Payer: COMMERCIAL

## 2017-07-31 VITALS
DIASTOLIC BLOOD PRESSURE: 80 MMHG | RESPIRATION RATE: 18 BRPM | OXYGEN SATURATION: 93 % | SYSTOLIC BLOOD PRESSURE: 100 MMHG | HEART RATE: 86 BPM

## 2017-07-31 VITALS — TEMPERATURE: 98.2 F

## 2017-07-31 DIAGNOSIS — T50.1X5A: ICD-10-CM

## 2017-07-31 DIAGNOSIS — Z79.01: ICD-10-CM

## 2017-07-31 DIAGNOSIS — F41.9: ICD-10-CM

## 2017-07-31 DIAGNOSIS — R42: Primary | ICD-10-CM

## 2017-07-31 DIAGNOSIS — I25.10: ICD-10-CM

## 2017-07-31 DIAGNOSIS — I10: ICD-10-CM

## 2017-07-31 NOTE — EDPHY
H & P


Stated Complaint: dizzy, nausea


Time Seen by Provider: 07/31/17 13:16


HPI/ROS: 





CHIEF COMPLAINT:  Lightheaded





HISTORY OF PRESENT ILLNESS:  The patient is a 76-year-old man with a history 

chronic systolic heart failure with ejection fraction 20% also with atrial 

fibrillation on Plavix and Coumadin and severe anxiety.  He has been seen 

multiple times here in this ER and also a good [facility] for similar symptoms.  

He was switched yesterday from Lasix to Demadex in an effort to increase 

diuresis and improve his chronic shortness of breath. He states that he took 

his 1st dose yesterday and felt immediately lightheaded.  He took a 2nd dose 

this morning and has felt lightheaded ever since.  He denies chest pain or 

shortness of breath today.  He denies fevers. no nausea vomiting. 








REVIEW OF SYSTEMS:


Constitutional:  See HPI, denies: chills, fever, recent illness, recent injury


EENTM: denies: blurred vision, double vision, nose congestion


Respiratory: denies: cough, shortness of breath


Cardiac: denies: chest pain, irregular heart rate, lightheadedness, palpitations


Gastrointestinal/Abdominal: denies: abdominal pain, diarrhea, nausea, vomiting, 

blood streaked stools


Genitourinary: denies: dysuria, frequency, hematuria, pain


Musculoskeletal: denies: joint pain, muscle pain


Skin: denies: lesions, rash, jaundice, bruising


Neurological: denies: headache, numbness, paresthesia, tingling, dizziness, 

weakness


Hematologic/Lymphatic: denies: blood clots, easy bleeding, easy bruising


Immunologic/allergic: denies: HIV/AIDS, transplant








EXAM:


GENERAL:  Well-appearing, well-nourished and in no acute distress.


HEAD:  Atraumatic, normocephalic.


EYES:  Pupils equal round and reactive to light, extraocular movements intact, 

sclera anicteric, conjunctiva are normal.


ENT:  TMs normal, nares patent, oropharynx clear without exudates.  Moist 

mucous membranes.


NECK:  Normal range of motion, supple without lymphadenopathy or JVD.


LUNGS:  Breath sounds clear to auscultation bilaterally and equal.  No wheezes 

rales or rhonchi.


HEART:  Regular rate and rhythm without murmurs, rubs or gallops.


ABDOMEN:  Soft, nontender, normoactive bowel sounds.  No guarding, no rebound.  

No masses appreciated.


BACK:  No CVA tenderness, no spinal tenderness, step-offs or deformities


EXTREMITIES:  Normal range of motion, no pitting or edema.  No clubbing or 

cyanosis.


NEUROLOGICAL:  Cranial nerves II through XII grossly intact.  Normal speech, 

normal gait.  5/5 strength, normal movement in all extremities, normal sensation


PSYCH:  Normal mood, normal affect.


SKIN:  Warm, dry, normal turgor, no visible rashes or lesions.








Source: Patient, EMS, Old records





- Personal History


Current Tetanus/Diphtheria Vaccine: Yes


Current Tetanus Diphtheria and Acellular Pertussis (TDAP): Yes


Tetanus Vaccine Date: 3/2010





- Medical/Surgical History


Hx Asthma: No


Hx Chronic Respiratory Disease: No


Hx Diabetes: No


Hx Cardiac Disease: Yes


Hx Renal Disease: Yes


Hx Cirrhosis: No


Hx Alcoholism: No


Hx HIV/AIDS: No


Hx Splenectomy or Spleen Trauma: No


Other PMH: 1. Chronic systolic heart failure ejection fraction of 25%.  

hyperlipidemia.  2. Coronary artery disease status post stenting on 11/16/2016.

  3. Chronic atrial fibrillation.  4. Hypertension.  5. Chronic renal 

insufficiency.  6. Nephrolithiasis with ureteral stent placement November of 2016.  7. Gout.  8. Right total knee arthroplasty.  9. Appendectomy.  10. 

kidney stones





- Family History


Significant Family History: No pertinent family hx





- Social History


Smoking Status: Never smoked


Alcohol Use: Sober


Drug Use: None


Constitutional: 


 Initial Vital Signs











Temperature (C)  36.8 C   07/31/17 13:20


 


Heart Rate  94   07/31/17 13:20


 


Respiratory Rate  16   07/31/17 13:20


 


Blood Pressure  109/86 H  07/31/17 13:20


 


O2 Sat (%)  94   07/31/17 13:20








 











O2 Delivery Mode               Room Air














Allergies/Adverse Reactions: 


 





atorvastatin calcium [From Lipitor] Allergy (Severe, Verified 07/16/17 02:03)


 Other-Enter Comments


haloperidol [From Haldol] Allergy (Verified 07/16/17 02:03)


 


simvastatin Allergy (Verified 07/16/17 02:03)


 








Home Medications: 














 Medication  Instructions  Recorded


 


Acetaminophen [Tylenol 325mg (*)] 325 - 650 mg PO Q4H PRN 05/12/17


 


Allopurinol [Allopurinol 300  mg PO DAILY 05/12/17





(RX)]  


 


Clopidogrel Bisulfate [Plavix (*)] 75 mg PO DAILY 05/12/17


 


Digoxin [Lanoxin 125 mcg (RX)] 125 mcg PO HS 05/12/17


 


Furosemide [Lasix 40 MG (*)] 40 mg PO DAILY 05/12/17


 


Losartan Potassium [Cozaar 25 mg 12.5 mg PO DAILY 05/12/17





(*)]  


 


Metoprolol Succinate Xr [Toprol Xl 100 mg PO DAILY 05/12/17





100 mg (*)]  


 


Nitroglycerin [Nitrostat 0.4 mg 0.4 mg SL AD PRN 05/12/17





(*)]  


 


Spironolactone [Aldactone 25 MG 12.5 mg PO DAILY 05/12/17





(*)]  


 


Warfarin Sodium [Coumadin 5MG (*)] 5 mg PO SUMOWETHFR@16 05/12/17


 


oxyCODONE IR [Oxycodone Ir (*)] 2.5 - 5 mg PO Q4H PRN 05/12/17


 


Furosemide [Lasix 40 MG (*)] 20 mg PO DAILY@16 05/20/17


 


Warfarin Sodium [Coumadin 5MG (*)] 7.5 mg PO TUSA@16 05/20/17














Medical Decision Making





- Diagnostics


EKG Interpretation: 





An EKG obtained and was read and documented in trace view.  Please see trace 

view for full reading and report.   atrial fibrillation, bundle branch block, 

unchanged from previous 


ED Course/Re-evaluation: 





The patient is here with frequent recurring symptoms. We will observe.  He has 

stable vital signs. I will check an EKG and chest x-ray.








2:00 p.m. the patient we discussed the patient's EKG and chest x-ray.  He 

states that he feels completely better.  His vital signs are stable. I 

discussed the case with [facility].  I suggested he switch 

back to Lasix.  Patient agrees with this and will follow up with his 

cardiologist Dr. Agarwal.  We discussed indications for returning.


Differential Diagnosis: 





Partial list of the Differential diagnosis considered include but were not 

limited to;   anxiety, medication reaction, and although unlikely based on the 

history and physical exam, I also considered acute coronary disease, CHF 

exacerbation, COPD, PE.  I discussed these differential diagnoses and the plan 

with the patient as well as the usual and expected course.  The patient 

understands that the diagnosis is provisional and that in medicine we are not 

always correct and that further workup is often warranted.  Usual and customary 

warnings were given.  All of the patient's questions were answered.  The 

patient was instructed to return to the emergency department should the 

symptoms at all worsen or return, otherwise to followup with the physician as 

we discussed.





- Data Points


Laboratory Results: 


 Laboratory Results





 07/31/17 13:20 





 07/31/17 13:20 











Departure





- Departure


Disposition: Home, Routine, Self-Care


Clinical Impression: 


 Anxiety about blushing, Lightheaded





Medication reaction


Qualifiers:


 Encounter type: initial encounter Qualified Code(s): T88.7XXA - Unspecified 

adverse effect of drug or medicament, initial encounter





Condition: Fair


Instructions:  Lightheadedness (ED)


Referrals: 


Patient,NotPresent [Unknown] - As per Instructions


[facility] ,. [Edm Groups for Call Sched] - As per Instructions

## 2017-07-31 NOTE — CPEKG
Heart Rate: 99

RR Interval: 606

QRSD Interval: 166

QT Interval: 444

QTC Interval: 570

QRS Axis: 110

T Wave Axis: -61

EKG Severity - ABNORMAL ECG -

EKG Impression: ATRIAL FIBRILLATION, V-RATE 

EKG Impression: RBBB AND LPFB

EKG Impression: BORDERLINE ST DEPRESSION, LATERAL LEADS

EKG Impression: unchanged from previous

Electronically Signed By: Rubén Grimm 31-Jul-2017 13:36:28

## 2017-08-07 ENCOUNTER — HOSPITAL ENCOUNTER (OUTPATIENT)
Dept: HOSPITAL 80 - FED | Age: 77
Setting detail: OBSERVATION
LOS: 2 days | Discharge: HOME | End: 2017-08-09
Attending: INTERNAL MEDICINE | Admitting: INTERNAL MEDICINE
Payer: COMMERCIAL

## 2017-08-07 DIAGNOSIS — G92: ICD-10-CM

## 2017-08-07 DIAGNOSIS — I50.22: ICD-10-CM

## 2017-08-07 DIAGNOSIS — I48.2: ICD-10-CM

## 2017-08-07 DIAGNOSIS — M10.9: ICD-10-CM

## 2017-08-07 DIAGNOSIS — F41.9: ICD-10-CM

## 2017-08-07 DIAGNOSIS — Z79.01: ICD-10-CM

## 2017-08-07 DIAGNOSIS — R79.9: ICD-10-CM

## 2017-08-07 DIAGNOSIS — E78.5: ICD-10-CM

## 2017-08-07 DIAGNOSIS — I11.0: ICD-10-CM

## 2017-08-07 DIAGNOSIS — Z95.0: ICD-10-CM

## 2017-08-07 DIAGNOSIS — Z95.5: ICD-10-CM

## 2017-08-07 DIAGNOSIS — I25.10: ICD-10-CM

## 2017-08-07 DIAGNOSIS — Z96.661: ICD-10-CM

## 2017-08-07 DIAGNOSIS — Z87.442: ICD-10-CM

## 2017-08-07 DIAGNOSIS — N18.9: ICD-10-CM

## 2017-08-07 DIAGNOSIS — T42.4X1A: Primary | ICD-10-CM

## 2017-08-07 DIAGNOSIS — I25.5: ICD-10-CM

## 2017-08-07 LAB
% IMMATURE GRANULYOCYTES: 0.1 % (ref 0–1.1)
ABSOLUTE IMMATURE GRANULOCYTES: 0.01 10^3/UL (ref 0–0.1)
ABSOLUTE NRBC COUNT: 0 10^3/UL (ref 0–0.01)
ADD DIFF?: NO
ADD MORPH?: NO
ADD SCAN?: NO
ANION GAP SERPL CALC-SCNC: 17 MEQ/L (ref 8–16)
APTT BLD: 49.1 SEC (ref 23–38)
ATYPICAL LYMPHOCYTE FLAG: 0 (ref 0–99)
CALCIUM SERPL-MCNC: 9.1 MG/DL (ref 8.5–10.4)
CHLORIDE SERPL-SCNC: 104 MEQ/L (ref 97–110)
CO2 SERPL-SCNC: 19 MEQ/L (ref 22–31)
CREAT SERPL-MCNC: 1.4 MG/DL (ref 0.7–1.3)
ERYTHROCYTE [DISTWIDTH] IN BLOOD BY AUTOMATED COUNT: 14.8 % (ref 11.5–15.2)
FRAGMENT RBC FLAG: 0 (ref 0–99)
GFR SERPL CREATININE-BSD FRML MDRD: 49 ML/MIN/{1.73_M2}
GLUCOSE SERPL-MCNC: 164 MG/DL (ref 70–100)
HCT VFR BLD CALC: 41.7 % (ref 40–51)
HGB BLD-MCNC: 13.5 G/DL (ref 13.7–17.5)
INR PPP: 3.4 (ref 0.83–1.16)
LEFT SHIFT FLG: 0 (ref 0–99)
LIPEMIA HEMOLYSIS FLAG: 80 (ref 0–99)
MAGNESIUM SERPL-MCNC: 2 MG/DL (ref 1.6–2.3)
MCH RBC BLDCO QN: 29.3 PG (ref 27.9–34.1)
MCHC RBC AUTO-ENTMCNC: 32.4 G/DL (ref 32.4–36.7)
MCV RBC AUTO: 90.5 FL (ref 81.5–99.8)
NRBC-AUTO%: 0 % (ref 0–0.2)
PLATELET # BLD: 210 10^3/UL (ref 150–400)
PLATELET CLUMPS FLAG: 0 (ref 0–99)
PMV BLD AUTO: 10.1 FL (ref 8.7–11.7)
POTASSIUM SERPL-SCNC: 3.9 MEQ/L (ref 3.5–5.2)
PROTHROMBIN TIME: 34.9 SEC (ref 12–15)
RBC # BLD AUTO: 4.61 10^6/UL (ref 4.4–6.38)
SODIUM SERPL-SCNC: 140 MEQ/L (ref 134–144)
TROPONIN I SERPL-MCNC: 0.09 NG/ML (ref 0–0.03)

## 2017-08-07 PROCEDURE — 99285 EMERGENCY DEPT VISIT HI MDM: CPT

## 2017-08-07 PROCEDURE — 93005 ELECTROCARDIOGRAM TRACING: CPT

## 2017-08-07 PROCEDURE — 71020: CPT

## 2017-08-07 PROCEDURE — 97165 OT EVAL LOW COMPLEX 30 MIN: CPT

## 2017-08-07 PROCEDURE — G0378 HOSPITAL OBSERVATION PER HR: HCPCS

## 2017-08-07 NOTE — EDPHY
H & P


Constitutional: 


 Initial Vital Signs











Temperature (C)  36.7 C   08/07/17 21:15


 


Heart Rate  88   08/07/17 21:15


 


Respiratory Rate  20   08/07/17 21:15


 


Blood Pressure  117/81 H  08/07/17 21:15


 


O2 Sat (%)  94   08/07/17 21:15








 











O2 Delivery Mode               Nasal Cannula


 


O2 (L/minute)                  2














Allergies/Adverse Reactions: 


 





atorvastatin calcium [From Lipitor] Allergy (Severe, Verified 08/07/17 21:18)


 Other-Enter Comments


haloperidol [From Haldol] Allergy (Verified 08/07/17 21:18)


 


simvastatin Allergy (Verified 08/07/17 21:18)


 








Home Medications: 














 Medication  Instructions  Recorded


 


Acetaminophen [Tylenol 325mg (*)] 325 - 650 mg PO Q4H PRN 05/12/17


 


Allopurinol [Allopurinol 300  mg PO DAILY 05/12/17





(RX)]  


 


Clopidogrel Bisulfate [Plavix (*)] 75 mg PO DAILY 05/12/17


 


Digoxin [Lanoxin 125 mcg (RX)] 125 mcg PO HS 05/12/17


 


Furosemide [Lasix 40 MG (*)] 40 mg PO DAILY 05/12/17


 


Losartan Potassium [Cozaar 25 mg 12.5 mg PO DAILY 05/12/17





(*)]  


 


Metoprolol Succinate Xr [Toprol Xl 100 mg PO DAILY 05/12/17





100 mg (*)]  


 


Nitroglycerin [Nitrostat 0.4 mg 0.4 mg SL AD PRN 05/12/17





(*)]  


 


Spironolactone [Aldactone 25 MG 12.5 mg PO DAILY 05/12/17





(*)]  


 


Warfarin Sodium [Coumadin 5MG (*)] 5 mg PO SUMOWETHFR@16 05/12/17


 


oxyCODONE IR [Oxycodone Ir (*)] 2.5 - 5 mg PO Q4H PRN 05/12/17


 


Furosemide [Lasix 40 MG (*)] 20 mg PO DAILY@16 05/20/17


 


Warfarin Sodium [Coumadin 5MG (*)] 7.5 mg PO TUSA@16 05/20/17














Medical Decision Making





- Diagnostics


Imaging: I viewed and interpreted images myself


ED Course/Re-evaluation: 





CHIEF COMPLAINT:  Shortness of breath, dizziness. 





HISTORY OF PRESENT ILLNESS: 





This patient is an anticoagulated (Coumadin) 76 year old male arriving via EMS 

from home complaining of shortness of breath, dizziness, and "foggy" 

discomfort. Per EMS, his EKG in transport showed atrial fibrillation with PVCs, 

but patient had stable vitals and was mentating well. He had steady gait and no 

focal findings on exam. The patient reports he has been feeling fatigued and 

short of breath since yesterday afternoon. He has been hospitalized recently 

for heart failure. He has also had a recent diagnosis of anxiety, and began 

taking Ativan 0.5mg on Thursday, four days ago. Saturday, he took 1mg, and felt 

everything went "haywire" - he was unable to sleep, was very confused, did not 

know who he was, and generally felt poorly. His last dos was this morning at 7:

00. He had a change to his cardiology medication a few weeks ago, which also 

caused adverse symptoms, but he has returned to his original medications. 

Yesterday morning, his wife passed away. He developed shortness of breath and 

increased dizziness and fogginess following this news. He has felt very 

fatigued.  No fever, nausea, vomiting, or other associated symptoms. 





REVIEW OF SYSTEMS:  





A 10 point review of systems was performed and is negative with the exception 

of the elements mentioned in the history of present illness.





PHYSICAL EXAM:  





HR, BP, O2 Sat, RR.  Temp noted


General Appearance:  Alert, well hydrated, appropriate, and non-toxic appearing.


Head:  Atraumatic without scalp tenderness or obvious injury


Eyes:  Pupils equal, round, reactive to light and accommodation, EOMI, no trauma

, no injection.


Ears:  Clear bilaterally, no perforation, normal landmarks


Nose:  Atraumatic, no rhinorrhea, clear.


Throat:  There is no erythema or exudates, no lesions, normal tonsils, mucus 

membranes moist.


Neck:  Supple, 2+ carotid upstroke, nontender, no lymphadenopathy.


Respiratory:  No retractions, no distress, no wheezes, and no accessory muscle 

use.  Lungs are clear to auscultation bilaterally.


Cardiovascular:  Regular rate and rhythm, no murmurs, rubs, or gallops. 

Bilateral carotid, radial, dorsalis pedis, and posterior tibial pulses intact. 

Good capillary refill all extremities.


Gastrointestinal:  Abdomen is soft, nontender, non-distended, no masses, no 

rebound, no guarding, no peritoneal signs.


Musculoskeletal:  Normal active ROM of all extremities, atraumatic.


Neurological:  Alert, appropriate, and interactive.  The patient has normal 

DTRs and non-focal cranial nerves, motor, sensory, and cerebellar exam.


Skin:  No rashes, good turgor, no nodules on palpation.





Past medical history: Atrial fibrillation, chronic systolic heart failure, 

Hyperlipidemia,  Coronary artery disease, Hypertension, Chronic renal 

insufficiency, Nephrolithiasis, Gout


Past surgical history: Right total knee arthroplasty, Appendectomy, Kidney 

stones


Family history: Noncontributory


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 

service.  Lives in [place]. 





DIFFERENTIAL DIAGNOSIS: 


Includes but not limited to Takotsubo cardiomyopathy, congestive heart failure, 

myocardial infarction, pulmonary embolism, severe anxiety related to grieving. 

  





MEDICAL DECISION MAKING: 





The 12 lead EKG was interpreted by myself. See hard copy and/or "tracemaster" 

electronic copy for interpretation. Atrial fibrillation. Ventricular rate 101. 

Right bundle branch block. 





Plan to admit for renal insufficiency, CHF exacerbation, elevated troponin at 

0.89, grieving, possible Takotsubo. The patient feels he is unable to care well 

for himself this evening and is comfortable with admission for continued 

observation and management of symptoms. 





22:16 Spoke with hospitalist service. Dr. Griggs accepts admission.  

















- Data Points


Laboratory Results: 


 Laboratory Results





 08/07/17 21:20 





 08/07/17 16:20 





 











  08/07/17 08/07/17 08/07/17





  21:20 16:20 16:20


 


WBC  7.83 10^3/uL 10^3/uL    





   (3.80-9.50)   


 


RBC  4.61 10^6/uL 10^6/uL    





   (4.40-6.38)   


 


Hgb  13.5 g/dL L g/dL    





   (13.7-17.5)   


 


Hct  41.7 % %    





   (40.0-51.0)   


 


MCV  90.5 fL fL    





   (81.5-99.8)   


 


MCH  29.3 pg pg    





   (27.9-34.1)   


 


MCHC  32.4 g/dL g/dL    





   (32.4-36.7)   


 


RDW  14.8 % %    





   (11.5-15.2)   


 


Plt Count  210 10^3/uL 10^3/uL    





   (150-400)   


 


MPV  10.1 fL fL    





   (8.7-11.7)   


 


Neut % (Auto)  64.2 % %    





   (39.3-74.2)   


 


Lymph % (Auto)  26.3 % %    





   (15.0-45.0)   


 


Mono % (Auto)  6.6 % %    





   (4.5-13.0)   


 


Eos % (Auto)  1.8 % %    





   (0.6-7.6)   


 


Baso % (Auto)  1.0 % %    





   (0.3-1.7)   


 


Nucleat RBC Rel Count  0.0 % %    





   (0.0-0.2)   


 


Absolute Neuts (auto)  5.02 10^3/uL 10^3/uL    





   (1.70-6.50)   


 


Absolute Lymphs (auto)  2.06 10^3/uL 10^3/uL    





   (1.00-3.00)   


 


Absolute Monos (auto)  0.52 10^3/uL 10^3/uL    





   (0.30-0.80)   


 


Absolute Eos (auto)  0.14 10^3/uL 10^3/uL    





   (0.03-0.40)   


 


Absolute Basos (auto)  0.08 10^3/uL 10^3/uL    





   (0.02-0.10)   


 


Absolute Nucleated RBC  0.00 10^3/uL 10^3/uL    





   (0-0.01)   


 


Immature Gran %  0.1 % %    





   (0.0-1.1)   


 


Immature Gran #  0.01 10^3/uL 10^3/uL    





   (0.00-0.10)   


 


D-Dimer      < 0.27 ug/mLFEU ug/mLFEU





     (0.00-0.50) 


 


Sodium    140 mEq/L mEq/L  





    (134-144)  


 


Potassium    3.9 mEq/L mEq/L  





    (3.5-5.2)  


 


Chloride    104 mEq/L mEq/L  





    ()  


 


Carbon Dioxide    19 mEq/l L mEq/l  





    (22-31)  


 


Anion Gap    17 mEq/L H mEq/L  





    (8-16)  


 


BUN    31 mg/dL H mg/dL  





    (7-23)  


 


Creatinine    1.4 mg/dL H mg/dL  





    (0.7-1.3)  


 


Estimated GFR    49   





    


 


Glucose    164 mg/dL H mg/dL  





    ()  


 


Calcium    9.1 mg/dL mg/dL  





    (8.5-10.4)  


 


Magnesium    2.0 mg/dL mg/dL  





    (1.6-2.3)  


 


Troponin I    0.089 ng/mL H ng/mL  





    (0-0.034)  


 


NT-Pro-B Natriuret Pep    99201 pg/mL H pg/mL  





    (0-450)  














Departure





- Departure


Disposition: [facility] Inpatient Acute


Clinical Impression: 


 Renal insufficiency, Shortness of breath





Condition: Fair


Referrals: 


TIFFANY COVARRUBIAS [Other] - As per Instructions


Report Scribed for: Abdias Herndon


Report Scribed by: Tanya Adames


Date of Report: 08/07/17


Time of Report: 21:24

## 2017-08-07 NOTE — CPEKG
Heart Rate: 101

RR Interval: 594

QRSD Interval: 164

QT Interval: 428

QTC Interval: 555

QRS Axis: 83

T Wave Axis: -28

EKG Severity - ABNORMAL ECG -

EKG Impression: ATRIAL FIBRILLATION, V-RATE 

EKG Impression: RIGHT BUNDLE BRANCH BLOCK

Electronically Signed By: Jimbo White 10-Aug-2017 08:05:49

## 2017-08-07 NOTE — PDGENHP
History and Physical





- Chief Complaint


Confusion





- History of Present Illness


75 yo M w/ CHF, Afib presents with confusion after new start of lorazepam. 

Patient's wife has been very ill with cancer for some time and passed away on 

the day prior to presentation. For the last weeks or months, the patient has 

had intermittent bouts of unexplained shortness of breath, which his doctor 

felt may be related to anxiety. These symptoms worsened over the last week as 

his wife neared end of life and he was prescribed lorazepam for the first time. 

Since starting lorazepam, patient felt confused, dizzy, disoriented, and had 

difficulty controlling his bladder. His last dose was early on the morning of 

admission, and he reports these symptoms have completely resolved in the 

interim. Currently he denies acute symptoms but feels afraid to go home. 





He weighs himself twice every day and his weight has been stable at 176 pounds 

for some time. He is on a stable dose of diuretics and has no other medication 

changes aside from the aforementioned lorazepam and a recent start of warfarin. 

He denies chest pain, current SOB, and all infectious symptoms. 





History Information





- Allergies/Home Medication List


Allergies/Adverse Reactions: 








atorvastatin calcium [From Lipitor] Allergy (Severe, Verified 17 21:18)


 Other-Enter Comments


haloperidol [From Haldol] Allergy (Verified 17 21:18)


 


simvastatin Allergy (Verified 17 21:18)


 





Home Medications: 








Acetaminophen [Tylenol 325mg (*)] 325 - 650 mg PO Q4H PRN 17 [Last Taken 

Unknown]


Allopurinol [Allopurinol 300 MG (RX)] 150 mg PO DAILY 17 [Last Taken  08:00]


Clopidogrel Bisulfate [Plavix (*)] 75 mg PO DAILY 17 [Last Taken 17 

08:00]


Digoxin [Lanoxin 125 mcg (RX)] 125 mcg PO HS 17 [Last Taken 17 21:00

]


Furosemide [Lasix 40 MG (*)] 40 mg PO DAILY 17 [Last Taken 17 08:00]


Losartan Potassium [Cozaar 25 mg (*)] 12.5 mg PO DAILY 17 [Last Taken  08:00]


Metoprolol Succinate Xr [Toprol Xl 100 mg (*)] 100 mg PO DAILY 17 [Last 

Taken 17 08:00]


Nitroglycerin [Nitrostat 0.4 mg (*)] 0.4 mg SL AD PRN 17 [Last Taken 

Unknown]


Spironolactone [Aldactone 25 MG (*)] 12.5 mg PO DAILY 17 [Last Taken  08:00]


Warfarin Sodium [Coumadin 5MG (*)] 5 mg PO SUMOWETHFR@17 [Last Taken ]


oxyCODONE IR [Oxycodone Ir (*)] 2.5 - 5 mg PO Q4H PRN 17 [Last Taken 

Unknown]


Furosemide [Lasix 40 MG (*)] 20 mg PO DAILY@17 [Last Taken 17 16:

00]


Warfarin Sodium [Coumadin 5MG (*)] 7.5 mg PO TUSA@17 [Last Taken ]





I have personally reviewed and updated: family history, medical history





- Past Medical History


atrial fibrillation (  Permanent), coronary artery disease ( with LAD stent 

2016, ischemic cardiomyopathy), CHF ( systolic with ejection 

fraction 25%)


Additional medical history: nonobstructive CAD.  Afib on systemic 

anticoagulation.  HTN.  gout.  nephrolithiasis with recent stent placed.  

history of left atrial thrombus.  Cognitive impairment





- Surgical History


Additional surgical history: R total knee replacement.  appendectomy.  Recent 

ureteral stent placement.  LAD stent 2016





- Family History


Additional family history: Pt reports every male family member has  of CAD/

heart disease





- Social History


Smoking Status: Never smoked


Additional social history: Retired lives alone, reportedly is independent in 

ADLs.





Review of Systems


ROS: 10pt was reviewed & negative except for what was stated in HPI & below





Physical Exam














Temp Pulse Resp BP Pulse Ox


 


 36.7 C   91   21 H  112/86 H  95 


 


 17 21:15  17 22:48  17 22:48  17 22:48  17 22:48




















O2 (L/minute)                  2














Constitutional: no apparent distress, appears nourished


Eyes: PERRL, EOMI


Ears, Nose, Mouth, Throat: moist mucous membranes, no oral mucosal ulcers


Cardiovascular: regular rate and rhythym, systolic murmur (2/6 ARIEL @ LUSB), No 

edema


Respiratory: no respiratory distress, clear to auscultation


Gastrointestinal: normoactive bowel sounds, soft, non-tender abdomen


Skin: warm, no rashes or abrasions


Neurologic: AAOx3, CN II-XII Intact


Psychiatric: interacting appropriately, not anxious





Lab Data & Imaging Review





 17 21:20





 17 21:20














WBC  7.83 10^3/uL (3.80-9.50)   17  21:20    


 


RBC  4.61 10^6/uL (4.40-6.38)   17  21:20    


 


Hgb  13.5 g/dL (13.7-17.5)  L  17  21:20    


 


Hct  41.7 % (40.0-51.0)   17  21:20    


 


MCV  90.5 fL (81.5-99.8)   17  21:20    


 


MCH  29.3 pg (27.9-34.1)   17  21:20    


 


MCHC  32.4 g/dL (32.4-36.7)   17  21:20    


 


RDW  14.8 % (11.5-15.2)   17  21:20    


 


Plt Count  210 10^3/uL (150-400)   17  21:20    


 


MPV  10.1 fL (8.7-11.7)   17  21:20    


 


Neut % (Auto)  64.2 % (39.3-74.2)   17  21:20    


 


Lymph % (Auto)  26.3 % (15.0-45.0)   17  21:20    


 


Mono % (Auto)  6.6 % (4.5-13.0)   17  21:20    


 


Eos % (Auto)  1.8 % (0.6-7.6)   17  21:20    


 


Baso % (Auto)  1.0 % (0.3-1.7)   17  21:20    


 


Nucleat RBC Rel Count  0.0 % (0.0-0.2)   17  21:20    


 


Absolute Neuts (auto)  5.02 10^3/uL (1.70-6.50)   17  21:20    


 


Absolute Lymphs (auto)  2.06 10^3/uL (1.00-3.00)   17  21:20    


 


Absolute Monos (auto)  0.52 10^3/uL (0.30-0.80)   17  21:20    


 


Absolute Eos (auto)  0.14 10^3/uL (0.03-0.40)   17  21:20    


 


Absolute Basos (auto)  0.08 10^3/uL (0.02-0.10)   17  21:20    


 


Absolute Nucleated RBC  0.00 10^3/uL (0-0.01)   17  21:20    


 


Immature Gran %  0.1 % (0.0-1.1)   17  21:20    


 


Immature Gran #  0.01 10^3/uL (0.00-0.10)   17  21:20    


 


D-Dimer  < 0.27 ug/mLFEU (0.00-0.50)   17  21:20    


 


Sodium  140 mEq/L (134-144)   17  21:20    


 


Potassium  3.9 mEq/L (3.5-5.2)   17  21:20    


 


Chloride  104 mEq/L ()   17  21:20    


 


Carbon Dioxide  19 mEq/l (22-31)  L  17  21:20    


 


Anion Gap  17 mEq/L (8-16)  H  17  21:20    


 


BUN  31 mg/dL (7-23)  H  17  21:20    


 


Creatinine  1.4 mg/dL (0.7-1.3)  H  17  21:20    


 


Estimated GFR  49   17  21:20    


 


Glucose  164 mg/dL ()  H  17  21:20    


 


Calcium  9.1 mg/dL (8.5-10.4)   17  21:20    


 


Magnesium  2.0 mg/dL (1.6-2.3)   17  21:20    


 


Troponin I  0.089 ng/mL (0-0.034)  H  17  21:20    


 


NT-Pro-B Natriuret Pep  62011 pg/mL (0-450)  H  17  21:20    








EKG additional interpertation: Rate controlled atrial fibrillation





Assessment & Plan


Assessment: 


75 yo M w/ CHF, Afib, CKD, gout presents with confusion after recently starting 

lorazepam.


Plan: 


1. Acute toxic encephalopathy - Resolved by time of admission. Caused by newly 

started lorazepam. Patient under significant emotional stress noting the death 

of his wife on the day prior to admission. He is afraid to go home so he will 

be admitted for observation. 


2. Indeterminate troponin - In the setting of stable renal insufficiency. He 

denies any symptoms of chest pain or shortness of breath. ECG without acute 

ischemia.


- Will recheck troponin in the morning


3. Chronic systolic heart failure without decompensation - Patient on stable 

medication and diuretic regimen; weight unchanged for weeks at 176 lbs. Patient 

watches his diet and fluid intake very carefully and overall appears very well 

compensated despite BNP of 14,000.


- Continue home medications


4. Afib - On metoprolol and warfarin


5. Gout - On allopurinol





Diet - Cardiac


Code - Full


Ppx - Warfarin


Dispo - Admit to observation, patient should be able to discharge home tomorrow 

without issue

## 2017-08-08 LAB
% IMMATURE GRANULYOCYTES: 0.1 % (ref 0–1.1)
ABSOLUTE IMMATURE GRANULOCYTES: 0.01 10^3/UL (ref 0–0.1)
ABSOLUTE NRBC COUNT: 0 10^3/UL (ref 0–0.01)
ADD DIFF?: NO
ADD MORPH?: NO
ADD SCAN?: NO
ANION GAP SERPL CALC-SCNC: 13 MEQ/L (ref 8–16)
ATYPICAL LYMPHOCYTE FLAG: 0 (ref 0–99)
CALCIUM SERPL-MCNC: 9.1 MG/DL (ref 8.5–10.4)
CHLORIDE SERPL-SCNC: 109 MEQ/L (ref 97–110)
CO2 SERPL-SCNC: 18 MEQ/L (ref 22–31)
CREAT SERPL-MCNC: 1.3 MG/DL (ref 0.7–1.3)
ERYTHROCYTE [DISTWIDTH] IN BLOOD BY AUTOMATED COUNT: 14.6 % (ref 11.5–15.2)
FRAGMENT RBC FLAG: 0 (ref 0–99)
GFR SERPL CREATININE-BSD FRML MDRD: 54 ML/MIN/{1.73_M2}
GLUCOSE SERPL-MCNC: 107 MG/DL (ref 70–100)
HCT VFR BLD CALC: 39.5 % (ref 40–51)
HGB BLD-MCNC: 12.7 G/DL (ref 13.7–17.5)
LEFT SHIFT FLG: 0 (ref 0–99)
LIPEMIA HEMOLYSIS FLAG: 80 (ref 0–99)
MCH RBC BLDCO QN: 29.2 PG (ref 27.9–34.1)
MCHC RBC AUTO-ENTMCNC: 32.2 G/DL (ref 32.4–36.7)
MCV RBC AUTO: 90.8 FL (ref 81.5–99.8)
NRBC-AUTO%: 0 % (ref 0–0.2)
PLATELET # BLD: 180 10^3/UL (ref 150–400)
PLATELET CLUMPS FLAG: 0 (ref 0–99)
PMV BLD AUTO: 10.3 FL (ref 8.7–11.7)
POTASSIUM SERPL-SCNC: 4 MEQ/L (ref 3.5–5.2)
RBC # BLD AUTO: 4.35 10^6/UL (ref 4.4–6.38)
SODIUM SERPL-SCNC: 140 MEQ/L (ref 134–144)
TROPONIN I SERPL-MCNC: 0.09 NG/ML (ref 0–0.03)

## 2017-08-08 RX ADMIN — METOPROLOL SUCCINATE SCH MG: 100 TABLET, EXTENDED RELEASE ORAL at 10:37

## 2017-08-08 RX ADMIN — SPIRONOLACTONE SCH MG: 25 TABLET, FILM COATED ORAL at 10:35

## 2017-08-08 RX ADMIN — FUROSEMIDE SCH MG: 40 TABLET ORAL at 14:01

## 2017-08-08 RX ADMIN — CLOPIDOGREL BISULFATE SCH MG: 75 TABLET, FILM COATED ORAL at 10:37

## 2017-08-08 RX ADMIN — LOSARTAN POTASSIUM SCH MG: 25 TABLET, FILM COATED ORAL at 10:36

## 2017-08-08 NOTE — HOSPPROG
Hospitalist Progress Note


Assessment/Plan: 


76-year-old with a history of coronary artery disease and ischemic 

cardiomyopathy with EF of 25%, atrial fibrillation with pacemaker is admitted 

with confusion.  He has a history of recurrent hospitalizations and ER visits 

for shortness of breath.  He is followed closely at [facility] by a cardiologist 

and by his primary care physician who have worked this up without clear 

etiology.  It is felt that his symptoms are likely related to anxiety since his 

wife has been terminally ill with cancer and  2 days prior to admission.\\





#  encephalopathy:  Likely metabolic and acute.  Secondary to medications 

likely lorazepam with underlying cognitive deficits.  Will also check a digoxin 

level.  He has improved today although is still unsteady on his feet


* Check digoxin level


* PT OT evaluation when at baseline or more stable can be discharged home





#  anxiety:  Patient recently started on lorazepam, I suspect that his 

admitting confusion is related to this as he does have some underlying 

cognitive deficits.  He says he is feeling better today but still unsteady on 

his feet.


* Discontinue lorazepam, follow up with behavior Health at [facility] his 

appointment on 





#  shortness of breath:  I suspect this is related to anxiety.  He likely has 

some chronic shortness of breath due to his ischemic cardiomyopathy as well.


* Continue current meds except for lorazepam





#  elevated troponins, flat in the setting of chronic ischemic systolic 

failure.  No further workup as he has been followed closely by Cardiology at 

[facility].  He currently has no chest pain. He has no ischemic changes on EKG.


* Suspect his troponin is chronically elevated.


* No workup for acute ischemia indicated at this time.





#  atrial fibrillation, permanent on anticoagulation with Coumadin with 

therapeutic/supratherapeutic INR





#  coronary artery disease:  Recent LAD stent in 2016





#  ischemic cardiomyopathy with chronic systolic heart failure, chest x-ray 

personally reviewed and shows chronic findings.  Continue usual medications.





#  hypertension:  Blood pressure slightly elevated will treat as needed with IV 

medications and continue his usual medications.





#  gout currently on allopurinol





**Pt needs additional midnight stay, worked with PT and still unsteady due to 

encephalopathy and co morbidities, will continue to hold lorazepam and work 

with PT for possible dc when safe.  Consider behavioral health specialist to see

, Tiny Stockton, if she is in. 




















Subjective: Patient new to me chart reviewed.  Patient feeling better today 

although still feels unsteady and slightly foggy.  No significant changes in 

his symptoms.  He says he is continuously short of breath but his vital signs 

are stable.


Objective: 


 Vital Signs











Temp Pulse Resp BP Pulse Ox


 


 36.7 C   85   19   123/74 H  93 


 


 17 07:40  17 07:40  17 07:40  17 07:40  17 07:40








 Laboratory Results





 17 04:41 





 17 04:41 





 











 17





 05:59 05:59 05:59


 


Intake Total  350 


 


Output Total  150 225


 


Balance  200 -225








 











PT  34.9 SEC (12.0-15.0)  H  17  21:20    


 


INR  3.40  (0.83-1.16)  H  17  21:20    














- Physical Exam


Constitutional: not in pain, chronically ill appearing


Eyes: PERRL, EOMI


Ears, Nose, Mouth, Throat: hearing normal


Cardiovascular: regular rate and rhythym, systolic murmur


Respiratory: no respiratory distress, reduced air movement


Gastrointestinal: normoactive bowel sounds, soft, non-tender abdomen, no 

palpable masses


Skin: warm


Musculoskeletal: generalized weakness


Psychiatric: interacting appropriately, anxious





ICD10 Worksheet


Patient Problems: 


 Problems











Problem Status Onset


 


Near syncope Acute  


 


Confusion with nonfocal neurological examination Acute  


 


Chest pain Acute  


 


Congestive heart failure Acute  


 


Chest pain at rest Acute  


 


Cardiomyopathy Acute  


 


Acute exacerbation of CHF (congestive heart failure) Acute  


 


Constipation Acute  


 


Atrial fibrillation with rapid ventricular response Acute  


 


Nephrolithiasis Acute  


 


Ureterolithiasis Acute  


 


Acute decompensated heart failure Acute  


 


Hematuria Acute  


 


Chest pain Acute  


 


Troponin level elevated Acute  


 


Shortness of breath Acute  


 


Syncope Acute  


 


Anxiety about blushing Acute  


 


Medication reaction Acute  


 


Lightheaded Acute  


 


Renal insufficiency Acute  


 


Shortness of breath Acute

## 2017-08-09 VITALS
DIASTOLIC BLOOD PRESSURE: 84 MMHG | TEMPERATURE: 97.6 F | SYSTOLIC BLOOD PRESSURE: 108 MMHG | OXYGEN SATURATION: 95 % | RESPIRATION RATE: 12 BRPM

## 2017-08-09 VITALS — HEART RATE: 102 BPM

## 2017-08-09 LAB
INR PPP: 3.02 (ref 0.83–1.16)
PROTHROMBIN TIME: 31.7 SEC (ref 12–15)

## 2017-08-09 RX ADMIN — SPIRONOLACTONE SCH MG: 25 TABLET, FILM COATED ORAL at 08:41

## 2017-08-09 RX ADMIN — METOPROLOL SUCCINATE SCH MG: 100 TABLET, EXTENDED RELEASE ORAL at 08:42

## 2017-08-09 RX ADMIN — LOSARTAN POTASSIUM SCH MG: 25 TABLET, FILM COATED ORAL at 08:43

## 2017-08-09 RX ADMIN — FUROSEMIDE SCH MG: 40 TABLET ORAL at 08:43

## 2017-08-09 RX ADMIN — CLOPIDOGREL BISULFATE SCH MG: 75 TABLET, FILM COATED ORAL at 08:43

## 2017-08-09 NOTE — GDS
[f rep st]



                                                             DISCHARGE SUMMARY





DISCHARGE DIAGNOSES:  Include:

1.  Acute encephalopathy thought secondary to benzodiazepines.

2.  Chronic heart failure.



HISTORY OF PRESENT ILLNESS:  A 76-year-old male with multiple cardiac comorbidities who presents to 
the hospital with complaints of somnolence and confusion.  For details of patient's initial presenta
tion, please see the history and physical dated 08/07/2017.



CONSULTATIVE SERVICES:  None.



PROCEDURES:  None.



HOSPITAL COURSE BY ISSUE:  

1.  Acute encephalopathy.  The patient reportedly was started by his outpatient care team on p.r.n. 
Ativan for what was presumed to be anxiety attacks.  Patient reports taking 4 doses of his Ativan un
til he realized that he was making no sense, unable to clearly communicate, manage himself at home. 
 The patient called 911 for assistance and was brought to the hospital for evaluation.  The patient 
was monitored for 48 hours at [facility] withholding all ongoing benzodiazepines and the patient is cleared
 on the day of disposition.  He will be discharged home with home nursing and re-initiation of his o
utpatient home health.

2.  Suspected underlying anxiety.  This has been discussed with the patient on previous hospitalizat
ions, particularly __________ pain.  The patient does have a scheduled appointment with outpatient p
sychiatry at [facility] on September 11, 2017.  We recommend he keep this appointment, and additionally,
 recommend that he not continue benzodiazepine use in the home.

3.  Chronic heart failure.  Patient was continued on all of his cardiac medications without alterati
on.



MEDICATIONS AT THE TIME OF DISPOSITION:  Please reference med rec printed on 08/09/2017.



PENDING STUDIES AT THE TIME OF DISPOSITION:  None.



FOLLOWUP APPOINTMENTS:  Include with home health care team through [facility] as well as home nursing in
itiated by [facility].  Patient additionally to follow with outpatient [facility] psychiatry and his [facility] PHYSICIAN. 



I spent greater than 30 minutes in the planning and coordination of this discharge.





Job #:  [id number]

## 2017-08-09 NOTE — PDIAF
- Diagnosis


Diagnosis: confusion


Code Status: Full Code





- Medication Management


Discharge Medications: 


 Medications to Continue on Transfer





Allopurinol [Allopurinol 300 MG (RX)] 150 mg PO DAILY 05/12/17 [Last Taken 08/07 /17]


Clopidogrel Bisulfate [Plavix (*)] 75 mg PO DAILY 05/12/17 [Last Taken 08/07/17]


Digoxin [Lanoxin 125 mcg (RX)] 125 mcg PO HS 05/12/17 [Last Taken 08/07/17]


Losartan Potassium [Cozaar 25 mg (*)] 12.5 mg PO DAILY 05/12/17 [Last Taken 08/ 07/17]


Metoprolol Succinate Xr [Toprol Xl 100 mg (*)] 100 mg PO DAILY 05/12/17 [Last 

Taken 08/07/17]


Nitroglycerin [Nitrostat 0.4 mg (*)] 0.4 mg SL AD PRN 05/12/17 [Last Taken 

Unknown]


Spironolactone [Aldactone 25 MG (*)] 12.5 mg PO DAILY 05/12/17 [Last Taken 08/07 /17]


Warfarin Sodium [Coumadin 5MG (*)] 5 mg PO SUMOWETHFR@16 05/12/17 [Last Taken 08 /07/17]


Furosemide [Lasix 40 MG (*)] 40 mg PO BID@09,14 05/20/17 [Last Taken 08/07/17 14

:00]


Warfarin Sodium [Coumadin 5MG (*)] 7.5 mg PO TUSA@16 05/20/17 [Last Taken 07/05/ 17]








Discharge Medications: Refer to the Discharge Home Medication list for PRN 

reason.





- Orders


Services needed: Home Care, Registered Nurse, Physical Therapy


Home Care Face to Face: I certify that this patient was under my care and that 

I had the required face-to-face encounter meeting the encounter requirements on 

the discharge day.  My findings support the fact that the patient is homebound 

as defined in CMS Chapter 7 Medicare Benefits Manual 30.1.1, The condition of 

the patient is such that there exists a normal inability to leave home and 

consequently, leaving home would require a considerable and taxing effort.





- Follow Up Care


Current Providers and Referrals: 


TIFFANY COVARRUBIAS [Other] - As per Instructions

## 2017-08-09 NOTE — PDIAF
- Diagnosis


Diagnosis: confusion


Code Status: Full Code





- Medication Management


Discharge Medications: 


 Medications to Continue on Transfer





Allopurinol [Allopurinol 300 MG (RX)] 150 mg PO DAILY 05/12/17 [Last Taken 08/07 /17]


Clopidogrel Bisulfate [Plavix (*)] 75 mg PO DAILY 05/12/17 [Last Taken 08/07/17]


Digoxin [Lanoxin 125 mcg (RX)] 125 mcg PO HS 05/12/17 [Last Taken 08/07/17]


Losartan Potassium [Cozaar 25 mg (*)] 12.5 mg PO DAILY 05/12/17 [Last Taken 08/ 07/17]


Metoprolol Succinate Xr [Toprol Xl 100 mg (*)] 100 mg PO DAILY 05/12/17 [Last 

Taken 08/07/17]


Nitroglycerin [Nitrostat 0.4 mg (*)] 0.4 mg SL AD PRN 05/12/17 [Last Taken 

Unknown]


Spironolactone [Aldactone 25 MG (*)] 12.5 mg PO DAILY 05/12/17 [Last Taken 08/07 /17]


Warfarin Sodium [Coumadin 5MG (*)] 5 mg PO SUMOWETHFR@16 05/12/17 [Last Taken 08 /07/17]


Furosemide [Lasix 40 MG (*)] 40 mg PO BID@09,14 05/20/17 [Last Taken 08/07/17 14

:00]


Warfarin Sodium [Coumadin 5MG (*)] 7.5 mg PO TUSA@16 05/20/17 [Last Taken 07/05/ 17]








Discharge Medications: Refer to the Discharge Home Medication list for PRN 

reason.





- Orders


Services needed: Home Care, Registered Nurse, Master , Physical 

Therapy


Home Care Face to Face: I certify that this patient was under my care and that 

I had the required face-to-face encounter meeting the encounter requirements on 

the discharge day.  My findings support the fact that the patient is homebound 

as defined in CMS Chapter 7 Medicare Benefits Manual 30.1.1, The condition of 

the patient is such that there exists a normal inability to leave home and 

consequently, leaving home would require a considerable and taxing effort.





- Follow Up Care


Current Providers and Referrals: 


TIFFANY COVARRUBIAS [Other] - As per Instructions

## 2017-08-20 ENCOUNTER — HOSPITAL ENCOUNTER (EMERGENCY)
Dept: HOSPITAL 80 - FED | Age: 77
Discharge: HOME | End: 2017-08-20
Payer: COMMERCIAL

## 2017-08-20 VITALS
OXYGEN SATURATION: 94 % | SYSTOLIC BLOOD PRESSURE: 105 MMHG | RESPIRATION RATE: 18 BRPM | DIASTOLIC BLOOD PRESSURE: 68 MMHG | HEART RATE: 88 BPM

## 2017-08-20 VITALS — OXYGEN SATURATION: 94 %

## 2017-08-20 VITALS — TEMPERATURE: 97.2 F

## 2017-08-20 VITALS — TEMPERATURE: 98.1 F | RESPIRATION RATE: 18 BRPM

## 2017-08-20 VITALS — SYSTOLIC BLOOD PRESSURE: 106 MMHG | HEART RATE: 86 BPM | DIASTOLIC BLOOD PRESSURE: 73 MMHG

## 2017-08-20 DIAGNOSIS — Z95.5: ICD-10-CM

## 2017-08-20 DIAGNOSIS — Z79.01: ICD-10-CM

## 2017-08-20 DIAGNOSIS — I10: ICD-10-CM

## 2017-08-20 DIAGNOSIS — I25.10: ICD-10-CM

## 2017-08-20 DIAGNOSIS — F41.9: ICD-10-CM

## 2017-08-20 DIAGNOSIS — R07.9: Primary | ICD-10-CM

## 2017-08-20 DIAGNOSIS — I25.810: ICD-10-CM

## 2017-08-20 DIAGNOSIS — R79.89: Primary | ICD-10-CM

## 2017-08-20 DIAGNOSIS — I12.9: ICD-10-CM

## 2017-08-20 DIAGNOSIS — I50.9: ICD-10-CM

## 2017-08-20 DIAGNOSIS — N18.1: ICD-10-CM

## 2017-08-20 LAB
% IMMATURE GRANULYOCYTES: 0.1 % (ref 0–1.1)
% IMMATURE GRANULYOCYTES: 0.3 % (ref 0–1.1)
ABSOLUTE IMMATURE GRANULOCYTES: 0.01 10^3/UL (ref 0–0.1)
ABSOLUTE IMMATURE GRANULOCYTES: 0.02 10^3/UL (ref 0–0.1)
ABSOLUTE NRBC COUNT: 0 10^3/UL (ref 0–0.01)
ABSOLUTE NRBC COUNT: 0 10^3/UL (ref 0–0.01)
ADD DIFF?: NO
ADD DIFF?: NO
ADD MORPH?: NO
ADD MORPH?: NO
ADD SCAN?: NO
ADD SCAN?: NO
ANION GAP SERPL CALC-SCNC: 13 MEQ/L (ref 8–16)
ANION GAP SERPL CALC-SCNC: 14 MEQ/L (ref 8–16)
APTT BLD: 40.4 SEC (ref 23–38)
ATYPICAL LYMPHOCYTE FLAG: 0 (ref 0–99)
ATYPICAL LYMPHOCYTE FLAG: 0 (ref 0–99)
CALCIUM SERPL-MCNC: 10 MG/DL (ref 8.5–10.4)
CALCIUM SERPL-MCNC: 9.7 MG/DL (ref 8.5–10.4)
CHLORIDE SERPL-SCNC: 105 MEQ/L (ref 97–110)
CHLORIDE SERPL-SCNC: 106 MEQ/L (ref 97–110)
CO2 SERPL-SCNC: 19 MEQ/L (ref 22–31)
CO2 SERPL-SCNC: 20 MEQ/L (ref 22–31)
CREAT SERPL-MCNC: 1.5 MG/DL (ref 0.7–1.3)
CREAT SERPL-MCNC: 1.6 MG/DL (ref 0.7–1.3)
ERYTHROCYTE [DISTWIDTH] IN BLOOD BY AUTOMATED COUNT: 15 % (ref 11.5–15.2)
ERYTHROCYTE [DISTWIDTH] IN BLOOD BY AUTOMATED COUNT: 15.2 % (ref 11.5–15.2)
FRAGMENT RBC FLAG: 0 (ref 0–99)
FRAGMENT RBC FLAG: 0 (ref 0–99)
GFR SERPL CREATININE-BSD FRML MDRD: 42 ML/MIN/{1.73_M2}
GFR SERPL CREATININE-BSD FRML MDRD: 45 ML/MIN/{1.73_M2}
GLUCOSE SERPL-MCNC: 105 MG/DL (ref 70–100)
GLUCOSE SERPL-MCNC: 130 MG/DL (ref 70–100)
HCT VFR BLD CALC: 39.7 % (ref 40–51)
HCT VFR BLD CALC: 43.8 % (ref 40–51)
HGB BLD-MCNC: 12.8 G/DL (ref 13.7–17.5)
HGB BLD-MCNC: 14.1 G/DL (ref 13.7–17.5)
INR PPP: 2.1 (ref 0.83–1.16)
LEFT SHIFT FLG: 0 (ref 0–99)
LEFT SHIFT FLG: 0 (ref 0–99)
LIPEMIA HEMOLYSIS FLAG: 80 (ref 0–99)
LIPEMIA HEMOLYSIS FLAG: 80 (ref 0–99)
MCH RBC BLDCO QN: 28.4 PG (ref 27.9–34.1)
MCH RBC BLDCO QN: 28.9 PG (ref 27.9–34.1)
MCHC RBC AUTO-ENTMCNC: 32.2 G/DL (ref 32.4–36.7)
MCHC RBC AUTO-ENTMCNC: 32.2 G/DL (ref 32.4–36.7)
MCV RBC AUTO: 88.2 FL (ref 81.5–99.8)
MCV RBC AUTO: 89.8 FL (ref 81.5–99.8)
NRBC-AUTO%: 0 % (ref 0–0.2)
NRBC-AUTO%: 0 % (ref 0–0.2)
PLATELET # BLD: 197 10^3/UL (ref 150–400)
PLATELET # BLD: 212 10^3/UL (ref 150–400)
PLATELET CLUMPS FLAG: 0 (ref 0–99)
PLATELET CLUMPS FLAG: 20 (ref 0–99)
PMV BLD AUTO: 9.6 FL (ref 8.7–11.7)
PMV BLD AUTO: 9.8 FL (ref 8.7–11.7)
POTASSIUM SERPL-SCNC: 4.5 MEQ/L (ref 3.5–5.2)
POTASSIUM SERPL-SCNC: 4.5 MEQ/L (ref 3.5–5.2)
PROTHROMBIN TIME: 23.7 SEC (ref 12–15)
RBC # BLD AUTO: 4.5 10^6/UL (ref 4.4–6.38)
RBC # BLD AUTO: 4.88 10^6/UL (ref 4.4–6.38)
SODIUM SERPL-SCNC: 138 MEQ/L (ref 134–144)
SODIUM SERPL-SCNC: 139 MEQ/L (ref 134–144)
TROPONIN I SERPL-MCNC: 0.09 NG/ML (ref 0–0.03)
TROPONIN I SERPL-MCNC: 0.1 NG/ML (ref 0–0.03)

## 2017-08-20 NOTE — EDPHY
HPI/HX/ROS/PE/MDM


Narrative: 





CHIEF COMPLAINT: Weakness





HPI: The patient is an anticoagulated 77 y/o male, with 20 prior ED visits in 

the last 12 months, complaining of weakness and fogginess while sitting in 

Hindu[employer] this morning. He has a medical history that includes atrial fibrillation

, CAD with LAD stent in 2016, CHF, hypertension, gout, and nephrolithiasis with 

recent stent placement. He was evaluated at [facility] ED yesterday for 

hematuria and discharged home in good condition with complete resolution of 

those symptoms. Last night he began to feel worried about being alone and 

called the [facility] for reassurance that he could sleep. While 

sitting in [employer] today, he began to feel diaphoretic, weak, and foggy. He 

developed some dizziness when he stood up to call 911. He denies associated 

chest pain or dyspnea at any point. EMS administered nitro and aspirin en route 

without change in symptoms. Upon assessment, he feels improved and thinks he 

could go home if his evaluation here is negative. He has been evaluated for 

similar symptoms frequently and understands he has anxiety related to his 

health.





REVIEW OF SYSTEMS:


Aside from elements discussed in the HPI, a comprehensive 10-point review of 

systems was reviewed and is negative.





PMH: Atrial Fibrillation - Eliquis, CHF, CAD, LAD Stent 2016, kidney stent, CABG





A past medical history was obtained through observation of past medical 

records. 





SOCIAL HISTORY: Patient lives alone, wife recently passed away due to cancer. 

Lives in [place]. 9 kids live out-of-state. Cardiologist: Dr. Farrell, Urologist

: Dr. Lainez





PHYSICAL EXAM:


General:Patient is alert, in no acute distress.


ENT:Eyes are normal to inspection.  ENT inspection normal.


Neck: Normal inspection.  Full range of motion.


Respiratory:No respiratory distress.  Breath sounds normal bilaterally.


Cardiovascular: Regular rate and rhythm.  Strong peripheral pulses.  Normal cap 

refill.


Abdomen:The abdomen is nontender to palpation. There are no peritoneal signs. 


Back: Normal to inspection.  No tenderness to palpation.


Skin: Normal color.  No rash.  Warm and dry.


Extremities: Normal appearance.  Full range of motion.


Neuro: Oriented x3.  Normal motor function.  Normal sensory function.





ED Course: 





This is a 77 y/o male well-known to the department who presents with weakness 

and anxiety about his health. He is nearly asymptomatic upon assessment. His 

exam is unremarkable. Plan for IV, labs, EKG, and chest x-ray. 





The 12 lead EKG was interpreted by myself. Atrial Fibrillation, RBBB and LPFB, 

rate 104. See hard copy and/or "tracemaster" electronic copy for interpretation.





Chest X-ray negative. Troponin levels elevated at 0.089. Discussed work up with 

the patient. He will be discharged home in good condition with referral to his 

PCP for follow up. Return precautions given. He is comfortable with this plan. 


MDM: 





This patient presents for recurrent episode of what sounds like a panic attack.

  He has been evaluated extensively in this as well as other EDs for the same 

symptoms and chronically has an elevated troponin.  There are no signs of acute 

ischemia at this time. I offered him admission for observation and further 

workup but he declines.  





- Data Points


Imaging: I viewed and interpreted images myself


Laboratory Results: 


 Laboratory Results





 08/20/17 10:50 





 08/20/17 10:35 





 











  08/20/17 08/20/17





  10:50 10:35


 


WBC  7.01 10^3/uL 10^3/uL  





   (3.80-9.50)  


 


RBC  4.50 10^6/uL 10^6/uL  





   (4.40-6.38)  


 


Hgb  12.8 g/dL L g/dL  





   (13.7-17.5)  


 


Hct  39.7 % L %  





   (40.0-51.0)  


 


MCV  88.2 fL fL  





   (81.5-99.8)  


 


MCH  28.4 pg pg  





   (27.9-34.1)  


 


MCHC  32.2 g/dL L g/dL  





   (32.4-36.7)  


 


RDW  15.0 % %  





   (11.5-15.2)  


 


Plt Count  197 10^3/uL 10^3/uL  





   (150-400)  


 


MPV  9.6 fL fL  





   (8.7-11.7)  


 


Neut % (Auto)  68.6 % %  





   (39.3-74.2)  


 


Lymph % (Auto)  23.3 % %  





   (15.0-45.0)  


 


Mono % (Auto)  5.8 % %  





   (4.5-13.0)  


 


Eos % (Auto)  1.3 % %  





   (0.6-7.6)  


 


Baso % (Auto)  0.9 % %  





   (0.3-1.7)  


 


Nucleat RBC Rel Count  0.0 % %  





   (0.0-0.2)  


 


Absolute Neuts (auto)  4.81 10^3/uL 10^3/uL  





   (1.70-6.50)  


 


Absolute Lymphs (auto)  1.63 10^3/uL 10^3/uL  





   (1.00-3.00)  


 


Absolute Monos (auto)  0.41 10^3/uL 10^3/uL  





   (0.30-0.80)  


 


Absolute Eos (auto)  0.09 10^3/uL 10^3/uL  





   (0.03-0.40)  


 


Absolute Basos (auto)  0.06 10^3/uL 10^3/uL  





   (0.02-0.10)  


 


Absolute Nucleated RBC  0.00 10^3/uL 10^3/uL  





   (0-0.01)  


 


Immature Gran %  0.1 % %  





   (0.0-1.1)  


 


Immature Gran #  0.01 10^3/uL 10^3/uL  





   (0.00-0.10)  


 


Sodium    138 mEq/L mEq/L





    (134-144) 


 


Potassium    4.5 mEq/L mEq/L





    (3.5-5.2) 


 


Chloride    106 mEq/L mEq/L





    () 


 


Carbon Dioxide    19 mEq/l L mEq/l





    (22-31) 


 


Anion Gap    13 mEq/L mEq/L





    (8-16) 


 


BUN    27 mg/dL H mg/dL





    (7-23) 


 


Creatinine    1.5 mg/dL H mg/dL





    (0.7-1.3) 


 


Estimated GFR    45 





   


 


Glucose    130 mg/dL H mg/dL





    () 


 


Calcium    9.7 mg/dL mg/dL





    (8.5-10.4) 


 


Troponin I    0.089 ng/mL H ng/mL





    (0.000-0.034) 











Medications Given: 


 








Discontinued Medications





Sodium Chloride (Ns)  500 mls @ 0 mls/hr IV EDNOW ONE; Wide Open


   PRN Reason: Protocol


   Stop: 08/20/17 10:42


   Last Admin: 08/20/17 11:06 Dose:  500 mls








General


Time Seen by Provider: 08/20/17 10:41


Initial Vital Signs: 


 Initial Vital Signs











Temperature (C)  36.7 C   08/20/17 10:45


 


Heart Rate  86   08/20/17 10:45


 


Respiratory Rate  18   08/20/17 10:45


 


Blood Pressure  115/75   08/20/17 10:45


 


O2 Sat (%)  96   08/20/17 10:45








 











O2 Delivery Mode               Room Air














Allergies/Adverse Reactions: 


 





atorvastatin calcium [From Lipitor] Allergy (Severe, Verified 08/20/17 18:41)


 Other-Enter Comments


haloperidol [From Haldol] Allergy (Verified 08/20/17 18:41)


 


simvastatin Allergy (Verified 08/20/17 18:41)


 








Home Medications: 














 Medication  Instructions  Recorded


 


Allopurinol [Allopurinol 300  mg PO DAILY 05/12/17





(RX)]  


 


Clopidogrel Bisulfate [Plavix (*)] 75 mg PO DAILY 05/12/17


 


Digoxin [Lanoxin 125 mcg (RX)] 125 mcg PO DAILY@19 05/12/17


 


Losartan Potassium [Cozaar 25 mg 12.5 mg PO DAILY 05/12/17





(*)]  


 


Metoprolol Succinate Xr [Toprol Xl 100 mg PO DAILY 05/12/17





100 mg (*)]  


 


Nitroglycerin [Nitrostat 0.4 mg 0.4 mg SL AD PRN 05/12/17





(*)]  


 


Spironolactone [Aldactone 25 MG 12.5 mg PO DAILY 05/12/17





(*)]  


 


Warfarin Sodium [Coumadin 5MG (*)] 5 mg PO MOWEFR@16 05/12/17


 


Furosemide [Lasix 40 MG (*)] 40 mg PO DAILY 05/20/17


 


Warfarin Sodium [Coumadin 5MG (*)] 7.5 mg PO SUTUTHSA@19 05/20/17


 


Furosemide [Lasix 20 MG (*)] 20 mg PO DAILY@19 08/20/17














Departure





- Departure


Disposition: Home, Routine, Self-Care


Clinical Impression: 


 Elevated troponin, Anxiety





Condition: Good


Instructions:  Anxiety (ED)


Additional Instructions: 


1. Follow up with your [place] physician for unimproved symptoms over the next 

few days.


2. Return to the emergency department for worsening of your condition. 


Referrals: 


Patient,NotPresent [Unknown] - As per Instructions


[facility] [Provider Group] - As per Instructions


Report Scribed for: Erik Hansen


Report Scribed by: Justine Cuellar


Date of Report: 08/20/17


Time of Report: 11:44


Physician Review and Approval Statement: Portions of this note were transcribed 

by an ED scribe.  I personally performed the history, physical exam, and 

medical decision making; and confirm the accuracy of the information in the 

transcribed note.

## 2017-08-20 NOTE — CPEKG
Heart Rate: 104

RR Interval: 577

QRSD Interval: 164

QT Interval: 424

QTC Interval: 558

QRS Axis: 106

T Wave Axis: -32

EKG Severity - ABNORMAL ECG -

EKG Impression: ATRIAL FIBRILLATION, V-RATE 

EKG Impression: RBBB AND LPFB

Electronically Signed By: Joe Maria 21-Aug-2017 15:55:30

## 2017-08-20 NOTE — GHP
[f rep st]



                                                            HISTORY AND PHYSICAL





DATE OF ADMISSION:  2017



HISTORY OF PRESENT ILLNESS:  This patient is a pleasant 76-year-old gentleman, with a history of isc
hemic cardiomyopathy, recurrent chest pain admissions, chronically elevated troponin; who presents w
ith chest pain.  



His life has been complex lately he was recently admitted with confusion after starting Ativan.  His
 wife  1 week ago after a long cancer death and he has a significant amount of anxiety.  The pat
ient does acknowledge that anxiety is driving his complaints of chest pain. 



He also was seen here this morning for hematuria, after recent ureteral stent placement.  He is also
 on Coumadin.  He returns today with chest pain.



REVIEW OF SYSTEMS:  Complete 10-point review of systems conducted negative except as noted in the HP
I.



PAST MEDICAL HISTORY:  

1.  Coronary disease LAD stent placement in 2016.

2.  CHF with an EF of 25%.

3.  Anxiety.

4.  Cognitive impairment.  

5.  Atrial fibrillation.

6.  Nephrolithiasis.

7.  Ureteral stent placement.

8.  History of right total knee replacement.  

9.  Appendectomy. 

10.  Gout. 

11.  History of left atrial thrombus.



SOCIAL HISTORY:  Wife recently .  Does not drink alcohol.  Nonsmoker.



FAMILY HISTORY:  The men in the family have had coronary disease.



ALLERGIES:  Atorvastatin, haloperidol and simvastatin.



MEDICATIONS:  Allopurinol, furosemide, digoxin, Plavix, metoprolol, losartan, nitroglycerin, warfari
n, spironolactone.



PHYSICAL EXAMINATION:  VITAL SIGNS:  Temp 35.9, blood pressure 123/86, pulse 93, breathing 22 times 
a minute, 96% on room air.  GENERAL:  No acute distress.  HEENT:  Sclerae are anicteric.  Oropharynx
 clear.  Mucous membranes moist.  NECK:  Supple, without lymphadenopathy or JVD.  LUNGS:  Clear to a
uscultation bilaterally.  HEART:  S1, S2.  ABDOMEN:  Soft, nontender, nondistended.  EXTREMITIES:  L
ower extremities without edema, calves are nontender.  SKIN:  Without rash.  NEUROLOGIC: Nonfocal.



DATA REVIEWED:  White count 7.3, hematocrit 43.8, platelets __________.  These were all consistent w
ith his baseline, drawn earlier today.  INR is 2.  Sodium 139, potassium 4.5, chloride 105, bicarb 2
0, BUN 28, creatinine 1.6.  Again, baseline troponin is 0.089 this morning that is his baseline, it 
is 0.101 today.  



He had an EKG interpreted by me:  Showing atrial fibrillation, right bundle branch block pattern.  T
his is unchanged from prior.  



He had a chest x-ray, which shows cardiomegaly, without failure.  Unchanged from prior.  I discussed
 the case Dr. Rubén Grimm.



ASSESSMENT/PLAN:  This is a 76-year-old gentleman presents chest pain:

1.  Chest pain.  The patient is not off his baseline.  He thinks this is driven by anxiety.  His tro
ponin is chronically elevated.  I would recommend not further work up.

2.  Anxiety.  The patient, when I am in there speaking with him, acknowledged that this is secondary
 to anxiety and he has a [facility] appointment for tomorrow.  He has elected to leave
 the hospital and I will therefore discharge him.  He will follow up with [facility] i
n the morning.  



Let this service as a discharge summary as well.





Job #:  [id number]

## 2017-08-20 NOTE — CPEKG
Heart Rate: 97

RR Interval: 619

QRSD Interval: 166

QT Interval: 448

QTC Interval: 569

QRS Axis: 127

T Wave Axis: -42

EKG Severity - ABNORMAL ECG -

EKG Impression: ATRIAL FIBRILLATION, V-RATE 

EKG Impression: VENTRICULAR PREMATURE COMPLEX

EKG Impression: RIGHT BUNDLE BRANCH BLOCK

EKG Impression: Similar to previous

Electronically Signed By: Rubén Grimm 20-Aug-2017 17:39:31

## 2017-08-20 NOTE — EDPHY
H & P


Time Seen by Provider: 17 18:23


HPI/ROS: 





CHIEF COMPLAINT:  Chest pain





HISTORY OF PRESENT ILLNESS:  Patient is a 76-year-old man with a history of 

chronic chest pain who comes to the emergency department by EMS complaining of 

chest pain. He was seen here early this morning with hematuria after recent 

ureteral stent placement for nephrolithiasis 2 days ago at [facility].  He 

does take Coumadin.  He also complained of chest pain at the time.  He had a 

slightly elevated troponin but at his baseline.  No significant EKG changes and 

was discharged home feeling well. He states that about 4 hours ago after he got 

home he began having chest pain and could not take a nap and decided to call 

for help.  He has a history of atrial fibrillation, coronary artery disease 

with a LAD stent placed in 2016, CHF with ejection fraction 25%, 

anxiety, cognitive impairment.  








REVIEW OF SYSTEMS:


Constitutional:  denies: chills, fever, recent illness, recent injury


EENTM: denies: blurred vision, double vision, nose congestion


Respiratory: denies: cough, shortness of breath


Cardiac: denies: chest pain, irregular heart rate, lightheadedness, palpitations


Gastrointestinal/Abdominal: denies: abdominal pain, diarrhea, nausea, vomiting, 

blood streaked stools


Genitourinary: denies: dysuria, frequency, hematuria, pain


Musculoskeletal: denies: joint pain, muscle pain


Skin: denies: lesions, rash, jaundice, bruising


Neurological: denies: headache, numbness, paresthesia, tingling, dizziness, 

weakness


Hematologic/Lymphatic: denies: blood clots, easy bleeding, easy bruising


Immunologic/allergic: denies: HIV/AIDS, transplant








EXAM:


GENERAL:  Well-appearing, well-nourished and in no acute distress.


HEAD:  Atraumatic, normocephalic.


EYES:  Pupils equal round and reactive to light, extraocular movements intact, 

sclera anicteric, conjunctiva are normal.


ENT:  TMs normal, nares patent, oropharynx clear without exudates.  Moist 

mucous membranes.


NECK:  Normal range of motion, supple without lymphadenopathy or JVD.


LUNGS:  Breath sounds clear to auscultation bilaterally and equal.  No wheezes 

rales or rhonchi.


HEART:  Regular rate and rhythm without murmurs, rubs or gallops.


ABDOMEN:  Soft, nontender, normoactive bowel sounds.  No guarding, no rebound.  

No masses appreciated.


BACK:  No CVA tenderness, no spinal tenderness, step-offs or deformities


EXTREMITIES:  Normal range of motion, no pitting or edema.  No clubbing or 

cyanosis.


NEUROLOGICAL:  Cranial nerves II through XII grossly intact.  Normal speech, 

normal gait.  5/5 strength, normal movement in all extremities, normal sensation


PSYCH:  Normal mood, normal affect.


SKIN:  Warm, dry, normal turgor, no visible rashes or lesions.








Source: Patient, EMS, Old records


Exam Limitations: No limitations





- Personal History


Tetanus Vaccine Date: 3/2010





- Medical/Surgical History


Hx Asthma: No


Hx Chronic Respiratory Disease: No


Hx Diabetes: No


Hx Cardiac Disease: Yes


Hx Renal Disease: Yes


Hx Cirrhosis: No


Hx Alcoholism: No


Hx HIV/AIDS: No


Hx Splenectomy or Spleen Trauma: No


Other PMH: 1. Chronic systolic heart failure ejection fraction of 25%,.  

hyperlipidemia.  2. Coronary artery disease status post stenting on 2016.

  3. Chronic atrial fibrillation.  4. Hypertension.  5. Chronic renal 

insufficiency.  6. Nephrolithiasis with ureteral stent placement 2016.  7. Gout.  8. Right total knee arthroplasty.  9. Appendectomy.  10. 

kidney stones





- Family History


Significant Family History: No pertinent family hx





- Social History


Smoking Status: Never smoked


Alcohol Use: Sober


Drug Use: None


Constitutional: 


 Initial Vital Signs











Temperature (C)  35.9 C L  17 18:41


 


Heart Rate  93   17 18:41


 


Respiratory Rate  22 H  17 18:41


 


Blood Pressure  123/86 H  17 18:41


 


O2 Sat (%)  96   17 18:41








 











O2 Delivery Mode               Room Air














Allergies/Adverse Reactions: 


 





atorvastatin calcium [From Lipitor] Allergy (Severe, Verified 17 18:41)


 Other-Enter Comments


haloperidol [From Haldol] Allergy (Verified 17 18:41)


 


simvastatin Allergy (Verified 17 18:41)


 








Home Medications: 














 Medication  Instructions  Recorded


 


Allopurinol [Allopurinol 300  mg PO DAILY 17





(RX)]  


 


Clopidogrel Bisulfate [Plavix (*)] 75 mg PO DAILY 17


 


Digoxin [Lanoxin 125 mcg (RX)] 125 mcg PO DAILY@19 17


 


Losartan Potassium [Cozaar 25 mg 12.5 mg PO DAILY 17





(*)]  


 


Metoprolol Succinate Xr [Toprol Xl 100 mg PO DAILY 17





100 mg (*)]  


 


Nitroglycerin [Nitrostat 0.4 mg 0.4 mg SL AD PRN 17





(*)]  


 


Spironolactone [Aldactone 25 MG 12.5 mg PO DAILY 17





(*)]  


 


Warfarin Sodium [Coumadin 5MG (*)] 5 mg PO MOWEFR@16 17


 


Furosemide [Lasix 40 MG (*)] 40 mg PO DAILY 17


 


Warfarin Sodium [Coumadin 5MG (*)] 7.5 mg PO SUTUTHSA@17


 


Furosemide [Lasix 20 MG (*)] 20 mg PO DAILY@17














Medical Decision Making





- Diagnostics


EKG Interpretation: 





An EKG obtained and was read and documented in trace view.  Please see trace 

view for full reading and report.  Atrial fibrillation with right bundle branch 

block and PVC similar to previous, no acute ischemic changes 


ED Course/Re-evaluation: 





7:30 p.m. I discussed the case with Dr. Juventino Hernandez who will admit to the 

medical service.  The patient's troponin is slightly elevated compared to 

earlier today however it is still within his baseline range.  We discussed his 

disposition.  He is scared to go home because his wife  last week and he 

has no one else there to care for him.  He does have a history of significant 

cardiomyopathy.





815 after speaking with Dr. Hernandez the patient now wishes to go home.  Dr. Hernandez will prepare discharge paperwork.


Differential Diagnosis: 





Partial list of the Differential diagnosis considered include but were not 

limited to;  acute coronary disease, CHF, ischemia, arrhythmia, anxiety and 

although unlikely based on the history and physical exam, I also considered 

dissection, aneurysm, hemorrhage. 





- Data Points


Laboratory Results: 


 Laboratory Results





 17 Unknown 





 17 Unknown 








Medications Given: 


 








Discontinued Medications





Ondansetron HCl (Zofran Odt)  4 mg PO EDNOW ONE


   Stop: 17 19:19


   Last Admin: 17 19:23 Dose:  4 mg








Departure





- Departure


Disposition: Home, Routine, Self-Care


Clinical Impression: 


 Anxiety





Chest pain


Qualifiers:


 Chest pain type: unspecified Qualified Code(s): R07.9 - Chest pain, unspecified





Condition: Fair


Instructions:  Chest Pain (ED)


Referrals: 


Patient,NotPresent [Unknown] - As per Instructions

## 2017-08-25 ENCOUNTER — HOSPITAL ENCOUNTER (EMERGENCY)
Dept: HOSPITAL 80 - FED | Age: 77
Discharge: HOME | End: 2017-08-25
Payer: COMMERCIAL

## 2017-08-25 VITALS
TEMPERATURE: 98.1 F | OXYGEN SATURATION: 92 % | HEART RATE: 83 BPM | SYSTOLIC BLOOD PRESSURE: 135 MMHG | DIASTOLIC BLOOD PRESSURE: 87 MMHG

## 2017-08-25 VITALS — RESPIRATION RATE: 16 BRPM

## 2017-08-25 DIAGNOSIS — I50.9: ICD-10-CM

## 2017-08-25 DIAGNOSIS — F41.9: Primary | ICD-10-CM

## 2017-08-25 DIAGNOSIS — Z79.01: ICD-10-CM

## 2017-08-25 DIAGNOSIS — I25.10: ICD-10-CM

## 2017-08-25 DIAGNOSIS — T42.4X5A: ICD-10-CM

## 2017-08-25 LAB
% IMMATURE GRANULYOCYTES: 0.4 % (ref 0–1.1)
ABSOLUTE IMMATURE GRANULOCYTES: 0.02 10^3/UL (ref 0–0.1)
ABSOLUTE NRBC COUNT: 0 10^3/UL (ref 0–0.01)
ADD DIFF?: NO
ADD MORPH?: NO
ADD SCAN?: NO
ANION GAP SERPL CALC-SCNC: 14 MEQ/L (ref 8–16)
ATYPICAL LYMPHOCYTE FLAG: 10 (ref 0–99)
CALCIUM SERPL-MCNC: 9.5 MG/DL (ref 8.5–10.4)
CHLORIDE SERPL-SCNC: 105 MEQ/L (ref 97–110)
CO2 SERPL-SCNC: 22 MEQ/L (ref 22–31)
CREAT SERPL-MCNC: 1.4 MG/DL (ref 0.7–1.3)
ERYTHROCYTE [DISTWIDTH] IN BLOOD BY AUTOMATED COUNT: 15.7 % (ref 11.5–15.2)
FRAGMENT RBC FLAG: 0 (ref 0–99)
GFR SERPL CREATININE-BSD FRML MDRD: 49 ML/MIN/{1.73_M2}
GLUCOSE SERPL-MCNC: 111 MG/DL (ref 70–100)
HCT VFR BLD CALC: 36 % (ref 40–51)
HGB BLD-MCNC: 11.3 G/DL (ref 13.7–17.5)
LEFT SHIFT FLG: 0 (ref 0–99)
LIPEMIA HEMOLYSIS FLAG: 80 (ref 0–99)
MCH RBC BLDCO QN: 28.8 PG (ref 27.9–34.1)
MCHC RBC AUTO-ENTMCNC: 31.4 G/DL (ref 32.4–36.7)
MCV RBC AUTO: 91.8 FL (ref 81.5–99.8)
NRBC-AUTO%: 0 % (ref 0–0.2)
PLATELET # BLD: 155 10^3/UL (ref 150–400)
PLATELET CLUMPS FLAG: 30 (ref 0–99)
PMV BLD AUTO: 9.5 FL (ref 8.7–11.7)
POTASSIUM SERPL-SCNC: 5 MEQ/L (ref 3.5–5.2)
RBC # BLD AUTO: 3.92 10^6/UL (ref 4.4–6.38)
SODIUM SERPL-SCNC: 141 MEQ/L (ref 134–144)
TROPONIN I SERPL-MCNC: 0.09 NG/ML (ref 0–0.03)

## 2017-08-25 NOTE — CPEKG
Heart Rate: 97

RR Interval: 619

QRSD Interval: 170

QT Interval: 412

QTC Interval: 524

QRS Axis: 127

T Wave Axis: -31

EKG Severity - ABNORMAL ECG -

EKG Impression: ATRIAL FIBRILLATION, V-RATE 



EKG Impression: RIGHT BUNDLE BRANCH BLOCK

Electronically Signed By: Merlene Fan 25-Aug-2017 08:43:32

## 2017-08-25 NOTE — EDPHY
HPI/HX/ROS/PE/MDM


Narrative: 





CHIEF COMPLAINT: Medication side effect





HISTORY OF PRESENT ILLNESS: The patient is a 77 y/o male with 22 previous 

visits in the last year arriving via EMS to the ED for evaluation of a 

medication side effect. He has a past medical history significant for cardiac 

disease and health anxiety, and has received extensive workup for his prior 

chest pain. He recently went to [facility] and was prescribed 

Ativan for his anxiety. He was watching a news talk show last night, began to 

feel upset about the news, and then developed shortness of breath. He then took 

1 pill of Ativan which resolved his shortness of breath completely and he was 

able to sleep without issue. At woke at 05:30 to "a nurse shaking me and 

telling me I was going to the hospital". After he got dressed he realized that 

he was at home and that there was no nurse. He thinks this hallucination was 

caused by Ativan so he called 911. He currently denies complaints and says, "I 

just want to go home, there is nothing wrong with me and I don't want to be 

back in the hospital."


Patient denies any chest pain.  He denies any residual shortness of breath.  He 

denies any nausea or vomiting.  He states that he does not want to be admitted 

to the hospital again and he wants to go home.  He has close follow-up at 

[place].


No fever, chills, chest pain, palpitations, vomiting, diarrhea, urinary 

complaints, headache, lightheadedness.





REVIEW OF SYSTEMS:


Aside from elements discussed in the HPI, a comprehensive 10-point review of 

systems was reviewed and is negative.





PAST MEDICAL HISTORY: Afib, CAD (with LAD stent . Ischemic 

Cardiomyopathy.), CHF.





Prior medical records reviewed including admission on 17 for confusion.





SOCIAL HISTORY: Wife  in the past year, lives home alone, children are out 

of state. 





VITAL SIGNS: Reviewed by me


GENERAL: Elderly, well-nourished, sitting on edge of bed with IV pulled out, in 

no respiratory distress.


HEENT: Atraumatic. Eyes: No icterus, no injection. Mouth: moist mucous 

membranes.  No erythema or lesions. Neck: supple with no adenopathy.


LUNGS: Clear to auscultation bilaterally, no wheezes, rhonchi or rales.


CARDIAC: Slightly irregular rate and rhythm, no rubs, murmurs or gallops. 


ABDOMEN: Soft, nontender, no guarding or rebound.


EXTREMITIES: No trauma. No edema.  Range of motion is normal throughout.


NEURO: Alert and oriented,  grossly nonfocal.  


SKIN: Warm and dry, no rash.


PSYCHIATRIC: Normal mentation, no agitation.





Portions of this note were transcribed by a medical scribe.  I personally 

performed a history, physical exam, medical decision making, and confirmed 

accuracy of information the transcribed note.








ED Course: 





77 y/o male presents asymptomatic after hallucination following Ativan use. 

Physical exam is unremarkable aside from slightly irregular heart rate. IV 

established, basic labs drawn including troponin, patient placed on cardiac 

monitor. 





The 12 lead EKG was interpreted by myself. Afib rate 97 and RBBB.  See hard 

copy and/or "tracemaster" electronic copy for interpretation.





0847:  Patient's course discussed with the [facility].  They will make note of 

his visit to the emergency department.  I asked them to ensure that [facility] contact the patient later today to see how he is doing.  They 

said that they will be able to follow him closely


.


I recommended patient try taking half of his Ativan dose for recurrent anxiety 

related symptoms and to follow up with his [facility] Physician for 

any concerns regarding medication. He understands the instructions and agrees 

to this plan. Return precautions discussed.











MDM: 





Differential diagnosis for the patient's shortness of breath was considered 

including but not limited to anxiety, congestive heart failure, pulmonary edema

, cardiac causes, pneumonia.





- Data Points


Laboratory Results: 


 Laboratory Results





 17 06:30 





 17 06:30 





 











  17





  06:30 06:30


 


WBC    5.52 10^3/uL 10^3/uL





    (3.80-9.50) 


 


RBC    3.92 10^6/uL L 10^6/uL





    (4.40-6.38) 


 


Hgb    11.3 g/dL L g/dL





    (13.7-17.5) 


 


Hct    36.0 % L %





    (40.0-51.0) 


 


MCV    91.8 fL fL





    (81.5-99.8) 


 


MCH    28.8 pg pg





    (27.9-34.1) 


 


MCHC    31.4 g/dL L g/dL





    (32.4-36.7) 


 


RDW    15.7 % H %





    (11.5-15.2) 


 


Plt Count    155 10^3/uL 10^3/uL





    (150-400) 


 


MPV    9.5 fL fL





    (8.7-11.7) 


 


Neut % (Auto)    58.3 % %





    (39.3-74.2) 


 


Lymph % (Auto)    30.6 % %





    (15.0-45.0) 


 


Mono % (Auto)    7.4 % %





    (4.5-13.0) 


 


Eos % (Auto)    2.0 % %





    (0.6-7.6) 


 


Baso % (Auto)    1.3 % %





    (0.3-1.7) 


 


Nucleat RBC Rel Count    0.0 % %





    (0.0-0.2) 


 


Absolute Neuts (auto)    3.22 10^3/uL 10^3/uL





    (1.70-6.50) 


 


Absolute Lymphs (auto)    1.69 10^3/uL 10^3/uL





    (1.00-3.00) 


 


Absolute Monos (auto)    0.41 10^3/uL 10^3/uL





    (0.30-0.80) 


 


Absolute Eos (auto)    0.11 10^3/uL 10^3/uL





    (0.03-0.40) 


 


Absolute Basos (auto)    0.07 10^3/uL 10^3/uL





    (0.02-0.10) 


 


Absolute Nucleated RBC    0.00 10^3/uL 10^3/uL





    (0-0.01) 


 


Immature Gran %    0.4 % %





    (0.0-1.1) 


 


Immature Gran #    0.02 10^3/uL 10^3/uL





    (0.00-0.10) 


 


Sodium  141 mEq/L mEq/L  





   (134-144)  


 


Potassium  5.0 mEq/L mEq/L  





   (3.5-5.2)  


 


Chloride  105 mEq/L mEq/L  





   ()  


 


Carbon Dioxide  22 mEq/l mEq/l  





   (22-31)  


 


Anion Gap  14 mEq/L mEq/L  





   (8-16)  


 


BUN  31 mg/dL H mg/dL  





   (7-23)  


 


Creatinine  1.4 mg/dL H mg/dL  





   (0.7-1.3)  


 


Estimated GFR  49   





   


 


Glucose  111 mg/dL H mg/dL  





   ()  


 


Calcium  9.5 mg/dL mg/dL  





   (8.5-10.4)  


 


Troponin I  0.090 ng/mL H ng/mL  





   (0.000-0.034)  














General


Time Seen by Provider: 17 08:04


Initial Vital Signs: 


 Initial Vital Signs











Temperature (C)  36.5 C   17 06:38


 


Heart Rate  81   17 06:38


 


Respiratory Rate  16   17 06:38


 


Blood Pressure  123/89 H  17 06:38


 


O2 Sat (%)  93   17 06:38








 











O2 Delivery Mode               Room Air














Allergies/Adverse Reactions: 


 





atorvastatin calcium [From Lipitor] Allergy (Severe, Verified 17 18:41)


 Other-Enter Comments


haloperidol [From Haldol] Allergy (Verified 17 18:41)


 


simvastatin Allergy (Verified 17 18:41)


 








Home Medications: 














 Medication  Instructions  Recorded


 


Allopurinol [Allopurinol 300  mg PO DAILY 17





(RX)]  


 


Clopidogrel Bisulfate [Plavix (*)] 75 mg PO DAILY 17


 


Digoxin [Lanoxin 125 mcg (RX)] 125 mcg PO DAILY@17


 


Losartan Potassium [Cozaar 25 mg 12.5 mg PO DAILY 17





(*)]  


 


Metoprolol Succinate Xr [Toprol Xl 100 mg PO DAILY 17





100 mg (*)]  


 


Nitroglycerin [Nitrostat 0.4 mg 0.4 mg SL AD PRN 17





(*)]  


 


Spironolactone [Aldactone 25 MG 12.5 mg PO DAILY 17





(*)]  


 


Warfarin Sodium [Coumadin 5MG (*)] 5 mg PO MOWEFR@17


 


Furosemide [Lasix 40 MG (*)] 40 mg PO DAILY 17


 


Warfarin Sodium [Coumadin 5MG (*)] 7.5 mg PO SUTUTHSA@17


 


Furosemide [Lasix 20 MG (*)] 20 mg PO DAILY@17














Departure





- Departure


Disposition: Home, Routine, Self-Care


Clinical Impression: 


 Anxiety





Medication side effect


Qualifiers:


 Encounter type: initial encounter Qualified Code(s): T88.7XXA - Unspecified 

adverse effect of drug or medicament, initial encounter





Condition: Good


Instructions:  Lorazepam (By mouth)


Additional Instructions: 


1. Contact [facility] today to discuss your Ativan use for your 

symptoms.


2. If you have shortness of breath again tonight, you can start with a half 

pill of you Ativan. This will likely help reduce any side effects. 


3. Return to the ER for worsening of condition. 


Referrals: 


CHAU COVARRUBIAS [Other] - As per Instructions

## 2017-08-28 ENCOUNTER — HOSPITAL ENCOUNTER (OUTPATIENT)
Dept: HOSPITAL 80 - FED | Age: 77
Setting detail: OBSERVATION
Discharge: LEFT BEFORE DISCHARGE | End: 2017-08-28
Attending: HOSPITALIST | Admitting: HOSPITALIST
Payer: COMMERCIAL

## 2017-08-28 VITALS
DIASTOLIC BLOOD PRESSURE: 78 MMHG | HEART RATE: 87 BPM | TEMPERATURE: 97.3 F | OXYGEN SATURATION: 90 % | SYSTOLIC BLOOD PRESSURE: 99 MMHG | RESPIRATION RATE: 22 BRPM

## 2017-08-28 DIAGNOSIS — R55: Primary | ICD-10-CM

## 2017-08-28 DIAGNOSIS — I42.9: ICD-10-CM

## 2017-08-28 DIAGNOSIS — S09.90XA: ICD-10-CM
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
## 2017-08-28 DIAGNOSIS — M10.9: ICD-10-CM

## 2017-08-28 DIAGNOSIS — I48.91: ICD-10-CM

## 2017-08-28 DIAGNOSIS — Z79.01: ICD-10-CM
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
## 2017-08-28 DIAGNOSIS — Z96.651: ICD-10-CM

## 2017-08-28 DIAGNOSIS — E78.5: ICD-10-CM

## 2017-08-28 DIAGNOSIS — I50.22: ICD-10-CM

## 2017-08-28 DIAGNOSIS — Z95.1: ICD-10-CM

## 2017-08-28 DIAGNOSIS — I25.10: ICD-10-CM

## 2017-08-28 DIAGNOSIS — I11.0: ICD-10-CM

## 2017-08-28 DIAGNOSIS — Z95.5: ICD-10-CM

## 2017-08-28 LAB
% IMMATURE GRANULYOCYTES: 0.3 % (ref 0–1.1)
ABSOLUTE IMMATURE GRANULOCYTES: 0.02 10^3/UL (ref 0–0.1)
ABSOLUTE NRBC COUNT: 0 10^3/UL (ref 0–0.01)
ADD DIFF?: NO
ADD MORPH?: NO
ADD SCAN?: NO
ANION GAP SERPL CALC-SCNC: 13 MEQ/L (ref 8–16)
ATYPICAL LYMPHOCYTE FLAG: 0 (ref 0–99)
CALCIUM SERPL-MCNC: 9.4 MG/DL (ref 8.5–10.4)
CHLORIDE SERPL-SCNC: 105 MEQ/L (ref 97–110)
CO2 SERPL-SCNC: 20 MEQ/L (ref 22–31)
CREAT SERPL-MCNC: 1.7 MG/DL (ref 0.7–1.3)
ERYTHROCYTE [DISTWIDTH] IN BLOOD BY AUTOMATED COUNT: 15.7 % (ref 11.5–15.2)
ETHANOL SERPL-MCNC: < 10 MG/DL (ref 0–10)
FRAGMENT RBC FLAG: 0 (ref 0–99)
GFR SERPL CREATININE-BSD FRML MDRD: 39 ML/MIN/{1.73_M2}
GLUCOSE SERPL-MCNC: 95 MG/DL (ref 70–100)
HCT VFR BLD CALC: 42.1 % (ref 40–51)
HGB BLD-MCNC: 13.5 G/DL (ref 13.7–17.5)
INR PPP: 2.25 (ref 0.83–1.16)
LEFT SHIFT FLG: 0 (ref 0–99)
LIPEMIA HEMOLYSIS FLAG: 80 (ref 0–99)
MCH RBC BLDCO QN: 28.4 PG (ref 27.9–34.1)
MCHC RBC AUTO-ENTMCNC: 32.1 G/DL (ref 32.4–36.7)
MCV RBC AUTO: 88.4 FL (ref 81.5–99.8)
NRBC-AUTO%: 0 % (ref 0–0.2)
PLATELET # BLD: 188 10^3/UL (ref 150–400)
PLATELET CLUMPS FLAG: 20 (ref 0–99)
PMV BLD AUTO: 10.1 FL (ref 8.7–11.7)
POTASSIUM SERPL-SCNC: 4.2 MEQ/L (ref 3.5–5.2)
PROTHROMBIN TIME: 25.1 SEC (ref 12–15)
RBC # BLD AUTO: 4.76 10^6/UL (ref 4.4–6.38)
SODIUM SERPL-SCNC: 138 MEQ/L (ref 134–144)
TROPONIN I SERPL-MCNC: 0.07 NG/ML (ref 0–0.03)

## 2017-08-28 PROCEDURE — G0378 HOSPITAL OBSERVATION PER HR: HCPCS

## 2017-08-28 PROCEDURE — A9585 GADOBUTROL INJECTION: HCPCS

## 2017-08-28 PROCEDURE — G0480 DRUG TEST DEF 1-7 CLASSES: HCPCS

## 2017-08-28 PROCEDURE — 93005 ELECTROCARDIOGRAM TRACING: CPT

## 2017-08-28 PROCEDURE — 71020: CPT

## 2017-08-28 PROCEDURE — 70544 MR ANGIOGRAPHY HEAD W/O DYE: CPT

## 2017-08-28 PROCEDURE — 70450 CT HEAD/BRAIN W/O DYE: CPT

## 2017-08-28 PROCEDURE — 70551 MRI BRAIN STEM W/O DYE: CPT

## 2017-08-28 NOTE — PDDCSUM
Discharge Summary


Discharge Summary: 


Patient informed nurse around 9:00 p.m. that he like to be discharged from the 

hospital, and given that ongoing workup is still in process, do not recommend 

the patient leave prematurely at this time.  That being said, I have received 

communication from Dr. Paulino Garg, radiology, the patient's MRI does not appear 

to have acute CVA.  The patient has elected to be discharged from the hospital 

against medical advice and he assumes all responsibility for any injury which 

may occur secondary to any subsequent falls.  He has yet to be evaluated by 

physical or occupational therapy but the patient assumes these risks as he 

decides to leave.

## 2017-08-28 NOTE — PDCONSULT
Consultant Note: 


[facility] hospice, patient is enrolled with palliative services, they were able to 

corroborate the patient contacted them this morning and he reported that he was 

feeling short of breath, did not mention any lightheadedness, and, per the 

triage nurse at [facility], the patient did not sound short of breath on the 

telephone.  They asked him if he felt anxious and when questioned, the patient 

reported that he would not take any Ativan.  At this point, the triage nurse 

reports the patient hung up and ended the call.





I requested that [facility] hospice care coordinator Rosa Isela Mcgrath collaborate with 

case management tomorrow in order to provide safest discharge possible for this 

patient.  Review of his records now that our EMR is back on line demonstrates 

that this is the 3rd presentation in 8 days, with the 1st 2 presentations being 

related to healthcare anxiety and no true pathologic process.  The patient has 

chronically elevated troponin level secondary to his nonischemic cardiomyopathy

, and is currently at his baseline.  His last echocardiogram was in May of 2017 

demonstrating ejection fraction of 20% with severe mitral regurgitation.  Will 

not repeat echocardiogram at this time.

## 2017-08-28 NOTE — EDPHY
H & P


Time Seen by Provider: 08/28/17 14:17


HPI/ROS: 





CHIEF COMPLAINT:  Syncope, head injury





HISTORY OF PRESENT ILLNESS:  76-year-old male with medical history significant 

for atrial fibrillation, coronary artery disease, CHF, hypertension, gout, 

nephrolithiasis, anticoagulated , arrives via ambulance after he had an 

unwitnessed syncopal episode at home, states that he wound stood up from a sofa

, had a syncopal episode hitting his head against the wall.  This occurred 

approximately 2 hours ago.  States has been complaining of headache ever since 

and is concerned and therefore called 911.





Regarding his syncopal episode states that he denies antecedent symptoms such 

as chest pain, dyspnea, visual disturbance  





PRIMARY CARE PROVIDER:[place]  





REVIEW OF SYSTEMS:


A ten point review of systems was performed and is negative with the exception 

of the items mentioned in the HPI








PAST MEDICAL/SURGICAL HISTORY:  Positive for anticoagulant use with Eliquis.  

Atrial fibrillation.  Student digit.  Coronary artery disease.  Cardiac 

stenting.  





SOCIAL HISTORY: denies alcohol use at time of incident





*********





PHYSICAL EXAM 





1) GENERAL: Well-developed, well-nourished, alert and oriented.  Appears to be 

in no acute distress. Answering questions appropriately.


2) HEAD: Normocephalic, frontal abrasion and hematoma


3) HEENT: Pupils equal, round, reactive to light bilaterally. Negative Horners. 

Nasopharynx, oropharynx, clear.   No deformity or angulation of nose.  No 

septal hematoma.  No rhinorrhea. No oral trauma. Ears bilaterally with normal 

tympanic membranes.  No hemotympanum.  No fluid or blood in the external 

auditory canal.  No raccoon eyes.  No Cruz sign.  Teeth are normally aligned 

with no gross malocclusion, TMJ bilaterally nontender, facial bones nontender 

including the zygomatic arch, maxilla mandible.


4) NECK:  No cervical collar is on. Posterior cervical spine is nontender, no 

stepoff, no effusion. Full range of motion which does not elicit any midline 

cervical spine pain, no posterior midline tenderness, no step-off.


5) LUNGS: Clear to auscultation bilaterally, no wheezes, no rhonchi, no 

retractions.  No obvious signs of trauma.  No chest wall pain.  No flaring, no 

grunting.  Moving symmetrically.  No crepitus. 


6) HEART: Regular rate and rhythm, 


7) ABDOMEN: No guarding, no rebound, no focal tenderness, no peritoneal signs, 

no signs of trauma, no ecchymosis


8) MUSCULOSKELETAL: Moving all extremities, no focal areas of tenderness, no 

obvious trauma.


9) BACK: No midline vertebral tenderness, no fluctuance, no step-off, no 

obvious trauma, no visual or palpable abnormality.


10) SKIN:   No laceration.  No abrasion





***********





DIFFERENTIAL DIAGNOSIS: in no particular order including but not limited to 

cardiac arrhythmia, intracranial hemorrhage, skull fracture, MI, PE 





- Personal History


Tetanus Vaccine Date: 3/2010





- Medical/Surgical History


Hx Asthma: No


Hx Chronic Respiratory Disease: No


Hx Diabetes: No


Hx Cardiac Disease: Yes


Hx Renal Disease: Yes


Hx Cirrhosis: No


Hx Alcoholism: No


Hx HIV/AIDS: No


Hx Splenectomy or Spleen Trauma: No


Other PMH: 1. Chronic systolic heart failure ejection fraction of 25%,.  

hyperlipidemia.  2. Coronary artery disease status post stenting on 11/16/2016.

  3. Chronic atrial fibrillation.  4. Hypertension.  5. Chronic renal 

insufficiency.  6. Nephrolithiasis with ureteral stent placement November of 2016.  7. Gout.  8. Right total knee arthroplasty.  9. Appendectomy.  10. 

kidney stones





- Social History


Smoking Status: Never smoked


Constitutional: 


 Initial Vital Signs











Temperature (C)  36.5 C   08/28/17 14:21


 


Heart Rate  85   08/28/17 14:21


 


Respiratory Rate  16   08/28/17 14:21


 


Blood Pressure  104/62   08/28/17 14:21


 


O2 Sat (%)  91 L  08/28/17 14:21








 











O2 Delivery Mode               Room Air














Allergies/Adverse Reactions: 


 





atorvastatin calcium [From Lipitor] Allergy (Severe, Verified 08/20/17 18:41)


 Other-Enter Comments


haloperidol [From Haldol] Allergy (Verified 08/20/17 18:41)


 


simvastatin Allergy (Verified 08/20/17 18:41)


 








Home Medications: 














 Medication  Instructions  Recorded


 


Allopurinol [Allopurinol 300  mg PO DAILY 05/12/17





(RX)]  


 


Clopidogrel Bisulfate [Plavix (*)] 75 mg PO DAILY 05/12/17


 


Digoxin [Lanoxin 125 mcg (RX)] 125 mcg PO DAILY@19 05/12/17


 


Losartan Potassium [Cozaar 25 mg 12.5 mg PO DAILY 05/12/17





(*)]  


 


Metoprolol Succinate Xr [Toprol Xl 100 mg PO DAILY 05/12/17





100 mg (*)]  


 


Nitroglycerin [Nitrostat 0.4 mg 0.4 mg SL AD PRN 05/12/17





(*)]  


 


Spironolactone [Aldactone 25 MG 12.5 mg PO DAILY 05/12/17





(*)]  


 


Warfarin Sodium [Coumadin 5MG (*)] 5 mg PO MOWEFR@16 05/12/17


 


Furosemide [Lasix 40 MG (*)] 40 mg PO DAILY 05/20/17


 


Warfarin Sodium [Coumadin 5MG (*)] 7.5 mg PO SUTUTHSA@19 05/20/17


 


Furosemide [Lasix 20 MG (*)] 20 mg PO DAILY@19 08/20/17














Medical Decision Making


ED Course/Re-evaluation: 





2:29 pm:  Discussed case with secondary supervising physician Dr. Abdias Herndon 

in the ER.  Old medical records reviewed.  Plan will be diagnostic studies 

including CT imaging and likely transfer to [place]. Head CT ordered in this 

patient for trauma for the following indication:  greater than 65 years old, 

anticoagulated.





3:20 p.m.:  Consultation with [place] for the patient's syncope and patient is 

self-described concerns over his ability to care for himself.  They request 

patient be admitted to [facility]





3:25 p.m.:  Consultation with Dr. Perez who will admit patient.  





- Data Points


Laboratory Results: 


 Laboratory Results





 08/28/17 14:19 





 08/28/17 14:19 





 











  08/28/17 08/28/17





  14:19 14:19


 


WBC    7.82 10^3/uL 10^3/uL





    (3.80-9.50) 


 


RBC    4.76 10^6/uL 10^6/uL





    (4.40-6.38) 


 


Hgb    13.5 g/dL L g/dL





    (13.7-17.5) 


 


Hct    42.1 % %





    (40.0-51.0) 


 


MCV    88.4 fL fL





    (81.5-99.8) 


 


MCH    28.4 pg pg





    (27.9-34.1) 


 


MCHC    32.1 g/dL L g/dL





    (32.4-36.7) 


 


RDW    15.7 % H %





    (11.5-15.2) 


 


Plt Count    188 10^3/uL 10^3/uL





    (150-400) 


 


MPV    10.1 fL fL





    (8.7-11.7) 


 


Neut % (Auto)    69.5 % %





    (39.3-74.2) 


 


Lymph % (Auto)    21.4 % %





    (15.0-45.0) 


 


Mono % (Auto)    7.0 % %





    (4.5-13.0) 


 


Eos % (Auto)    1.0 % %





    (0.6-7.6) 


 


Baso % (Auto)    0.8 % %





    (0.3-1.7) 


 


Nucleat RBC Rel Count    0.0 % %





    (0.0-0.2) 


 


Absolute Neuts (auto)    5.44 10^3/uL 10^3/uL





    (1.70-6.50) 


 


Absolute Lymphs (auto)    1.67 10^3/uL 10^3/uL





    (1.00-3.00) 


 


Absolute Monos (auto)    0.55 10^3/uL 10^3/uL





    (0.30-0.80) 


 


Absolute Eos (auto)    0.08 10^3/uL 10^3/uL





    (0.03-0.40) 


 


Absolute Basos (auto)    0.06 10^3/uL 10^3/uL





    (0.02-0.10) 


 


Absolute Nucleated RBC    0.00 10^3/uL 10^3/uL





    (0-0.01) 


 


Immature Gran %    0.3 % %





    (0.0-1.1) 


 


Immature Gran #    0.02 10^3/uL 10^3/uL





    (0.00-0.10) 


 


Sodium  138 mEq/L mEq/L  





   (134-144)  


 


Potassium  4.2 mEq/L mEq/L  





   (3.5-5.2)  


 


Chloride  105 mEq/L mEq/L  





   ()  


 


Carbon Dioxide  20 mEq/l L mEq/l  





   (22-31)  


 


Anion Gap  13 mEq/L mEq/L  





   (8-16)  


 


BUN  38 mg/dL H mg/dL  





   (7-23)  


 


Creatinine  1.7 mg/dL H mg/dL  





   (0.7-1.3)  


 


Estimated GFR  39   





   


 


Glucose  95 mg/dL mg/dL  





   ()  


 


Calcium  9.4 mg/dL mg/dL  





   (8.5-10.4)  


 


Creatine Kinase  61 IU/L IU/L  





   (0-224)  


 


Troponin I  0.067 ng/mL H ng/mL  





   (0.000-0.034)  


 


Ethyl Alcohol  < 10 mg/dL mg/dL  





   (0-10)  














Departure





- Departure


Disposition: [facility] Inpatient Acute


Clinical Impression: 


 Acute prerenal azotemia





Syncope


Qualifiers:


 Syncope type: unspecified Qualified Code(s): R55 - Syncope and collapse





Head injury


Qualifiers:


 Encounter type: initial encounter Qualified Code(s): S09.90XA - Unspecified 

injury of head, initial encounter





Condition: Fair


Referrals: 


Patient,NotPresent [Unknown] - As per Instructions

## 2017-08-28 NOTE — CPEKG
Heart Rate: 92

RR Interval: 652

QRSD Interval: 170

QT Interval: 432

QTC Interval: 535

QRS Axis: 114

T Wave Axis: -34

EKG Severity - ABNORMAL ECG -

EKG Impression: ATRIAL FIBRILLATION, V-RATE 

EKG Impression: VENTRICULAR PREMATURE COMPLEX

EKG Impression: RIGHT BUNDLE BRANCH BLOCK

EKG Impression: BORDERLINE ST DEPRESSION, LATERAL LEADS

Electronically Signed By: Uziel Romero 29-Aug-2017 14:54:16

## 2017-08-28 NOTE — GHP
[f 
rep st]



                                                            HISTORY AND PHYSICAL





DATE OF ADMISSION:  08/28/2017



PRIMARY CARDIOLOGIST:  Dr. Calos Agarwal.



PRIMARY HOSPICE SERVICE:  [facility].



CHIEF COMPLAINT:  Acute near syncope.



HISTORY OF PRESENT ILLNESS:  This 76-year-old male presented with acute near 
syncope characterized as dizziness with associated ataxia, slurred speech, 
drooling out of the left side of his mouth, with onset of symptoms at 9 a.m. on 
the day of presentation and duration persistent thereafter.  He reports that he 
had otherwise been feeling well on the morning of this presentation and he had 
taken all of his home medications at 7:00 a.m.  Approximately 2 hours after 
taking these medications, he began experiencing the aforementioned symptoms and 
he laid down on his bed.  He called the [facility] contact number and he 
reports that he was unable to receive any affective guidance.  He reports that 
these symptoms severity was high and that the lightheadedness was exacerbated 
by standing.  Consequently, he laid back down on his bed after he had lost 
balance and struck the anterior aspect of his forehead.  At that time, he 
called 911.  He otherwise reports that he has been eating and drinking exactly 
adherent to his schedule and is very careful about his fluid intake.  He 
reports that he has not been experiencing any chest pain or palpitations 
recently.  He denies any hemiparesthesia or hemiparesis.  He does note that 
over the past several weeks, he has had intermittent episodes of shortness of 
breath and lightheadedness, which have led to emergency department 
presentations.  That being said, he reports that the symptoms he experienced on 
this presentation were completely different than any symptoms he has otherwise 
experienced.



REVIEW OF SYSTEMS:  Lightheadedness, ataxia, drooling, dysarthria.  All other 10
-point review of systems otherwise negative.



PAST MEDICAL HISTORY:  

1.  Atrial fibrillation.

2.  Coronary artery disease status post CABG.

3.  Severe mitral regurgitation.

4.  Nonischemic cardiomyopathy with chronic systolic congestive heart failure, 
with ejection fraction around 15%.



PAST SURGICAL HISTORY:  

1.  CABG.

2.  Intra-aortic balloon pump in February 2016.



HOME MEDICATIONS:  Per patient's medication sheet in his personal medical binder
, it reads that he is on Plavix 75 daily, digoxin 125 mcg at bedtime, 
allopurinol 300 mg daily, losartan 12.5 mg daily, metoprolol succinate 100 mg 
daily, Aldactone 12.5 mg daily, Coumad[facility] dosed per [facility].



SOCIAL HISTORY:  The patient reports that he is a former smoker, but does not 
currently smoke.  He does not use marijuana or alcohol.  He lives in a trailer 
independently.



FAMILY HISTORY:  The patient denies any recent sick family contacts.



ALLERGIES:  The patient denies any severe allergies.



PHYSICAL EXAM:  VITAL SIGNS:  Systolic blood pressure 108, heart rate 72, SpO2 
of 96% on 2 L nasal cannula, respirations are 19 per minute.  GENERAL:  Alert, 
awake and oriented x3, in no apparent distress.  The patient is calm and relaxed
-appearing, elderly.  NEUROLOGIC:  Cranial nerves 2-12 are intact and tested, 
with a potential slight left mouth droop.  Motor strength is 5/5 in all 
extremities with the exception of the left lower extremity, which is 4/5.  
Intact sensation bilaterally.  PSYCH:  Concentration is 7/7.  Naming is 3/3.  
He is cooperative and follows commands.  EYES:  Extraocular movements intact.  
Anicteric sclerae.  ENT:  Moist mucous membranes.  The patient is notably hard 
of hearing.  CARDIOVASCULAR:  A potentially left-sided carotid bruit.  
Irregularly irregular rhythm.  A 1/6 systolic murmur at the sternum, a 2/6 
systolic murmur at the apex.  Trace bilateral lower extremity edema.  
RESPIRATORY:  Clear to auscultation bilaterally.  No crackles.  No wheezes.  GI
:  The abdomen is soft, nontender, nondistended.  No masses are palpated.  
Bowel sounds are present.  GENITOURINARY:  No bladder fullness or tenderness to 
palpation.  SKIN:  A small abrasion on the anterior scalp without any 
surrounding erythema or ecchymosis.  He does have some ecchymoses on the 
bilateral ventral aspects of his arms.



DATA:  Troponin is 0.06.



IMAGING:  Head CT demonstrates no intracranial hemorrhage.  Cardiac studies:  
EKG demonstrates atrial fibrillation, right bundle branch block, subtle ST 
depression in V6, personally interpreted EKG.  Corresponds to atrial 
fibrillation rhythm on telemetry.



OUTSIDE RECORDS:  03/16/2016, clinic note by Dr. Edd Farrell, reporting the 
patient had nonischemic cardiomyopathy with intra-aortic balloon pump on 02/22/
2016.  He was considered not eligible for heart transplant.  His dry weight is 
196 pounds.



ASSESSMENT AND PLAN:  A 76-year-old male who presents with acute near syncopal 
episode resulting in mechanical fall.  The patient reports he is unable to care 
for himself in the setting of nonischemic cardiomyopathy.

1.  Near-syncope.  Acute, new problem for this provider, further workup 
indicated.  The patient's description of his symptoms with their persistence 
raises the possibility of posterior circulation CVA, and he certainly has 
vascular risk factors.  That being said, he is on Coumadin and Plavix, and 
review of his records show that he has been on triple therapy, but he does not 
currently appear to be on triple therapy.  Consequently, if CVA is discovered 
on neuroimaging, then we would recommend resumption of his triple therapy.  I 
will order a brain MRI now without contrast, MRA of the head and neck, and also 
consider echocardiogram, depending on what I am able to find in our most recent 
records once our computer system is back on line.  In the meantime, the patient 
will have a swallow eval and if he passes, will be able to resume a cardiac 
diet.  He will be continued on his present medications, including Plavix and 
Coumadin, with daily INR check as well as LDL tomorrow a.m. to guide whether to 
initiate statin.  He will also be monitored on telemetry overnight to evaluate 
for any episodes of acute rapid ventricular response.  If his presenting 
symptom is felt to not be secondary to a CVA or TIA, then secondary 
considerations would include medication effect versus tachyarrhythmia.  Will 
monitor his blood pressure closely, continuing his home medications.  I have 
discussed his case with Dr. Edd Farrell, and he reports to me that the patient'
s baseline systolic blood pressure is between 80 and 100, and the patient's 
current systolic blood pressure is 108.  Therefore, it is somewhat unusual that 
the patient's symptoms of near syncope continue, despite his systolic blood 
pressure rendering him more perfused than one would expect with a baseline 
systolic blood pressure lower than this value.

2.  Chronic systolic congestive heart failure, nonischemic cardiomyopathy, 
currently on [facility].  I will attempt to connect with Rosa Isela Mcgrath at [phone]
[phone] to notify them of the patient's presentation and coordinate care with 
their services following discharge.  He will also be continued on his home 
medications, including ARB, beta blocker, Aldactone, Coumadin.

3.  Atrial fibrillation, permanent, secondary to severe mitral regurgitation 
and low ejection fraction.  Continue on digoxin, beta blocker, Coumadin for CVA 
prevention.

4.  Diet:  Cardiac if passes swallow eval.

5.  Prophylaxis, high risk patient.  Check INR.

6.  Code:  Full, per patient.  His daughter is MDPOA.



DISPOSITION:  Anticipated date of discharge is uncertain at this time.



ANTICIPATED LENGTH OF STAY:  Anticipate discharge tomorrow if patient's 
symptoms are managed, and work-up negative. If requires further diagnostic work-
up, will be upgraded to inpatient admission status.



It should be noted that our emergency department provider, Malik Garcia, 
contacted [place] [facility] and they authorized the patient to be admitted to 
our facility and for him to receive care at our direction. 



It was a pleasure serving on the care team of this patient.



Copy requested to:

Sylvie Daniel MD 

[place] [facility] 



Calos Agarwal MD 

[place] [facility] [facility]



Job #:  [id number]

MTDD

## 2019-02-10 ENCOUNTER — HOSPITAL ENCOUNTER (INPATIENT)
Dept: HOSPITAL 80 - FED | Age: 79
LOS: 2 days | Discharge: HOME | DRG: 309 | End: 2019-02-12
Attending: INTERNAL MEDICINE | Admitting: INTERNAL MEDICINE
Payer: COMMERCIAL

## 2019-02-10 DIAGNOSIS — G47.33: ICD-10-CM

## 2019-02-10 DIAGNOSIS — I50.22: ICD-10-CM

## 2019-02-10 DIAGNOSIS — Z95.5: ICD-10-CM

## 2019-02-10 DIAGNOSIS — E78.5: ICD-10-CM

## 2019-02-10 DIAGNOSIS — I48.2: ICD-10-CM

## 2019-02-10 DIAGNOSIS — I25.5: ICD-10-CM

## 2019-02-10 DIAGNOSIS — I47.2: Primary | ICD-10-CM

## 2019-02-10 DIAGNOSIS — Z96.651: ICD-10-CM

## 2019-02-10 DIAGNOSIS — Z95.810: ICD-10-CM

## 2019-02-10 DIAGNOSIS — N40.0: ICD-10-CM

## 2019-02-10 DIAGNOSIS — M10.9: ICD-10-CM

## 2019-02-10 DIAGNOSIS — I25.10: ICD-10-CM

## 2019-02-10 DIAGNOSIS — I11.0: ICD-10-CM

## 2019-02-10 DIAGNOSIS — E86.9: ICD-10-CM

## 2019-02-10 LAB
INR PPP: (no result) (ref 0.83–1.16)
INR PPP: 2.55 (ref 0.83–1.16)
PLATELET # BLD: 234 10^3/UL (ref 150–400)
PROTHROMBIN TIME: (no result) SEC (ref 12–15)
PROTHROMBIN TIME: 27.4 SEC (ref 12–15)

## 2019-02-10 PROCEDURE — G0378 HOSPITAL OBSERVATION PER HR: HCPCS

## 2019-02-10 RX ADMIN — METOPROLOL TARTRATE SCH MG: 25 TABLET, FILM COATED ORAL at 21:30

## 2019-02-10 NOTE — CPEKG
Test Reason : OPEN

Blood Pressure : ***/*** mmHG

Vent. Rate : 145 BPM     Atrial Rate : 139 BPM

   P-R Int : 000 ms          QRS Dur : 182 ms

    QT Int : 391 ms       P-R-T Axes : 000 078 254 degrees

   QTc Int : 608 ms

 

Extreme tachycardia with wide complex, no further rhythm analysis attempted

 

Confirmed by McCollester, Laughlin ([id number]) on 2/10/2019 10:48:43 PM

 

Referred By: Adebayo Neal           Confirmed By:Laughlin McCollester

## 2019-02-10 NOTE — PDGENHP
History and Physical





- Chief Complaint


Racing heart





- History of Present Illness


Patient is a very pleasant 78-year-old male with past medical history of V-tach 

requiring AICD placement, atrial fibrillation, hypertension, hyperlipidemia, 

CAD with a stent placed in 2016, gout who presented to the ER with complaints 

of racing heart.  He relates that he was just admitted to [facility] where he presented on  with the same complaints of 

racing heart and was found to be in V-tach who was started on a meal drip and 

discharged Saturday morning.  While there his amiodarone dose was doubled from 

200 mg daily to 400 mg daily.  He started the 400 mg of amiodarone this 

morning.  Later today he noticed that his heart started racing again and he had 

a home pulse ox so he checked his pulse and it showed that he was about 150 

beats per minute.  He said that he felt slightly nauseated and dizzy while his 

heart was racing.  His symptoms persisted for about an hour and a half and then 

resolve spontaneously.  He did not have any chest pain during this episode.  A 

he currently feels well and denies any shortness of breath, nausea, vomiting, 

chest pain, cough, fevers chills or other symptoms.





History Information





- Allergies/Home Medication List


Allergies/Adverse Reactions: 








atorvastatin calcium [From Lipitor] Allergy (Severe, Verified 02/10/19 20:30)


 Other-Enter Comments


simvastatin Allergy (Unknown, Verified 02/10/19 20:30)


 Other-Enter Comments





Home Medications: 








Allopurinol [Allopurinol 300 MG (RX)] 150 mg PO DAILY 17 [Last Taken ]


Losartan Potassium [Cozaar 25 mg (*)] 12.5 mg PO DAILY 17 [Last Taken ]


Nitroglycerin [Nitrostat 0.4 mg (*)] 0.4 mg SL AD PRN 17 [Last Taken 

Unknown]


Spironolactone [Aldactone 25 MG (*)] 12.5 mg PO DAILY 17 [Last Taken ]


Warfarin Sodium [Coumadin 5MG (*)] 5 mg PO SUTUTH@1600 17 [Last Taken ]


Furosemide [Lasix 40 MG (*)] 40 mg PO DAILY@09,14 17 [Last Taken 17]


Warfarin Sodium [Coumadin 5MG (*)] 7.5 mg PO MOWEFRSA@1600 17 [Last Taken 

17]


Amiodarone HCl [Pacerone (*)] 400 mg PO DAILY 02/10/19 [Last Taken Unknown]


Aspirin [Aspirin 81mg (*)] 81 mg PO DAILY 02/10/19 [Last Taken Unknown]


Finasteride [Proscar 5 MG (*)] 5 mg PO DAILY 02/10/19 [Last Taken Unknown]


Metoprolol Tartrate [Lopressor 25 mg (*)] 12.5 mg PO BID 02/10/19 [Last Taken 

Unknown]


Potassium Chloride [Klor-Con M20] 20 meq PO DAILY 02/10/19 [Last Taken Unknown]


Rosuvastatin Calcium 5 mg PO MOWEFR 02/10/19 [Last Taken Unknown]





I have personally reviewed and updated: family history, medical history, social 

history, surgical history





- Past Medical History


atrial fibrillation (  Permanent), coronary artery disease ( with LAD stent 

2016, ischemic cardiomyopathy), CHF ( systolic with ejection 

fraction 25%)


Additional medical history: nonobstructive CAD.  Afib on systemic 

anticoagulation.  HTN.  gout.  nephrolithiasis with recent stent placed.  

history of left atrial thrombus.  Cognitive impairment





- Surgical History


Additional surgical history: R total knee replacement.  appendectomy.  Recent 

ureteral stent placement.  LAD stent 2016





- Family History


Additional family history: Pt reports every male family member has  of CAD/

heart disease





- Social History


Smoking Status: Never smoked


Additional social history: Retired lives alone, reportedly is independent in 

ADLs.





Review of Systems


Review of Systems: 





ROS: 10pt was reviewed & negative except for what was stated in HPI & below





Physical Exam


Physical Exam: 

















Temp Pulse Resp BP Pulse Ox


 


 36.7 C   77   16   104/82 H  96 


 


 02/10/19 20:03  02/10/19 20:03  02/10/19 20:03  02/10/19 20:03  02/10/19 20:03














Lab Data & Imaging Review





 02/10/19 19:24





 19 05:05














WBC  7.86 10^3/uL (3.80-9.50)   02/10/19  19:24    


 


RBC  4.91 10^6/uL (4.40-6.38)   02/10/19  19:24    


 


Hgb  16.2 g/dL (13.7-17.5)   02/10/19  19:    


 


Hct  47.7 % (40.0-51.0)   02/10/19  19:    


 


MCV  97.1 fL (81.5-99.8)   02/10/19  19:    


 


MCH  33.0 pg (27.9-34.1)   02/10/19  19:    


 


MCHC  34.0 g/dL (32.4-36.7)   02/10/19  19:    


 


RDW  13.5 % (11.5-15.2)   02/10/19  19:    


 


Plt Count  234 10^3/uL (150-400)   02/10/19  19:24    


 


MPV  8.6 fL (8.7-11.7)  L  02/10/19  19:    


 


Neut % (Auto)  67.3 % (39.3-74.2)   02/10/19  19:    


 


Lymph % (Auto)  21.6 % (15.0-45.0)   02/10/19  19:    


 


Mono % (Auto)  8.5 % (4.5-13.0)   02/10/19  19:    


 


Eos % (Auto)  1.5 % (0.6-7.6)   02/10/19  19:    


 


Baso % (Auto)  0.8 % (0.3-1.7)   02/10/19  19:    


 


Nucleat RBC Rel Count  0.0 % (0.0-0.2)   02/10/19  19:24    


 


Absolute Neuts (auto)  5.29 10^3/uL (1.70-6.50)   02/10/19  19:    


 


Absolute Lymphs (auto)  1.70 10^3/uL (1.00-3.00)   02/10/19  19:    


 


Absolute Monos (auto)  0.67 10^3/uL (0.30-0.80)   02/10/19  19:    


 


Absolute Eos (auto)  0.12 10^3/uL (0.03-0.40)   02/10/19  19:    


 


Absolute Basos (auto)  0.06 10^3/uL (0.02-0.10)   02/10/19  19:24    


 


Absolute Nucleated RBC  0.00 10^3/uL (0-0.01)   02/10/19  19:24    


 


Immature Gran %  0.3 % (0.0-1.1)   02/10/19  19:24    


 


Immature Gran #  0.02 10^3/uL (0.00-0.10)   02/10/19  19:24    


 


PT  27.4 SEC (12.0-15.0)  H  02/10/19  19:55    


 


INR  2.55  (0.83-1.16)  H  02/10/19  19:55    


 


APTT  55.6 SEC (23.0-38.0)  H  02/10/19  19:55    


 


Sodium  140 mEq/L (135-145)   02/10/19  19:24    


 


Potassium  4.1 mEq/L (3.5-5.2)   02/10/19  19:24    


 


Chloride  104 mEq/L ()   02/10/19  19:24    


 


Carbon Dioxide  25 mEq/l (22-31)   02/10/19  19:24    


 


Anion Gap  11 mEq/L (6-14)   02/10/19  19:24    


 


BUN  26 mg/dL (7-23)  H  02/10/19  19:24    


 


Creatinine  1.5 mg/dL (0.7-1.3)  H  02/10/19  19:24    


 


Estimated GFR  45   02/10/19  19:24    


 


Glucose  122 mg/dL ()  H  02/10/19  19:24    


 


Calcium  9.9 mg/dL (8.5-10.4)   02/10/19  19:24    


 


POC Troponin I  0.05 ng/mL (0.00-0.08)   02/10/19  19:23    


 


NT-Pro-B Natriuret Pep  728 pg/mL (0-450)  H  02/10/19  19:24    











Assessment & Plan


Assessment: 


78-year-old male with past medical history of V-tach requiring an AICD placed, 

AFib here with V-tach.





V-tach- resolved spontaneously in the emergency room.  I discussed the case 

with the emergency room physician and he placed him on an amiodarone drip, and 

called cardiology will be in to evaluate the patient.  His initial troponin was 

negative, and his BMP is within normal limits.  He is normotensive and all 

other vitals are within normal limits.


-monitor in ICU


-telemetry


-Continue amiodarone drip


-cardiology to evaluate patient


-continue Lopressor at home dose of 12.5 mg twice daily





Atrial fibrillation- chads Vasc of at least 4, he is on Coumadin with INR 

currently therapeutic at 2.5.  Also on amiodarone and Lopressor


-continue Coumadin


-continue Lopressor and amiodarone


-monitor on telemetry





CAD- with stent placed in . On the appropriate medications with a aspirin 

statin, beta-blocker, angiotensin receptor blocker.  I would continue these 

unless Cardiology feels otherwise





Gout-continue allopurinol 150 daily





BPH-on finasteride which should be fine to continue





Prophylaxis- Coumadin and SCDs


Fluids- gentle hydration


Electrolytes-within normal limits would keep potassium over 4 and magnesium 

over 2


Nutrition-NPO until seen by Cardiology


Cor-full core


Dispo- observation in the ICU for V-tach

## 2019-02-10 NOTE — EDPHY
H & P


Time Seen by Provider: 02/10/19 19:26


HPI/ROS: 





HPI


Palpitations.





78-year-old male by ambulance.  This patient has a history of recent 

ventricular tachycardia.  He does have a AICD in place.  He is a [facility] 

patient.  He tells me that on Friday evening he developed tachycardia with a 

rate of 150.  His AICD shocked him.  He was taken to [facility].  He was seen by the [place] cardiologist.  He had his amiodarone 

increased from 200 mg to 400 mg daily.  He took 400 mg of amiodarone earlier 

this morning.  He reports about 40 min prior to arrival he developed 

tachycardia and a sensation of anxiety.  He states that his rate at home was 

145. He denies being shocked by his AICD.  Initial impression from rhythm strip 

is that he is in ventricular tachycardia.





ROS:





Constitutional:  No fever, no chills.  As above.


Eyes:  No discharge.  No changes in vision.


ENT:  No sore throat.  No nasal congestion or rhinorrhea.


Respiratory:  No cough.  No shortness of breath.


Cardiac:  No chest pain, as above.


Gastrointestinal:  No abdominal pain, no vomiting, no diarrhea.


Genitourinary:  No hematuria.  No dysuria or increased frequency with urination.


Musculoskeletal:  No back pain.  No neck pain.  No myalgias or arthralgias.


Skin:  No rashes.


Neurological:  No headache.  No focal weakness or altered sensation.





Past medical history:  He has a history of coronary artery disease.  A history 

of atrial flutter and atrial fibrillation.  He is on Coumadin.  Other 

medications include amiodarone as noted above as well as metoprolol.  His 

cardiologist used to be Edd Farrell.  He tells me that [employer] fired him 

years back.  AICD was placed in September of 2017 secondary to frequent runs of 

V-tach.  He also had a syncopal episode associated with this.





Social history:  Former smoker.  Denies alcohol.  Here by himself.





Physical Exam:





General Appearance:  Alert, mildly anxious, moderately obese, no distress.  

This patient is responding to questions appropriately and in full sentences.  

This patient appears well-hydrated and well-nourished.


Eyes:  Pupils equal and round no pallor or injection.  No lid edema, erythema 

or injection.


Respiratory:  There are no retractions, lungs are clear to auscultation 

anteriorly with good air movement bilaterally.


Cardiovascular:  Regular tachycardia.  No murmur appreciated.


Gastrointestinal:  Obese habitus.  Abdomen is soft and nontender, no masses, 

bowel sounds normal.  No focal tenderness at McBurney's point.  No Packer sign.


Neurological:  Motor sensory function is grossly intact.  Cranial nerves are 

normal.  Cerebellar function normal.


Skin:  Warm and dry, no rashes.


Musculoskeletal:  Neck is supple and nontender.


Extremities are symmetrical.  All joints range without pain or impingement.


Psychiatric:  No agitation.  No depression.





Database:





EKG:





EKG time is 7:20 p.m.:  EKG shows a wide complex ventricular tachycardia, rate 

of 145. No flutter waves are observed.  Interpreted by me.





EKG 2. EKG time is 7:54 p.m.; EKG shows a narrow complex normal sinus rhythm 

with a ventricular rate of 74. Underlying right bundle branch block noted.  

Atrial premature complexes noted.  AZ Interval is borderline prolonged.  The QRS

, QT intervals are within normal limits.  There are no ST-T wave changes 

indicative of ischemic or injury pattern.  No evidence of right heart strain.  

Interpreted by me.





Imaging:





Procedures:





Emergency department course:





Initial vital signs reviewed.  Patient's blood pressure is 110/72.  Pulse 

oximetry 95% on room air.  IV placed.  He was moved from room 5 to room 2. He 

was placed on a cardiac monitor.  Pacer pads were placed.  He was started on IV 

normal saline with 500 cc to be given over the next 30 min.  He has no 

associated chest pain.  He is mentating appropriately.  He will initially be 

given 150 mg of IV amiodarone slow IV push.  This will then be repeated if no 

conversion out of ventricular tachycardia.





The patient converted after a 2nd 150 mg dose of IV amiodarone to sinus rhythm 

as noted above on EKG.  He was started on an amiodarone drip at 1 milligram/

minute.  I spoke with [facility] cardiologist Dr. Ballesteros who can.  Previous medical 

history was reviewed.  AICD is a [employer] device that was placed in September of 2017. AICD was placed secondary to frequent runs of V-tach associated with 

syncope.





8:00 p.m., discussed case with on-call cardiologist Dr. Leni Campa.  She agrees 

with emergency department management.  She will consult on this patient once 

admitted.





8:10 p.m., spoke with on-call hospitalist.  Case discussed in detail with him.  

Patient accepted for admission to the ICU under the care of Dr. Jin.  8:30 

p.m., the patient's cardiac monitor shows a blood pressure of 114/72.  Sinus 

rhythm 73 on the monitor.  He has no complaints at this time.  He has remained 

stable through his remaining emergency department course.  He is currently on 

an amiodarone drip at 1 milligram/minute.  He was admitted to the ICU in stable 

condition.





Differential Diagnosis:





The differential diagnosis on this patient includes but is not limited to 

ventricular tachycardia.  A flutter with 2-1 av block, ventricular fibrillation

, acute coronary syndrome unlikely.  This represents a partial list of 

diagnoses considered.  These considerations are based on history, physical exam

, past history, reassessment and diagnostic testing.


Smoking Status: Never smoked


Constitutional: 


 Initial Vital Signs











O2 Sat (%)  95   02/10/19 19:28








 











O2 Delivery Mode               Room Air


 


O2 (L/minute)                  4














Allergies/Adverse Reactions: 


 





atorvastatin calcium [From Lipitor] Allergy (Severe, Verified 02/10/19 20:30)


 Other-Enter Comments


simvastatin Allergy (Unknown, Verified 02/10/19 20:30)


 Other-Enter Comments








Home Medications: 














 Medication  Instructions  Recorded


 


Allopurinol [Allopurinol 300  mg PO DAILY 05/12/17





(RX)]  


 


Losartan Potassium [Cozaar 25 mg 12.5 mg PO DAILY 05/12/17





(*)]  


 


Nitroglycerin [Nitrostat 0.4 mg 0.4 mg SL AD PRN 05/12/17





(*)]  


 


Spironolactone [Aldactone 25 MG 12.5 mg PO DAILY 05/12/17





(*)]  


 


Warfarin Sodium [Coumadin 5MG (*)] 5 mg PO SUTUTH@1600 05/12/17


 


Furosemide [Lasix 40 MG (*)] 40 mg PO DAILY@09,14 05/20/17


 


Warfarin Sodium [Coumadin 5MG (*)] 7.5 mg PO MOWEFRSA@1600 05/20/17


 


Amiodarone HCl [Pacerone (*)] 400 mg PO DAILY 02/10/19


 


Aspirin [Aspirin 81mg (*)] 81 mg PO DAILY 02/10/19


 


Finasteride [Proscar 5 MG (*)] 5 mg PO DAILY 02/10/19


 


Metoprolol Tartrate [Lopressor 25 12.5 mg PO BID 02/10/19





mg (*)]  


 


Potassium Chloride [Klor-Con M20] 20 meq PO DAILY 02/10/19


 


Rosuvastatin Calcium 5 mg PO MOWEFR 02/10/19














Medical Decision Making


Critical Care Time: 





I spent a total of 67 minutes of critical care time in obtaining history, 

performing a physical exam, bedside monitoring of interventions, collecting and 

interpreting tests and discussion with consultants but not including time spent 

performing procedures.





- Data Points


Laboratory Results: 


 Laboratory Results





 02/10/19 19:24 





 02/10/19 19:24 








Medications Given: 


 





Acetaminophen (Tylenol)  650 mg PO Q4HRS PRN


   PRN Reason: Pain, Mild/Fever, Can Take PO


   Stop: 08/09/19 20:36


   Last Admin: 02/12/19 06:01 Dose:  650 mg


Allopurinol (Allopurinol)  150 mg PO DAILY SALONI


   Stop: 08/10/19 08:59


   Last Admin: 02/11/19 10:29 Dose:  150 mg


Amiodarone HCl (Amiodarone Hcl)  400 mg PO BID SALONI


   Stop: 08/10/19 20:59


   Last Admin: 02/11/19 19:53 Dose:  400 mg


Aspirin (Aspirin)  81 mg PO DAILY SALONI


   Stop: 08/10/19 08:59


   Last Admin: 02/11/19 10:28 Dose:  81 mg


Finasteride (Proscar)  5 mg PO DAILY SALONI


   Stop: 08/10/19 08:59


   Last Admin: 02/11/19 10:31 Dose:  5 mg


Furosemide (Lasix)  40 mg PO DAILY@09,14 SALONI


   Stop: 08/10/19 08:59


   Last Admin: 02/11/19 14:26 Dose:  40 mg


Losartan Potassium (Cozaar)  12.5 mg PO DAILY [facility]ONI


   Stop: 08/10/19 08:59


   Last Admin: 02/11/19 10:30 Dose:  12.5 mg


Metoprolol Tartrate (Lopressor)  12.5 mg PO BID SALONI


   Stop: 08/09/19 20:59


   Last Admin: 02/11/19 19:53 Dose:  12.5 mg


Pantoprazole Sodium (Protonix)  40 mg IVP DAILY SALONI


   Stop: 08/10/19 08:59


   Last Admin: 02/11/19 10:32 Dose:  40 mg


Rosuvastatin Calcium (Crestor)  5 mg PO MoWeFr SALONI


   Stop: 08/10/19 08:59


   Last Admin: 02/11/19 10:28 Dose:  5 mg


Spironolactone (Aldactone)  12.5 mg PO DAILY [facility]


   Stop: 08/10/19 08:59


   Last Admin: 02/11/19 10:31 Dose:  12.5 mg


Warfarin Sodium (Coumadin)  7.5 mg PO MOWEFRSA@1600 [facility]


   Stop: 08/10/19 15:59


   Last Admin: 02/11/19 16:26 Dose:  7.5 mg





Discontinued Medications





Amiodarone HCl (Amiodarone Hcl)  150 mg IVP EDNOW ONE


   Stop: 02/10/19 19:29


   Last Admin: 02/10/19 19:36 Dose:  150 mg


Amiodarone HCl (Amiodarone Hcl)  150 mg IV EDNOW ONE


   Stop: 02/10/19 19:31


   Last Admin: 02/10/19 19:37 Dose:  Not Given


Amiodarone HCl (Amiodarone Hcl)  100 mls @ 600 mls/hr IV ONCE ONE


   Stop: 02/10/19 19:28


   Last Admin: 02/10/19 19:20 Dose:  100 mls


Sodium Chloride (Ns)  500 mls @ 1,000 mls/hr IV EDNOW ONE


   PRN Reason: Protocol


   Stop: 02/10/19 19:57


   Last Admin: 02/10/19 19:36 Dose:  500 mls


Amiodarone HCl (Amiodarone Hcl)  200 mls @ 0 mls/hr IV EDNOW ONE


   PRN Reason: Per Protocol


   Stop: 02/10/19 19:59


   Last Admin: 02/10/19 20:13 Dose:  200 mls


Amiodarone HCl 540 mg/ (Dextrose)  300 mls @ 16.667 mls/hr IV ONCE ONE


   PRN Reason: Protocol


   Stop: 02/11/19 19:59


   Last Admin: 02/11/19 01:57 Dose:  300 mls


Amiodarone HCl (Amiodarone Hcl)  100 mls @ 600 mls/hr IV ONCE ONE


   Stop: 02/10/19 23:09


   Last Admin: 02/10/19 22:42 Dose:  100 mls


Magnesium Sulfate/Dextrose (Magnesium Sulf 1 Gm (Premix))  100 mls @ 100 mls/hr 

IV ONCE ONE


   Stop: 02/10/19 23:59


   Last Admin: 02/10/19 22:42 Dose:  100 mls


Oxycodone HCl (Oxycodone Ir)  5 mg PO ONCE ONE


   Stop: 02/12/19 04:51


   Last Admin: 02/12/19 05:05 Dose:  5 mg





Point of Care Test Results: 


 Chemistry











  02/10/19





  19:23


 


POC Troponin I  0.05 ng/mL ng/mL





   (0.00-0.08) 














Departure





- Departure


Disposition: [facility] Inpatient Acute


Clinical Impression: 


 Ventricular tachycardia





Condition: Serious

## 2019-02-10 NOTE — CPEKG
Test Reason : OPEN

Blood Pressure : ***/*** mmHG

Vent. Rate : 074 BPM     Atrial Rate : 076 BPM

   P-R Int : 226 ms          QRS Dur : 185 ms

    QT Int : 464 ms       P-R-T Axes : 031 232 013 degrees

   QTc Int : 515 ms

 

Sinus rhythm

Atrial premature complex

Prolonged KS interval

Right bundle branch block

 

Confirmed by McCollester, Laughlin ([id number]) on 2/10/2019 10:48:43 PM

 

Referred By: Adebayo Neal           Confirmed By:Laughlin McCollester

## 2019-02-11 LAB
INR PPP: 2.7 (ref 0.83–1.16)
PROTHROMBIN TIME: 28.6 SEC (ref 12–15)

## 2019-02-11 RX ADMIN — FINASTERIDE SCH MG: 5 TABLET, FILM COATED ORAL at 10:31

## 2019-02-11 RX ADMIN — FUROSEMIDE SCH MG: 40 TABLET ORAL at 10:27

## 2019-02-11 RX ADMIN — METOPROLOL TARTRATE SCH MG: 25 TABLET, FILM COATED ORAL at 10:31

## 2019-02-11 RX ADMIN — PANTOPRAZOLE SODIUM SCH MG: 40 INJECTION, POWDER, FOR SOLUTION INTRAVENOUS at 10:32

## 2019-02-11 RX ADMIN — AMIODARONE HYDROCHLORIDE SCH MG: 200 TABLET ORAL at 19:53

## 2019-02-11 RX ADMIN — METOPROLOL TARTRATE SCH MG: 25 TABLET, FILM COATED ORAL at 19:53

## 2019-02-11 RX ADMIN — SPIRONOLACTONE SCH MG: 25 TABLET, FILM COATED ORAL at 10:31

## 2019-02-11 RX ADMIN — ALLOPURINOL SCH MG: 300 TABLET ORAL at 10:29

## 2019-02-11 RX ADMIN — ASPIRIN 81 MG SCH MG: 81 TABLET ORAL at 10:28

## 2019-02-11 RX ADMIN — LOSARTAN POTASSIUM SCH MG: 25 TABLET, FILM COATED ORAL at 10:30

## 2019-02-11 RX ADMIN — FUROSEMIDE SCH MG: 40 TABLET ORAL at 14:26

## 2019-02-11 NOTE — PDMN
Medical Necessity


[place] [facility]  [id number]  [place]ias  A-2 days:  pt presented  

with tachycardia was found to be in V tach- PMHx: AICD in 2017  recently was at 

[facility] ( Friday)  after his AICD fired- was sent home  returned to [facility] with 

tachycardia-   - had amiodarone started in ED - cards consult today rec. cont. 

IV amiodarone loading,  then PO  with further monitoring nec.    status changed 

to INPT 2/11  for ongoing med nec for above monitoring and tx. > 2 MN>

## 2019-02-11 NOTE — PDCONSULT
Consultant Note: 





ASSESSMENT


78-year-old male with CAD and residual ischemic cardiomyopathy admitted with 

sustained ventricular tachycardia status post AICD shocks.  He is followed at 

[place]





# sustained VT.  Device interrogation revealed 11 rounds of anti tachycardia 

pacing.  Loaded with amiodarone.  Now in normal sinus rhythm.


# CAD.  Status post PCI x2.  No recent ACS.


# hypertension


# BPH


# hyperlipidemia


# atrial fibrillation.  Currently normal sinus rhythm





PLAN


# continue IV amiodarone


# transition to oral amnio after load is complete


# continue antihypertensive


# continue anticoagulation with warfarin


# continue finasteride


# continue rosuvastatin.


# maintain net even to slightly negative fluid balance.


# Feeding - NPO


# Analgesia APAP, fentanyl


# Sedation propofol


# Thromboprophylaxis - warfarin


# Head of bed elevated


# Ulcer prophylaxis - not indicated


# Glucose SSI


# Skin no skin breakdown


# Delirium - delirium precautions





IMAGING


Reviewed


8/28/2017 chest x-ray with cardiomegaly, interstitial pulmonary edema.





CONSULT


I was asked by Dr. Neal to evaluate this patient for ICU care in the 

setting of sustained VT





Chief complaint


Palpitation





HPI


78-year-old male with coronary disease status post PCI, ischemic cardiomyopathy 

and atrial fibrillation who presents to the ER with palpitations and feelings 

of racing heart.  He had a similar presentation was found to be in VT at [facility].  He was discharged within 24 hr.  Prior to recent 

hospitalization he was on amnio for 200 mg daily and was subsequently increased 

to 400 mg daily.  At time of presentation patient complained of nausea and 

lightheadedness.  He was admitted through the ED to the ICU.  He received IV 

amnio load +bolus.  On pacer interrogation he had 10+ episodes of 

antiarrhythmic overdrive pacing.  Patient states his symptoms have somewhat 

improved.  He states compliance with his home medications.  He denies fevers 

chills nausea vomiting dysuria, leg swelling, new rash





Past medical history


Ischemic cardiomyopathy, atrial fibrillation, coronary disease status post PCI 

x2 last 2016, gout, hypertension, hyperlipidemia, nephrolithiasis.





Family history


Father with coronary disease





Social history


Never smoker retired.  Lives alone.





Review of systems


A comprehensive 10 point review of systems was obtained is negative except as 

per HPI





Vitals


Reviewed.  See EMR


GEN: NAD, up in chair, interactive


NEURO: A&Ox3, CN 2-12 GI


HEENT: PERRL, EOMI, MMM, OP clear


NECK: supple, trachea midline


CHEST normal shape, no pes excavatum


CVS: rrr no m/r/g


PULM: CTA B, no wheezes/rales/rhonchi


ABD: soft, NT, ND, NABS


EXT: no swelling, no cyanosis, full ROM


SKIN: warm, dry, intact, no rash


PSYCH CAM negative, appropriate affect





Data


I reviewed patient's laboratory data.  


2/11/2019.  Potassium 4.0, chloride 111, bicarb 21, creatinine 1.3, troponin 

0.134 peaked.


See imaging results as above.

## 2019-02-11 NOTE — HOSPPROG
Hospitalist Progress Note


Assessment/Plan: 


78-year-old with a history of V-tach requiring AICD placement  is admitted with 

tachycardia, found to be in V-tach.  He has been in sinus rhythm since admission

, cardiology has seen and evaluated him and did pacemaker interrogation this 

morning.  Discussed with Dr. Wray and pulmonology.  Of note he was admitted to 

[facility] on Friday after his AICD fired and was found to have an episode 

of V-tach.  At that time his amiodarone was increased and he was sent home.





#  V-tach, symptomatic.  Currently on amiodarone and patient in sinus rhythm


* Pacemaker interrogation


* Medical management per Cardiology, they are still gathering data


* Overall patient is doing well this morning


* Will need additional midnight stay for ongoing need for medications IV and 

amiodarone loading





#  atrial fibrillation on warfarin with therapeutic INR, amiodarone and 

metoprolol for rate control





#  hypertension, blood pressure stable





#  coronary artery disease status post stent in 2016





#  gout currently on allopurinol











Subjective: Patient discussed the multidisciplinary rounds.  Currently in sinus 

rhythm and awaiting cardiology evaluation for further recommendations on his 

rhythm control.  He has had intermittent V-tach in the past.


Objective: 


 Vital Signs











Temp Pulse Resp BP Pulse Ox


 


 36.3 C   48 L  12   130/65 H  97 


 


 02/11/19 10:00  02/11/19 10:00  02/11/19 10:00  02/11/19 10:00  02/11/19 10:00








 Laboratory Results





 02/11/19 05:05 





 











 02/10/19 02/11/19 02/12/19





 05:59 05:59 05:59


 


Intake Total  2040 


 


Output Total  700 300


 


Balance  1340 -300








 











PT  28.6 SEC (12.0-15.0)  H  02/11/19  05:05    


 


INR  2.70  (0.83-1.16)  H  02/11/19  05:05    














- Physical Exam


Constitutional: no apparent distress, obese


Eyes: PERRL


Ears, Nose, Mouth, Throat: moist mucous membranes


Cardiovascular: regular rate and rhythym


Respiratory: no respiratory distress, clear to auscultation


Gastrointestinal: soft, non-tender abdomen


Genitourinary: no bladder fullness


Skin: warm


Musculoskeletal: full muscle strength


Neurologic: AAOx3


Psychiatric: interacting appropriately





ICD10 Worksheet


Patient Problems: 


 Problems











Problem Status Onset


 


Near syncope Acute  


 


Confusion with nonfocal neurological examination Acute  


 


Chest pain Acute  


 


Congestive heart failure Acute  


 


Chest pain at rest Acute  


 


Cardiomyopathy Acute  


 


Acute exacerbation of CHF (congestive heart failure) Acute  


 


Constipation Acute  


 


Atrial fibrillation with rapid ventricular response Acute  


 


Nephrolithiasis Acute  


 


Ureterolithiasis Acute  


 


Acute decompensated heart failure Acute  


 


Hematuria Acute  


 


Chest pain Acute  


 


Troponin level elevated Acute  


 


Shortness of breath Acute  


 


Syncope Acute  


 


Anxiety about blushing Acute  


 


Medication reaction Acute  


 


Lightheaded Acute  


 


Renal insufficiency Acute  


 


Shortness of breath Acute  


 


Acute prerenal azotemia Acute  


 


Syncope Acute  


 


Head injury Acute  


 


Ventricular tachycardia Acute

## 2019-02-11 NOTE — ASMTCMCOM
CM Note

 

CM Note                       

Notes:

Pt is a 79 yo M presents with ventricular tachycardia. Pt is a [facility] pt. Has been followed by 

Cardiology there. Pt has daughter in the area, lives alone. Dispo needs TBD. 



CM to follow. 



Plan: TBD



 

 

Date Signed:  02/11/2019 09:46 AM

Electronically Signed By:RASHI Varela

## 2019-02-11 NOTE — PDCARCONS
Cardiology Consult


Reason for Consult: Ventricular tachycardia.


Chief Complaint: Palpitations.


Requesting Physician: Dr. Natacha Walker.


History of Present Illness: 


This is a 78-year-old male seen in consultation in the intensive care unit.  

His a history of known CAD with previous stenting of the LAD in November of 2017 and another vessel in the distant past.  He has a known severe ischemic 

cardiomyopathy with ejection fraction of 17%, history of what was thought to be 

permanent atrial fibrillation and previous hospitalizations for congestive 

heart failure.  In the past, he had been considered for AV node ablation and 

placement of a biventricular ICD however declined.  He states in September of 2017 he received a single-chamber [employer] defibrillator.  He recalls having 

had a single shock in the past.





He was admitted to [facility] overnight this last 

Friday.  At that time, he presented after receiving defibrillation therapy.  

Apparently he was started on a higher dose of amiodarone and discharged.  

Previously he had been on 200 mg daily.  His dose was increased to 400 mg daily.





He states that last night he developed a sensation of a rapid heart rate.  His 

pulse was in the 150s to 160s.  He had no associated chest pain or chest 

pressure.  His symptoms were similar to those that he experienced at the time 

of his previous ICD shock.





In the emergency department he was in a wide complex tachycardia with heart 

rates of 145-150 beats per minute.  There is a 12 lead ECG that demonstrates a 

left bundle tachycardia with a inferior axis.  He received 2 amiodarone boluses 

of 150 mg each in the emergency department with restoration of sinus rhythm.  

He has not had further arrhythmia overnight.





He states he generally feels well.  In talking to the cardiologists at [facility] day he was on a low-dose of amiodarone to suppress nonsustained 

ventricular tachycardia.  Was thought that his atrial fibrillation was 

permanent.  He is on systemic anticoagulation in the form of Coumadin.  

Apparently, due to bradycardia his beta-blockers have been systematically 

paired back from a maximum of 100 mg of extended release metoprolol down to the 

current dose of 12-,1/2 mg.








History Information





- Allergies/Home Medication List


Allergies/Adverse Reactions: 








atorvastatin calcium [From Lipitor] Allergy (Severe, Verified 02/10/19 20:30)


 Other-Enter Comments


simvastatin Allergy (Unknown, Verified 02/10/19 20:30)


 Other-Enter Comments





Home Medications: 








Allopurinol [Allopurinol 300 MG (RX)] 150 mg PO DAILY 05/12/17 [Last Taken 08/20 /17]


Losartan Potassium [Cozaar 25 mg (*)] 12.5 mg PO DAILY 05/12/17 [Last Taken 08/ 20/17]


Nitroglycerin [Nitrostat 0.4 mg (*)] 0.4 mg SL AD PRN 05/12/17 [Last Taken 

Unknown]


Spironolactone [Aldactone 25 MG (*)] 12.5 mg PO DAILY 05/12/17 [Last Taken 08/20 /17]


Warfarin Sodium [Coumadin 5MG (*)] 5 mg PO SUTUTH@1600 05/12/17 [Last Taken 08/ 18/17]


Furosemide [Lasix 40 MG (*)] 40 mg PO DAILY@09,14 05/20/17 [Last Taken 08/20/17]


Warfarin Sodium [Coumadin 5MG (*)] 7.5 mg PO MOWEFRSA@1600 05/20/17 [Last Taken 

08/19/17]


Amiodarone HCl [Pacerone (*)] 400 mg PO DAILY 02/10/19 [Last Taken Unknown]


Aspirin [Aspirin 81mg (*)] 81 mg PO DAILY 02/10/19 [Last Taken Unknown]


Finasteride [Proscar 5 MG (*)] 5 mg PO DAILY 02/10/19 [Last Taken Unknown]


Metoprolol Tartrate [Lopressor 25 mg (*)] 12.5 mg PO BID 02/10/19 [Last Taken 

Unknown]


Potassium Chloride [Klor-Con M20] 20 meq PO DAILY 02/10/19 [Last Taken Unknown]


Rosuvastatin Calcium 5 mg PO MOWEFR 02/10/19 [Last Taken Unknown]





I have personally reviewed and updated: family history, medical history, social 

history, surgical history


Past Medical History: 








- Past Medical History


Additional medical history: Coronary artery disease as described above, chronic 

systolic congestive heart failure, previously thought to have permanent atrial 

fibrillation, hematuria, ALEX, chronic renal insufficiency.





- Family History


Positive for: non-pertinent





- Social History


Smoking Status: Never smoked


Alcohol Use: None


Drug Use: None


Additional social history: He is retired.  He currently is followed at [facility].





Physical Exam


Physical Exam: 

















Temp Pulse Resp BP Pulse Ox


 


 36.3 C   43 L  12   130/65 H  97 


 


 02/11/19 10:00  02/11/19 12:00  02/11/19 12:00  02/11/19 10:00  02/11/19 12:00




















O2 (L/minute)                  2














Constitutional: no apparent distress, appears nourished, not in pain


Eyes: PERRL, anicteric sclera, EOMI


Ears, Nose, Mouth, Throat: moist mucous membranes, hearing normal, ears appear 

normal, no oral mucosal ulcers


Cardiovascular: regular rate and rhythym, no murmur, rub, or gallop, other (ICD 

in place in the left infraclavicular fossa.), No edema


Respiratory: no respiratory distress, no rales or rhonchi, clear to auscultation


Gastrointestinal: normoactive bowel sounds, soft, non-tender abdomen, no 

palpable masses


Genitourinary: no bladder fullness, no bladder tenderness


Skin: warm, normal color, no rashes or abrasions, no fluctuance, no induration, 

No mottled


Musculoskeletal: full muscle strength, no muscle tenderness, normal joint ROM, 

no joint effusions


Psychiatric: interacting appropriately, not anxious, not encephalopathic, 

thought process linear


Lymph, Heme, Immunologic: no cervical LAD, no supraclavicular LAD





Lab and Imaging





 02/10/19 19:24





 02/11/19 05:05














WBC  7.86 10^3/uL (3.80-9.50)   02/10/19  19:24    


 


RBC  4.91 10^6/uL (4.40-6.38)   02/10/19  19:24    


 


Hgb  16.2 g/dL (13.7-17.5)   02/10/19  19:24    


 


Hct  47.7 % (40.0-51.0)   02/10/19  19:24    


 


MCV  97.1 fL (81.5-99.8)   02/10/19  19:24    


 


MCH  33.0 pg (27.9-34.1)   02/10/19  19:24    


 


MCHC  34.0 g/dL (32.4-36.7)   02/10/19  19:24    


 


RDW  13.5 % (11.5-15.2)   02/10/19  19:24    


 


Plt Count  234 10^3/uL (150-400)   02/10/19  19:24    


 


MPV  8.6 fL (8.7-11.7)  L  02/10/19  19:24    


 


Neut % (Auto)  67.3 % (39.3-74.2)   02/10/19  19:24    


 


Lymph % (Auto)  21.6 % (15.0-45.0)   02/10/19  19:24    


 


Mono % (Auto)  8.5 % (4.5-13.0)   02/10/19  19:24    


 


Eos % (Auto)  1.5 % (0.6-7.6)   02/10/19  19:24    


 


Baso % (Auto)  0.8 % (0.3-1.7)   02/10/19  19:24    


 


Nucleat RBC Rel Count  0.0 % (0.0-0.2)   02/10/19  19:24    


 


Absolute Neuts (auto)  5.29 10^3/uL (1.70-6.50)   02/10/19  19:24    


 


Absolute Lymphs (auto)  1.70 10^3/uL (1.00-3.00)   02/10/19  19:24    


 


Absolute Monos (auto)  0.67 10^3/uL (0.30-0.80)   02/10/19  19:24    


 


Absolute Eos (auto)  0.12 10^3/uL (0.03-0.40)   02/10/19  19:24    


 


Absolute Basos (auto)  0.06 10^3/uL (0.02-0.10)   02/10/19  19:24    


 


Absolute Nucleated RBC  0.00 10^3/uL (0-0.01)   02/10/19  19:24    


 


Immature Gran %  0.3 % (0.0-1.1)   02/10/19  19:24    


 


Immature Gran #  0.02 10^3/uL (0.00-0.10)   02/10/19  19:24    


 


PT  28.6 SEC (12.0-15.0)  H  02/11/19  05:05    


 


INR  2.70  (0.83-1.16)  H  02/11/19  05:05    


 


APTT  62.2 SEC (23.0-38.0)  H  02/11/19  05:05    


 


Sodium  137 mEq/L (135-145)   02/11/19  05:05    


 


Potassium  4.0 mEq/L (3.5-5.2)   02/11/19  05:05    


 


Chloride  111 mEq/L ()  H  02/11/19  05:05    


 


Carbon Dioxide  21 mEq/l (22-31)  L  02/11/19  05:05    


 


Anion Gap  5 mEq/L (6-14)  L  02/11/19  05:05    


 


BUN  22 mg/dL (7-23)   02/11/19  05:05    


 


Creatinine  1.3 mg/dL (0.7-1.3)   02/11/19  05:05    


 


Estimated GFR  53   02/11/19  05:05    


 


Glucose  100 mg/dL ()   02/11/19  05:05    


 


Calcium  8.5 mg/dL (8.5-10.4)   02/11/19  05:05    


 


Phosphorus  3.7 mg/dL (2.5-4.5)   02/11/19  05:05    


 


Magnesium  2.5 mg/dL (1.6-2.3)  H  02/11/19  05:05    


 


Total Bilirubin  0.3 mg/dL (0.1-1.4)   02/11/19  05:05    


 


AST  16 IU/L (17-59)  L  02/11/19  05:05    


 


ALT  26 IU/L (21-72)   02/11/19  05:05    


 


Alkaline Phosphatase  73 IU/L ()   02/11/19  05:05    


 


POC Troponin I  0.05 ng/mL (0.00-0.08)   02/10/19  19:23    


 


Troponin I  0.134 ng/mL (0.000-0.034)  H  02/11/19  05:05    


 


NT-Pro-B Natriuret Pep  728 pg/mL (0-450)  H  02/10/19  19:24    


 


Total Protein  5.4 g/dL (6.3-8.2)  L  02/11/19  05:05    


 


Albumin  3.0 g/dL (3.5-5.0)  L  02/11/19  05:05    








Visualized and Interpreted Chest x-ray results: No


Visualized and Interpreted imaging results: No


Visualized and Interpreted EKG results: Yes


EKG additional interpertation: He presented with a wide complex tachycardia 

with a left bundle branch block vertical axis.  In sinus rhythm he has a right 

bundle branch block.


Telemetry: Sinus rhythm.





A/P


Assessment: 


This is a 78-year-old male with a history of coronary artery disease with 2 

previous percutaneous coronary intervention procedures.  He has a residual 

ischemic cardiomyopathy with a single-chamber ICD without was implanted in 

September of 2017. He was thought to have had permanent atrial fibrillation 

however is in sinus rhythm here during this hospitalization.  He has had 

problems with significant bradycardia on higher doses of beta blockers which 

have necessitated a gradual down titration of his beta blockers over the last 

several months.  He was admitted to [facility] on 

Friday night after receiving a single appropriate discharge from his ICD.  At 

that time, his amiodarone was increased from 200-400 mg daily without receiving 

an additional load.





He presents now in sustained ventricular tachycardia.  His device was 

interrogated.  He received 11 rounds of antitachycardia pacing prior to 

spontaneous resolution of the sustained monomorphic ventricular tachycardia.  

In discussing his case with the [facility] electrophysiologists it does 

not appear that he received an amiodarone load during his hospitalization 

recently at [facility].  Since admission here he has 

received an additional load of amiodarone.  He remains in sinus rhythm.  

Electrolytes were normal and experience only a slight troponin bump without 

dynamic ST or T changes on his ECG.  He has been significantly bradycardic with 

heart rates in the 40s.  Apparently, he is programmed with a backup rate of 40 

beats per minute.





Plan: 





1. I would like to finish his amiodarone load.


2. Following the IV amiodarone load I will plan to place him on 400 mg twice 

daily.


3. I did consider increasing his metoprolol however I think this would simply 

result in worsening of his bradycardia and right ventricular pacing.  This may 

be deleterious to his heart failure.


4. As long as he remains stable over the next 24 hr I think we can discharge 

him home tomorrow afternoon.  I would like him to follow up in an expedited 

fashion with his electrophysiologist at [facility].


5. I think he should strongly be considered for an upgrade of his current 

device to a biventricular device.  At that point, is beta-blocker therapy can 

be ramped up to more appropriate doses. 


6. If he continues to have ventricular tachycardia consideration could be given 

to an ablation procedure.


7. They may be worthwhile, in elective fashion, for him to undergo coronary 

angiography.

## 2019-02-12 VITALS — SYSTOLIC BLOOD PRESSURE: 149 MMHG | DIASTOLIC BLOOD PRESSURE: 64 MMHG

## 2019-02-12 LAB
INR PPP: 2.42 (ref 0.83–1.16)
PROTHROMBIN TIME: 26.3 SEC (ref 12–15)

## 2019-02-12 RX ADMIN — SPIRONOLACTONE SCH MG: 25 TABLET, FILM COATED ORAL at 09:05

## 2019-02-12 RX ADMIN — LOSARTAN POTASSIUM SCH MG: 25 TABLET, FILM COATED ORAL at 09:06

## 2019-02-12 RX ADMIN — FUROSEMIDE SCH MG: 40 TABLET ORAL at 09:03

## 2019-02-12 RX ADMIN — METOPROLOL TARTRATE SCH MG: 25 TABLET, FILM COATED ORAL at 09:04

## 2019-02-12 RX ADMIN — AMIODARONE HYDROCHLORIDE SCH MG: 200 TABLET ORAL at 09:03

## 2019-02-12 RX ADMIN — ALLOPURINOL SCH MG: 300 TABLET ORAL at 09:05

## 2019-02-12 RX ADMIN — ASPIRIN 81 MG SCH MG: 81 TABLET ORAL at 09:04

## 2019-02-12 RX ADMIN — FINASTERIDE SCH MG: 5 TABLET, FILM COATED ORAL at 09:07

## 2019-02-12 RX ADMIN — PANTOPRAZOLE SODIUM SCH MG: 40 INJECTION, POWDER, FOR SOLUTION INTRAVENOUS at 09:08

## 2019-02-12 NOTE — SOAPPROG
SOAP Progress Note


Assessment/Plan: 


Assessment:


1. Sustained ventricular tachycardia.  Following intravenous amiodarone load 

and conversion to 400 mg twice daily of amiodarone he has not had any further 

arrhythmias.  I did talk to both his general cardiologist and 

electrophysiologist at [facility].  They would like to see him back in 

office in an expedited fashion following discharge from the hospital.  We 

talked about further potential treatment options to include continue high-dose 

amiodarone, upgrading his current ICD to a biventricular device which would 

allow for up titration of beta-blockers or potentially performing an ablation 

procedure.


2. Coronary artery disease.  This appears to be quiescent at the present time.


3. Ischemic cardiomyopathy.  He is well compensated on his current medications.


4. Paroxysmal atrial fibrillation.  He is in sinus rhythm today.  He is on 

warfarin for thromboprophylaxis.





Plan:


1. I think he can be discharged from the hospital today.


2. I would like him to continue his current medications including amiodarone 

400 mg twice daily until follow up with his [facility] team.


3. At this point we will sign off.  


02/12/19 08:31





Subjective: 





He has done well overnight with no further arrhythmias.  He is anxious to go 

home.  He states he feels well and has not been experiencing angina or 

significant dyspnea.


Objective: 





 Vital Signs











Temp Pulse Resp BP Pulse Ox


 


 36.3 C   44 L  17   149/64 H  96 


 


 02/12/19 08:00  02/12/19 08:00  02/12/19 08:00  02/12/19 08:00  02/12/19 08:00








 Laboratory Results





 02/12/19 05:05 





 











 02/11/19 02/12/19 02/13/19





 05:59 05:59 05:59


 


Intake Total 2040 2286 


 


Output Total 700 2100 


 


Balance 1340 186 








 











PT  26.3 SEC (12.0-15.0)  H  02/12/19  05:00    


 


INR  2.42  (0.83-1.16)  H  02/12/19  05:00    














Physical Exam





- Physical Exam


General Appearance: WD/WN, alert, no apparent distress


EENT: PERRL/EOMI, normal ENT inspection, pharynx normal, TMs normal


Neck: non-tender, full range of motion, supple, normal inspection


Respiratory: chest non-tender, lungs clear, normal breath sounds


Cardiac/Chest: normal peripheral pulses, regular rate, rhythm, other (ICD in 

place and well healed.)


Peripheral Pulses: 2+: carotid (R), carotid (L), femoral (R), femoral (L), 

dorsalis-pedis (R), dorsalis-pedis (L)


Abdomen: normal bowel sounds, non-tender, soft


Male Genitalia: deferred


Rectal: deferred


Back: Normal inspection


Skin: normal color, warm/dry


Lymphatic: no adenopathy


Extremities: normal range of motion, non-tender, normal inspection, normal 

capillary refill


Neuro/Psych: no motor/sensory deficits, alert, normal mood/affect, oriented x 3





ICD10 Worksheet


Patient Problems: 


 Problems











Problem Status Onset


 


Near syncope Acute  


 


Confusion with nonfocal neurological examination Acute  


 


Chest pain Acute  


 


Congestive heart failure Acute  


 


Chest pain at rest Acute  


 


Cardiomyopathy Acute  


 


Acute exacerbation of CHF (congestive heart failure) Acute  


 


Constipation Acute  


 


Atrial fibrillation with rapid ventricular response Acute  


 


Nephrolithiasis Acute  


 


Ureterolithiasis Acute  


 


Acute decompensated heart failure Acute  


 


Hematuria Acute  


 


Chest pain Acute  


 


Troponin level elevated Acute  


 


Shortness of breath Acute  


 


Syncope Acute  


 


Anxiety about blushing Acute  


 


Medication reaction Acute  


 


Lightheaded Acute  


 


Renal insufficiency Acute  


 


Shortness of breath Acute  


 


Acute prerenal azotemia Acute  


 


Syncope Acute  


 


Head injury Acute  


 


Ventricular tachycardia Acute

## 2019-02-12 NOTE — CPEKG
Test Reason : OPEN

Blood Pressure : ***/*** mmHG

Vent. Rate : 050 BPM     Atrial Rate : 050 BPM

   P-R Int : 166 ms          QRS Dur : 183 ms

    QT Int : 582 ms       P-R-T Axes : 264 054 104 degrees

   QTc Int : 531 ms

 

Sinus or ectopic atrial rhythm

Right bundle branch block

 

Confirmed by Jimbo White ([id number]) on 2/12/2019 3:55:47 PM

 

Referred By: Adebayo Neal           Confirmed By:Jimbo White

## 2019-02-12 NOTE — HOSPPROG
Hospitalist Progress Note


Assessment/Plan: 





78-year-old with a history of V-tach requiring AICD placement  is admitted with 

tachycardia, found to be in V-tach.  He has been in sinus rhythm since admission

, cardiology has seen and evaluated him and did pacemaker interrogation this 

morning.  Discussed with Dr. Wray and pulmonology.  Of note he was admitted to 

[facility] on Friday after his AICD fired and was found to have an episode 

of V-tach.  At that time his amiodarone was increased and he was sent home.





V-tach, symptomatic.  Currently on amiodarone and patient in sinus rhythm


   s/p amio load


   increase dose to 400 bid


   outpt cardiology follow up


   


   limited trop bump - not ACS driven





atrial fibrillation on warfarin with therapeutic INR, amiodarone and metoprolol 

for rate control





hypertension, blood pressure stable





coronary artery disease status post stent in 2016





gout currently on allopurinol





dispo: home today


   > 30 minutes





Subjective: cleared by cardiology for dc.  anious to go home.  no events 

overnight on telemetry (interp by me)


Objective: 


 Vital Signs











Temp Pulse Resp BP Pulse Ox


 


 36.3 C   44 L  17   149/64 H  96 


 


 02/12/19 08:00  02/12/19 09:04  02/12/19 08:00  02/12/19 09:06  02/12/19 08:00








 Laboratory Results





 02/12/19 05:05 





 











 02/11/19 02/12/19 02/13/19





 05:59 05:59 05:59


 


Intake Total 2040 2286 


 


Output Total 700 2100 


 


Balance 1340 186 








 











PT  26.3 SEC (12.0-15.0)  H  02/12/19  05:00    


 


INR  2.42  (0.83-1.16)  H  02/12/19  05:00    














- Physical Exam


Constitutional: no apparent distress, appears nourished


Eyes: PERRL, anicteric sclera


Ears, Nose, Mouth, Throat: moist mucous membranes, hearing normal


Cardiovascular: regular rate and rhythym, no murmur, rub, or gallop, No edema


Respiratory: no respiratory distress, no rales or rhonchi


Gastrointestinal: normoactive bowel sounds, soft, non-tender abdomen


Genitourinary: no bladder fullness, No justin in urethra


Skin: warm, normal color


Musculoskeletal: full muscle strength


Neurologic: AAOx3





ICD10 Worksheet


Patient Problems: 


 Problems











Problem Status Onset


 


Ventricular tachycardia Acute  


 


Acute decompensated heart failure Acute  


 


Acute exacerbation of CHF (congestive heart failure) Acute  


 


Acute prerenal azotemia Acute  


 


Anxiety about blushing Acute  


 


Atrial fibrillation with rapid ventricular response Acute  


 


Cardiomyopathy Acute  


 


Chest pain Acute  


 


Chest pain Acute  


 


Chest pain at rest Acute  


 


Confusion with nonfocal neurological examination Acute  


 


Congestive heart failure Acute  


 


Constipation Acute  


 


Head injury Acute  


 


Hematuria Acute  


 


Lightheaded Acute  


 


Medication reaction Acute  


 


Near syncope Acute  


 


Nephrolithiasis Acute  


 


Renal insufficiency Acute  


 


Shortness of breath Acute  


 


Shortness of breath Acute  


 


Syncope Acute  


 


Syncope Acute  


 


Troponin level elevated Acute  


 


Ureterolithiasis Acute

## 2019-02-12 NOTE — GDS
[f rep st]



                                                             DISCHARGE SUMMARY





DISCHARGE DIAGNOSES:  

1.  Ventricular tachycardia.

2.  History of coronary disease.

3.  Ischemic cardiomyopathy.

4.  Atrial fibrillation.



HOSPITAL COURSE:  Please see admission history and physical by Dr. Adebayo Barba.  The patient presen
millicent to the hospital with a feeling of a racing heart.  On presentation, his initial EKG was sinus rhy
thm with PACs and somewhat of a wide-complex, but then subsequent EKG showed likely ventricular tachy
cardia. 



His single-chamber ICD was interrogated and it found that he had initiated antitachycardia pacing.  H
e converted spontaneously.  He was seen by Cardiology and he was amiodarone loaded, and with no furth
er events on telemetry.  He was not bradycardic. 



His electrolytes were normal.  His troponins were minimally elevated consistent with electrical stimu
lation of the heart, and his EKG did not have ischemic changes when not in ventricular tachycardia.  
He was not in clinical heart failure or dehydrated when he was here.  He is discharged home to follow
 up with his outpatient cardiologist at [place].



DISCHARGE MEDICATIONS:  Medication changes:  Increasing his amiodarone to 400 b.i.d.  He is _________
_ provided with prescriptions.





Job #:  [id number]

## 2019-06-03 ENCOUNTER — HOSPITAL ENCOUNTER (EMERGENCY)
Dept: HOSPITAL 80 - FED | Age: 79
LOS: 1 days | Discharge: HOME | End: 2019-06-04
Payer: COMMERCIAL

## 2019-06-07 ENCOUNTER — HOSPITAL ENCOUNTER (EMERGENCY)
Dept: HOSPITAL 80 - FED | Age: 79
Discharge: ANOTHER HOSPITAL | End: 2019-06-07
Payer: COMMERCIAL

## 2019-06-16 ENCOUNTER — HOSPITAL ENCOUNTER (EMERGENCY)
Dept: HOSPITAL 80 - FED | Age: 79
Discharge: HOME | End: 2019-06-16
Payer: COMMERCIAL

## 2019-06-22 ENCOUNTER — HOSPITAL ENCOUNTER (OUTPATIENT)
Dept: HOSPITAL 80 - FED | Age: 79
Setting detail: OBSERVATION
Discharge: HOME | End: 2019-06-22
Payer: COMMERCIAL